# Patient Record
Sex: MALE | Race: WHITE | Employment: OTHER | URBAN - NONMETROPOLITAN AREA
[De-identification: names, ages, dates, MRNs, and addresses within clinical notes are randomized per-mention and may not be internally consistent; named-entity substitution may affect disease eponyms.]

---

## 2017-01-06 ENCOUNTER — CARE COORDINATION (OUTPATIENT)
Dept: CARE COORDINATION | Age: 72
End: 2017-01-06

## 2017-01-27 PROBLEM — I20.0 UNSTABLE ANGINA (HCC): Status: ACTIVE | Noted: 2017-01-27

## 2017-01-31 ENCOUNTER — OFFICE VISIT (OUTPATIENT)
Dept: PULMONOLOGY | Age: 72
End: 2017-01-31

## 2017-01-31 ENCOUNTER — CARE COORDINATION (OUTPATIENT)
Dept: CARE COORDINATION | Age: 72
End: 2017-01-31

## 2017-01-31 VITALS
WEIGHT: 266 LBS | DIASTOLIC BLOOD PRESSURE: 76 MMHG | SYSTOLIC BLOOD PRESSURE: 140 MMHG | BODY MASS INDEX: 36.03 KG/M2 | OXYGEN SATURATION: 98 % | RESPIRATION RATE: 18 BRPM | HEART RATE: 77 BPM | TEMPERATURE: 98.5 F | HEIGHT: 72 IN

## 2017-01-31 DIAGNOSIS — G47.33 OSA ON CPAP: ICD-10-CM

## 2017-01-31 DIAGNOSIS — J43.2 CENTRILOBULAR EMPHYSEMA (HCC): Primary | ICD-10-CM

## 2017-01-31 DIAGNOSIS — Z99.89 OSA ON CPAP: ICD-10-CM

## 2017-01-31 PROCEDURE — 99213 OFFICE O/P EST LOW 20 MIN: CPT | Performed by: PHYSICIAN ASSISTANT

## 2017-01-31 PROCEDURE — 1123F ACP DISCUSS/DSCN MKR DOCD: CPT | Performed by: PHYSICIAN ASSISTANT

## 2017-01-31 PROCEDURE — G8427 DOCREV CUR MEDS BY ELIG CLIN: HCPCS | Performed by: PHYSICIAN ASSISTANT

## 2017-01-31 PROCEDURE — 1111F DSCHRG MED/CURRENT MED MERGE: CPT | Performed by: PHYSICIAN ASSISTANT

## 2017-01-31 PROCEDURE — G8417 CALC BMI ABV UP PARAM F/U: HCPCS | Performed by: PHYSICIAN ASSISTANT

## 2017-01-31 PROCEDURE — 3023F SPIROM DOC REV: CPT | Performed by: PHYSICIAN ASSISTANT

## 2017-01-31 PROCEDURE — G8926 SPIRO NO PERF OR DOC: HCPCS | Performed by: PHYSICIAN ASSISTANT

## 2017-01-31 PROCEDURE — 4040F PNEUMOC VAC/ADMIN/RCVD: CPT | Performed by: PHYSICIAN ASSISTANT

## 2017-01-31 PROCEDURE — 1036F TOBACCO NON-USER: CPT | Performed by: PHYSICIAN ASSISTANT

## 2017-01-31 PROCEDURE — G8598 ASA/ANTIPLAT THER USED: HCPCS | Performed by: PHYSICIAN ASSISTANT

## 2017-01-31 PROCEDURE — 3017F COLORECTAL CA SCREEN DOC REV: CPT | Performed by: PHYSICIAN ASSISTANT

## 2017-01-31 PROCEDURE — G8484 FLU IMMUNIZE NO ADMIN: HCPCS | Performed by: PHYSICIAN ASSISTANT

## 2017-01-31 ASSESSMENT — ENCOUNTER SYMPTOMS
SPUTUM PRODUCTION: 0
SORE THROAT: 0
COUGH: 0
VOMITING: 0
NAUSEA: 0
GASTROINTESTINAL NEGATIVE: 1
HEMOPTYSIS: 0
SHORTNESS OF BREATH: 0
RESPIRATORY NEGATIVE: 1
BACK PAIN: 0
HEARTBURN: 0
EYES NEGATIVE: 1
WHEEZING: 0

## 2017-02-02 ENCOUNTER — OFFICE VISIT (OUTPATIENT)
Dept: FAMILY MEDICINE CLINIC | Age: 72
End: 2017-02-02

## 2017-02-02 VITALS
TEMPERATURE: 97.5 F | BODY MASS INDEX: 38.25 KG/M2 | HEART RATE: 75 BPM | WEIGHT: 267.2 LBS | DIASTOLIC BLOOD PRESSURE: 74 MMHG | SYSTOLIC BLOOD PRESSURE: 136 MMHG | OXYGEN SATURATION: 97 % | HEIGHT: 70 IN

## 2017-02-02 DIAGNOSIS — R07.2 PRECORDIAL PAIN: Primary | ICD-10-CM

## 2017-02-02 DIAGNOSIS — I25.110 CORONARY ARTERY DISEASE INVOLVING NATIVE CORONARY ARTERY OF NATIVE HEART WITH UNSTABLE ANGINA PECTORIS (HCC): ICD-10-CM

## 2017-02-02 DIAGNOSIS — K52.839 MICROSCOPIC COLITIS, UNSPECIFIED: ICD-10-CM

## 2017-02-02 PROCEDURE — 99495 TRANSJ CARE MGMT MOD F2F 14D: CPT | Performed by: FAMILY MEDICINE

## 2017-02-13 ENCOUNTER — OFFICE VISIT (OUTPATIENT)
Dept: INTERNAL MEDICINE | Age: 72
End: 2017-02-13

## 2017-02-13 VITALS
HEIGHT: 72 IN | SYSTOLIC BLOOD PRESSURE: 120 MMHG | DIASTOLIC BLOOD PRESSURE: 80 MMHG | BODY MASS INDEX: 37.22 KG/M2 | HEART RATE: 78 BPM | WEIGHT: 274.8 LBS

## 2017-02-13 DIAGNOSIS — E11.9 TYPE 2 DIABETES MELLITUS WITHOUT COMPLICATION, WITH LONG-TERM CURRENT USE OF INSULIN (HCC): ICD-10-CM

## 2017-02-13 DIAGNOSIS — E66.09 NON MORBID OBESITY DUE TO EXCESS CALORIES: ICD-10-CM

## 2017-02-13 DIAGNOSIS — K21.9 GASTROESOPHAGEAL REFLUX DISEASE, ESOPHAGITIS PRESENCE NOT SPECIFIED: ICD-10-CM

## 2017-02-13 DIAGNOSIS — Z99.89 OSA ON CPAP: ICD-10-CM

## 2017-02-13 DIAGNOSIS — Z79.4 TYPE 2 DIABETES MELLITUS WITHOUT COMPLICATION, WITH LONG-TERM CURRENT USE OF INSULIN (HCC): ICD-10-CM

## 2017-02-13 DIAGNOSIS — I20.8 ANGINA OF EFFORT (HCC): Primary | ICD-10-CM

## 2017-02-13 DIAGNOSIS — E78.5 HYPERLIPIDEMIA, UNSPECIFIED HYPERLIPIDEMIA TYPE: ICD-10-CM

## 2017-02-13 DIAGNOSIS — G47.33 OSA ON CPAP: ICD-10-CM

## 2017-02-13 DIAGNOSIS — I25.10 ASCVD (ARTERIOSCLEROTIC CARDIOVASCULAR DISEASE): ICD-10-CM

## 2017-02-13 PROCEDURE — 3017F COLORECTAL CA SCREEN DOC REV: CPT | Performed by: INTERNAL MEDICINE

## 2017-02-13 PROCEDURE — G8417 CALC BMI ABV UP PARAM F/U: HCPCS | Performed by: INTERNAL MEDICINE

## 2017-02-13 PROCEDURE — G8598 ASA/ANTIPLAT THER USED: HCPCS | Performed by: INTERNAL MEDICINE

## 2017-02-13 PROCEDURE — 1036F TOBACCO NON-USER: CPT | Performed by: INTERNAL MEDICINE

## 2017-02-13 PROCEDURE — 3045F PR MOST RECENT HEMOGLOBIN A1C LEVEL 7.0-9.0%: CPT | Performed by: INTERNAL MEDICINE

## 2017-02-13 PROCEDURE — 99214 OFFICE O/P EST MOD 30 MIN: CPT | Performed by: INTERNAL MEDICINE

## 2017-02-13 PROCEDURE — G8484 FLU IMMUNIZE NO ADMIN: HCPCS | Performed by: INTERNAL MEDICINE

## 2017-02-13 PROCEDURE — 4040F PNEUMOC VAC/ADMIN/RCVD: CPT | Performed by: INTERNAL MEDICINE

## 2017-02-13 PROCEDURE — G8427 DOCREV CUR MEDS BY ELIG CLIN: HCPCS | Performed by: INTERNAL MEDICINE

## 2017-02-13 PROCEDURE — 1111F DSCHRG MED/CURRENT MED MERGE: CPT | Performed by: INTERNAL MEDICINE

## 2017-02-13 PROCEDURE — 1123F ACP DISCUSS/DSCN MKR DOCD: CPT | Performed by: INTERNAL MEDICINE

## 2017-02-14 RX ORDER — ATORVASTATIN CALCIUM 80 MG/1
TABLET, FILM COATED ORAL
Qty: 90 TABLET | Refills: 2 | Status: SHIPPED | OUTPATIENT
Start: 2017-02-14 | End: 2017-12-12 | Stop reason: SDUPTHER

## 2017-02-14 RX ORDER — ISOSORBIDE MONONITRATE 120 MG/1
TABLET, EXTENDED RELEASE ORAL
Qty: 90 TABLET | Refills: 2 | Status: ON HOLD | OUTPATIENT
Start: 2017-02-14 | End: 2018-05-23 | Stop reason: ALTCHOICE

## 2017-02-15 ENCOUNTER — CARE COORDINATION (OUTPATIENT)
Dept: CARE COORDINATION | Age: 72
End: 2017-02-15

## 2017-02-20 ENCOUNTER — CARE COORDINATION (OUTPATIENT)
Dept: CARE COORDINATION | Age: 72
End: 2017-02-20

## 2017-03-01 ENCOUNTER — CARE COORDINATION (OUTPATIENT)
Dept: CARE COORDINATION | Age: 72
End: 2017-03-01

## 2017-03-09 ENCOUNTER — CARE COORDINATION (OUTPATIENT)
Dept: CARE COORDINATION | Age: 72
End: 2017-03-09

## 2017-03-17 ENCOUNTER — TELEPHONE (OUTPATIENT)
Dept: PHARMACY | Facility: CLINIC | Age: 72
End: 2017-03-17

## 2017-03-27 ENCOUNTER — CARE COORDINATION (OUTPATIENT)
Dept: CARE COORDINATION | Age: 72
End: 2017-03-27

## 2017-03-31 RX ORDER — METOPROLOL TARTRATE 75 MG/1
75 TABLET, FILM COATED ORAL 2 TIMES DAILY
Qty: 60 TABLET | Refills: 2 | Status: SHIPPED | OUTPATIENT
Start: 2017-03-31 | End: 2017-05-01 | Stop reason: SDUPTHER

## 2017-04-04 ENCOUNTER — OFFICE VISIT (OUTPATIENT)
Dept: UROLOGY | Age: 72
End: 2017-04-04

## 2017-04-04 VITALS
SYSTOLIC BLOOD PRESSURE: 102 MMHG | WEIGHT: 255 LBS | HEIGHT: 72 IN | DIASTOLIC BLOOD PRESSURE: 60 MMHG | BODY MASS INDEX: 34.54 KG/M2

## 2017-04-04 DIAGNOSIS — C61 PROSTATE CANCER (HCC): Primary | ICD-10-CM

## 2017-04-04 LAB
BILIRUBIN URINE: NEGATIVE
BLOOD URINE, POC: ABNORMAL
CHARACTER, URINE: CLEAR
COLOR, URINE: YELLOW
GLUCOSE URINE: NEGATIVE MG/DL
KETONES, URINE: NEGATIVE
LEUKOCYTE CLUMPS, URINE: NEGATIVE
NITRITE, URINE: NEGATIVE
PH, URINE: 5
PROTEIN, URINE: >= 300 MG/DL
SPECIFIC GRAVITY, URINE: 1.02 (ref 1–1.03)
UROBILINOGEN, URINE: 0.2 EU/DL

## 2017-04-04 PROCEDURE — 1123F ACP DISCUSS/DSCN MKR DOCD: CPT | Performed by: NURSE PRACTITIONER

## 2017-04-04 PROCEDURE — 99213 OFFICE O/P EST LOW 20 MIN: CPT | Performed by: NURSE PRACTITIONER

## 2017-04-04 PROCEDURE — 4040F PNEUMOC VAC/ADMIN/RCVD: CPT | Performed by: NURSE PRACTITIONER

## 2017-04-04 PROCEDURE — 81003 URINALYSIS AUTO W/O SCOPE: CPT | Performed by: NURSE PRACTITIONER

## 2017-04-04 PROCEDURE — G8417 CALC BMI ABV UP PARAM F/U: HCPCS | Performed by: NURSE PRACTITIONER

## 2017-04-04 PROCEDURE — 1036F TOBACCO NON-USER: CPT | Performed by: NURSE PRACTITIONER

## 2017-04-04 PROCEDURE — G8598 ASA/ANTIPLAT THER USED: HCPCS | Performed by: NURSE PRACTITIONER

## 2017-04-04 PROCEDURE — G8427 DOCREV CUR MEDS BY ELIG CLIN: HCPCS | Performed by: NURSE PRACTITIONER

## 2017-04-04 PROCEDURE — 3017F COLORECTAL CA SCREEN DOC REV: CPT | Performed by: NURSE PRACTITIONER

## 2017-04-10 ENCOUNTER — CARE COORDINATION (OUTPATIENT)
Dept: CARE COORDINATION | Age: 72
End: 2017-04-10

## 2017-04-17 RX ORDER — TIOTROPIUM BROMIDE 18 UG/1
CAPSULE ORAL; RESPIRATORY (INHALATION)
Qty: 90 CAPSULE | Refills: 3 | Status: ON HOLD | OUTPATIENT
Start: 2017-04-17 | End: 2018-01-24 | Stop reason: ALTCHOICE

## 2017-05-01 RX ORDER — METOPROLOL TARTRATE 75 MG/1
75 TABLET, FILM COATED ORAL 2 TIMES DAILY
Qty: 60 TABLET | Refills: 2 | Status: SHIPPED | OUTPATIENT
Start: 2017-05-01 | End: 2017-07-13 | Stop reason: SDUPTHER

## 2017-05-08 ENCOUNTER — TELEPHONE (OUTPATIENT)
Dept: FAMILY MEDICINE CLINIC | Age: 72
End: 2017-05-08

## 2017-05-10 ENCOUNTER — CARE COORDINATION (OUTPATIENT)
Dept: CARE COORDINATION | Age: 72
End: 2017-05-10

## 2017-05-24 ENCOUNTER — OFFICE VISIT (OUTPATIENT)
Dept: FAMILY MEDICINE CLINIC | Age: 72
End: 2017-05-24

## 2017-05-24 VITALS
HEART RATE: 66 BPM | WEIGHT: 262 LBS | DIASTOLIC BLOOD PRESSURE: 68 MMHG | RESPIRATION RATE: 12 BRPM | TEMPERATURE: 98.1 F | BODY MASS INDEX: 35.49 KG/M2 | HEIGHT: 72 IN | SYSTOLIC BLOOD PRESSURE: 124 MMHG

## 2017-05-24 DIAGNOSIS — E11.65 UNCONTROLLED TYPE 2 DIABETES MELLITUS WITH STAGE 3 CHRONIC KIDNEY DISEASE, UNSPECIFIED LONG TERM INSULIN USE STATUS: Primary | ICD-10-CM

## 2017-05-24 DIAGNOSIS — E11.22 UNCONTROLLED TYPE 2 DIABETES MELLITUS WITH STAGE 3 CHRONIC KIDNEY DISEASE, UNSPECIFIED LONG TERM INSULIN USE STATUS: Primary | ICD-10-CM

## 2017-05-24 DIAGNOSIS — I25.810 CORONARY ARTERY DISEASE INVOLVING AUTOLOGOUS VEIN CORONARY BYPASS GRAFT WITHOUT ANGINA PECTORIS: ICD-10-CM

## 2017-05-24 DIAGNOSIS — N18.3 UNCONTROLLED TYPE 2 DIABETES MELLITUS WITH STAGE 3 CHRONIC KIDNEY DISEASE, UNSPECIFIED LONG TERM INSULIN USE STATUS: Primary | ICD-10-CM

## 2017-05-24 DIAGNOSIS — I10 ESSENTIAL HYPERTENSION: ICD-10-CM

## 2017-05-24 PROCEDURE — G8417 CALC BMI ABV UP PARAM F/U: HCPCS | Performed by: FAMILY MEDICINE

## 2017-05-24 PROCEDURE — G8598 ASA/ANTIPLAT THER USED: HCPCS | Performed by: FAMILY MEDICINE

## 2017-05-24 PROCEDURE — 99214 OFFICE O/P EST MOD 30 MIN: CPT | Performed by: FAMILY MEDICINE

## 2017-05-24 PROCEDURE — G8427 DOCREV CUR MEDS BY ELIG CLIN: HCPCS | Performed by: FAMILY MEDICINE

## 2017-05-24 PROCEDURE — 1123F ACP DISCUSS/DSCN MKR DOCD: CPT | Performed by: FAMILY MEDICINE

## 2017-05-24 PROCEDURE — 3045F PR MOST RECENT HEMOGLOBIN A1C LEVEL 7.0-9.0%: CPT | Performed by: FAMILY MEDICINE

## 2017-05-24 PROCEDURE — 1036F TOBACCO NON-USER: CPT | Performed by: FAMILY MEDICINE

## 2017-05-24 PROCEDURE — 3017F COLORECTAL CA SCREEN DOC REV: CPT | Performed by: FAMILY MEDICINE

## 2017-05-24 PROCEDURE — 4040F PNEUMOC VAC/ADMIN/RCVD: CPT | Performed by: FAMILY MEDICINE

## 2017-05-24 RX ORDER — BUMETANIDE 2 MG/1
1 TABLET ORAL 2 TIMES DAILY
Status: ON HOLD | COMMUNITY
Start: 2017-05-01 | End: 2020-01-01 | Stop reason: HOSPADM

## 2017-05-24 RX ORDER — AMLODIPINE BESYLATE 10 MG/1
1 TABLET ORAL DAILY
COMMUNITY
Start: 2017-03-02 | End: 2018-01-10

## 2017-05-24 ASSESSMENT — PATIENT HEALTH QUESTIONNAIRE - PHQ9
1. LITTLE INTEREST OR PLEASURE IN DOING THINGS: 0
SUM OF ALL RESPONSES TO PHQ QUESTIONS 1-9: 0
2. FEELING DOWN, DEPRESSED OR HOPELESS: 0
SUM OF ALL RESPONSES TO PHQ9 QUESTIONS 1 & 2: 0

## 2017-06-15 ENCOUNTER — CARE COORDINATION (OUTPATIENT)
Dept: CARE COORDINATION | Age: 72
End: 2017-06-15

## 2017-06-15 ASSESSMENT — ENCOUNTER SYMPTOMS: DYSPNEA ASSOCIATED WITH: EXERTION

## 2017-06-20 RX ORDER — INSULIN GLARGINE 100 [IU]/ML
INJECTION, SOLUTION SUBCUTANEOUS
Qty: 1 VIAL | Refills: 3 | Status: SHIPPED | OUTPATIENT
Start: 2017-06-20 | End: 2017-06-20 | Stop reason: SDUPTHER

## 2017-06-20 RX ORDER — INSULIN GLARGINE 100 [IU]/ML
INJECTION, SOLUTION SUBCUTANEOUS
Qty: 1 VIAL | Refills: 3 | Status: SHIPPED | OUTPATIENT
Start: 2017-06-20 | End: 2017-06-27 | Stop reason: SDUPTHER

## 2017-06-28 RX ORDER — SERTRALINE HYDROCHLORIDE 100 MG/1
TABLET, FILM COATED ORAL
Qty: 90 TABLET | Refills: 0 | Status: SHIPPED | OUTPATIENT
Start: 2017-06-28 | End: 2017-09-30 | Stop reason: SDUPTHER

## 2017-07-11 ENCOUNTER — OFFICE VISIT (OUTPATIENT)
Dept: UROLOGY | Age: 72
End: 2017-07-11

## 2017-07-11 VITALS
WEIGHT: 274 LBS | HEIGHT: 72 IN | SYSTOLIC BLOOD PRESSURE: 132 MMHG | DIASTOLIC BLOOD PRESSURE: 76 MMHG | BODY MASS INDEX: 37.11 KG/M2

## 2017-07-11 DIAGNOSIS — C61 PROSTATE CANCER (HCC): Primary | ICD-10-CM

## 2017-07-11 LAB
BILIRUBIN URINE: NEGATIVE
BLOOD URINE, POC: ABNORMAL
CHARACTER, URINE: CLEAR
COLOR, URINE: YELLOW
GLUCOSE URINE: NEGATIVE MG/DL
KETONES, URINE: NEGATIVE
LEUKOCYTE CLUMPS, URINE: NEGATIVE
NITRITE, URINE: NEGATIVE
PH, URINE: 5
PROTEIN, URINE: 100 MG/DL
SPECIFIC GRAVITY, URINE: 1.01 (ref 1–1.03)
UROBILINOGEN, URINE: 0.2 EU/DL

## 2017-07-11 PROCEDURE — 99213 OFFICE O/P EST LOW 20 MIN: CPT | Performed by: NURSE PRACTITIONER

## 2017-07-11 PROCEDURE — 1036F TOBACCO NON-USER: CPT | Performed by: NURSE PRACTITIONER

## 2017-07-11 PROCEDURE — G8417 CALC BMI ABV UP PARAM F/U: HCPCS | Performed by: NURSE PRACTITIONER

## 2017-07-11 PROCEDURE — G8598 ASA/ANTIPLAT THER USED: HCPCS | Performed by: NURSE PRACTITIONER

## 2017-07-11 PROCEDURE — G8427 DOCREV CUR MEDS BY ELIG CLIN: HCPCS | Performed by: NURSE PRACTITIONER

## 2017-07-11 PROCEDURE — 4040F PNEUMOC VAC/ADMIN/RCVD: CPT | Performed by: NURSE PRACTITIONER

## 2017-07-11 PROCEDURE — 3017F COLORECTAL CA SCREEN DOC REV: CPT | Performed by: NURSE PRACTITIONER

## 2017-07-11 PROCEDURE — 1123F ACP DISCUSS/DSCN MKR DOCD: CPT | Performed by: NURSE PRACTITIONER

## 2017-07-11 PROCEDURE — 81003 URINALYSIS AUTO W/O SCOPE: CPT | Performed by: NURSE PRACTITIONER

## 2017-07-13 ENCOUNTER — CARE COORDINATION (OUTPATIENT)
Dept: CARE COORDINATION | Age: 72
End: 2017-07-13

## 2017-07-13 ASSESSMENT — ENCOUNTER SYMPTOMS: DYSPNEA ASSOCIATED WITH: EXERTION

## 2017-07-14 RX ORDER — METOPROLOL TARTRATE 75 MG/1
75 TABLET, FILM COATED ORAL 2 TIMES DAILY
Qty: 180 TABLET | Refills: 1 | Status: SHIPPED | OUTPATIENT
Start: 2017-07-14 | End: 2017-12-20 | Stop reason: SDUPTHER

## 2017-07-27 ENCOUNTER — CARE COORDINATION (OUTPATIENT)
Dept: CARE COORDINATION | Age: 72
End: 2017-07-27

## 2017-08-23 ENCOUNTER — CARE COORDINATION (OUTPATIENT)
Dept: CARE COORDINATION | Age: 72
End: 2017-08-23

## 2017-08-23 ASSESSMENT — ENCOUNTER SYMPTOMS: DYSPNEA ASSOCIATED WITH: EXERTION

## 2017-09-20 ENCOUNTER — NURSE ONLY (OUTPATIENT)
Dept: FAMILY MEDICINE CLINIC | Age: 72
End: 2017-09-20
Payer: MEDICARE

## 2017-09-20 DIAGNOSIS — Z23 NEED FOR PROPHYLACTIC VACCINATION AND INOCULATION AGAINST INFLUENZA: Primary | ICD-10-CM

## 2017-09-20 PROCEDURE — G0008 ADMIN INFLUENZA VIRUS VAC: HCPCS | Performed by: FAMILY MEDICINE

## 2017-09-20 PROCEDURE — 90662 IIV NO PRSV INCREASED AG IM: CPT | Performed by: FAMILY MEDICINE

## 2017-09-26 ENCOUNTER — CARE COORDINATION (OUTPATIENT)
Dept: CARE COORDINATION | Age: 72
End: 2017-09-26

## 2017-09-28 ASSESSMENT — ENCOUNTER SYMPTOMS: DYSPNEA ASSOCIATED WITH: EXERTION

## 2017-10-02 RX ORDER — SERTRALINE HYDROCHLORIDE 100 MG/1
TABLET, FILM COATED ORAL
Qty: 90 TABLET | Refills: 0 | Status: SHIPPED | OUTPATIENT
Start: 2017-10-02 | End: 2018-01-17 | Stop reason: SDUPTHER

## 2017-10-13 RX ORDER — LEVOTHYROXINE SODIUM 0.05 MG/1
TABLET ORAL
Qty: 90 TABLET | Refills: 1 | Status: SHIPPED | OUTPATIENT
Start: 2017-10-13 | End: 2018-03-28 | Stop reason: SDUPTHER

## 2017-10-13 NOTE — TELEPHONE ENCOUNTER
Last visit- 05/24/17  Next visit- 11/14/2017    Requested Prescriptions     Pending Prescriptions Disp Refills    levothyroxine (SYNTHROID) 50 MCG tablet 90 tablet 2     Sig: TAKE 1 TABLET MONDAY THROUGH SATURDAY, THEN TAKE ONE AND ONE-HALF TABLETS ON SUNDAY

## 2017-10-16 ENCOUNTER — OFFICE VISIT (OUTPATIENT)
Dept: FAMILY MEDICINE CLINIC | Age: 72
End: 2017-10-16
Payer: MEDICARE

## 2017-10-16 VITALS
DIASTOLIC BLOOD PRESSURE: 70 MMHG | WEIGHT: 268 LBS | SYSTOLIC BLOOD PRESSURE: 120 MMHG | BODY MASS INDEX: 36.3 KG/M2 | TEMPERATURE: 97.7 F | HEIGHT: 72 IN | HEART RATE: 82 BPM | OXYGEN SATURATION: 98 %

## 2017-10-16 DIAGNOSIS — J44.1 ACUTE EXACERBATION OF CHRONIC OBSTRUCTIVE AIRWAYS DISEASE (HCC): Primary | ICD-10-CM

## 2017-10-16 PROCEDURE — G8417 CALC BMI ABV UP PARAM F/U: HCPCS | Performed by: FAMILY MEDICINE

## 2017-10-16 PROCEDURE — G8427 DOCREV CUR MEDS BY ELIG CLIN: HCPCS | Performed by: FAMILY MEDICINE

## 2017-10-16 PROCEDURE — 99213 OFFICE O/P EST LOW 20 MIN: CPT | Performed by: FAMILY MEDICINE

## 2017-10-16 PROCEDURE — 1123F ACP DISCUSS/DSCN MKR DOCD: CPT | Performed by: FAMILY MEDICINE

## 2017-10-16 PROCEDURE — G8926 SPIRO NO PERF OR DOC: HCPCS | Performed by: FAMILY MEDICINE

## 2017-10-16 PROCEDURE — G8598 ASA/ANTIPLAT THER USED: HCPCS | Performed by: FAMILY MEDICINE

## 2017-10-16 PROCEDURE — 1036F TOBACCO NON-USER: CPT | Performed by: FAMILY MEDICINE

## 2017-10-16 PROCEDURE — 3023F SPIROM DOC REV: CPT | Performed by: FAMILY MEDICINE

## 2017-10-16 PROCEDURE — G8484 FLU IMMUNIZE NO ADMIN: HCPCS | Performed by: FAMILY MEDICINE

## 2017-10-16 PROCEDURE — 3017F COLORECTAL CA SCREEN DOC REV: CPT | Performed by: FAMILY MEDICINE

## 2017-10-16 PROCEDURE — 4040F PNEUMOC VAC/ADMIN/RCVD: CPT | Performed by: FAMILY MEDICINE

## 2017-10-16 RX ORDER — GUAIFENESIN 600 MG/1
600 TABLET, EXTENDED RELEASE ORAL 2 TIMES DAILY
Qty: 40 TABLET | Refills: 0 | Status: SHIPPED | OUTPATIENT
Start: 2017-10-16 | End: 2017-10-16 | Stop reason: SDUPTHER

## 2017-10-16 RX ORDER — GUAIFENESIN 600 MG/1
600 TABLET, EXTENDED RELEASE ORAL 2 TIMES DAILY
Qty: 40 TABLET | Refills: 0 | Status: ON HOLD | OUTPATIENT
Start: 2017-10-16 | End: 2018-01-24 | Stop reason: ALTCHOICE

## 2017-10-16 RX ORDER — AMOXICILLIN AND CLAVULANATE POTASSIUM 875; 125 MG/1; MG/1
1 TABLET, FILM COATED ORAL 2 TIMES DAILY
Qty: 20 TABLET | Refills: 0 | Status: SHIPPED | OUTPATIENT
Start: 2017-10-16 | End: 2018-05-29 | Stop reason: SDUPTHER

## 2017-10-16 RX ORDER — AMOXICILLIN AND CLAVULANATE POTASSIUM 875; 125 MG/1; MG/1
1 TABLET, FILM COATED ORAL 2 TIMES DAILY
Qty: 20 TABLET | Refills: 0 | Status: SHIPPED | OUTPATIENT
Start: 2017-10-16 | End: 2017-10-16 | Stop reason: SDUPTHER

## 2017-10-16 RX ORDER — FLUTICASONE PROPIONATE 50 MCG
1 SPRAY, SUSPENSION (ML) NASAL DAILY
Qty: 1 BOTTLE | Refills: 3 | Status: SHIPPED | OUTPATIENT
Start: 2017-10-16 | End: 2017-10-16 | Stop reason: SDUPTHER

## 2017-10-16 RX ORDER — FLUTICASONE PROPIONATE 50 MCG
1 SPRAY, SUSPENSION (ML) NASAL DAILY
Qty: 1 BOTTLE | Refills: 3 | Status: SHIPPED | OUTPATIENT
Start: 2017-10-16 | End: 2019-01-18 | Stop reason: SDUPTHER

## 2017-10-16 NOTE — PROGRESS NOTES
Progress Note    Patient Name:  Thien Morris   : [id number]   Age: 67 y.o. Date of Exam:  2017       Reason For Visit:   Chief Complaint   Patient presents with    Cough     4 days    Chest Congestion     4 days    Sinus Problem     4 days        Trent Bliss is a 67 y.o. male who comes today to the office because of sore throat, congestion, malaise and cough. Symptoms started 5 day(s) ago. The symptoms have gotten worse. He has not tried medications. He has not been exposed to somebody with similar symptoms. His symptoms have progressed to productive cough, shortness of breath with moderate physical activity. The sputum is thick gray.   He has COPD and has been using his albuterol, but it is not helping as much    Significant Past Medical History:    Past Medical History:   Diagnosis Date    Angina pectoris ([address]     Blood circulation, collateral     CAD (coronary artery disease)     Status post bypass, Status post stents    Cancer ([address] 5319,7441    Melanoma x2     Cancer ([address] 2016    Prostate     Carotid artery disease ([address]     moderate    Cerebral artery occlusion with cerebral infarction ([address]     CHF (congestive heart failure) ([address]     CKD (chronic kidney disease), stage III 2012    has lost 13% more of kidney function     COPD (chronic obstructive pulmonary disease) ([address]     Detached retina     Diabetes mellitus ([address]     Diabetic nephropathy/sclerosis ([address]     Diverticula of colon 2006    GERD (gastroesophageal reflux disease)     Hearing loss     does not wear aides    History of blood transfusion     during open heart ? and During service    Hyperlipidemia     Hypertension     Hypothyroidism 2012    Peripheral vascular disease ([address] 1999    Pneumonia     Prostate cancer ([address] 2016    Retinopathy due to secondary diabetes ([address]     Tobacco abuse     Type II or unspecified type diabetes mellitus without mention of complication, not stated as uncontrolled [place] blurred     rt eye    Vitreous hemorrhage of right eye ([address]     Wears glasses            Allergies:  is allergic to tape [adhesive tape]. Medications:   Current Outpatient Prescriptions   Medication Sig Dispense Refill    amoxicillin-clavulanate (AUGMENTIN) 875-125 MG per tablet Take 1 tablet by mouth 2 times daily for 10 days 20 tablet 0    fluticasone (FLONASE) 50 MCG/ACT nasal spray 1 spray by Nasal route daily 1 Bottle 3    guaiFENesin (MUCINEX) 600 MG extended release tablet Take 1 tablet by mouth 2 times daily 40 tablet 0    levothyroxine (SYNTHROID) 50 MCG tablet TAKE 1 TABLET MONDAY THROUGH SATURDAY, THEN TAKE ONE AND ONE-HALF TABLETS ON SUNDAY 90 tablet 1    sertraline (ZOLOFT) 100 MG tablet TAKE 1 TABLET DAILY 90 tablet 0    Metoprolol Tartrate 75 MG TABS Take 75 mg by mouth 2 times daily 180 tablet 1    Liraglutide (VICTOZA) 18 MG/3ML SOPN SC injection INJECT 1.8 MG INTO THE SKIN DAILY 9 Pen 2    insulin glargine (LANTUS SOLOSTAR) 100 UNIT/ML injection pen 34 units in am, 32 units in pm 5 Pen 3    insulin lispro (HUMALOG KWIKPEN) 100 UNIT/ML pen Per sliding scale TID, AC (max 80 units daily) 24 Pen 1    bumetanide (BUMEX) 2 MG tablet Take 1 tablet by mouth daily      amLODIPine (NORVASC) 10 MG tablet Take 1 tablet by mouth daily      SPIRIVA HANDIHALER 18 MCG inhalation capsule INHALE THE CONTENTS OF 1 CAPSULE DAILY 90 capsule 3    atorvastatin (LIPITOR) 80 MG tablet TAKE 1 TABLET DAILY 90 tablet 2    isosorbide mononitrate (IMDUR) 120 MG extended release tablet TAKE 1 TABLET EVERY MORNING 90 tablet 2    pantoprazole (PROTONIX) 40 MG tablet Take 1 tablet by mouth 2 times daily (before meals) 30 tablet 3    EFFIENT 10 MG TABS Take 1 tablet by mouth daily      losartan (COZAAR) 50 MG tablet Take 1 tablet by mouth daily 90 tablet 1    metroNIDAZOLE (METROGEL) 0.75 % gel Apply topically 2 times daily.  1 Tube 2    nitroGLYCERIN (NITROSTAT) 0.4 MG SL tablet Take 1 tablet for chest pain and repeat after 5 min if no relief, call 911 and take another dose 5 min later. Max of 3 doses in 15 min. 25 tablet 3    hydrALAZINE (APRESOLINE) 50 MG tablet Take 1 tablet by mouth 3 times daily as needed (Systolic blood pressure greater than 160) 90 tablet 3    CPAP Machine MISC by Does not apply route Please change CPAP pressure to 14 cm H20. 1 each 0    albuterol sulfate HFA (PROVENTIL HFA) 108 (90 BASE) MCG/ACT inhaler Inhale 2 puffs into the lungs every 6 hours as needed for Wheezing 1 Inhaler 3    glucose blood VI test strips (ACCU-CHEK TERESA PLUS) strip Check BS 4 times a day 400 each 1    aspirin 81 MG chewable tablet Take 1 tablet by mouth daily 30 tablet 3    B-D ULTRAFINE III SHORT PEN 31G X 8 MM MISC USE TO INJECT INSULIN UNDER THE SKIN FOUR TIMES A  each 1    Acetaminophen 650 MG TABS Take 650 mg by mouth every 4 hours as needed 120 tablet 3    Cholecalciferol (VITAMIN D3) 2000 UNITS CAPS   Take 1 capsule by mouth daily       glucagon 1 MG injection Inject 1 mg into the skin See Admin Instructions. Follow package directions for low blood sugar. 1 kit 3    Multiple Vitamin (MULTI-VITAMIN) TABS   Take 1 tablet by mouth daily        No current facility-administered medications for this visit. Review of systems:  Review of Systems - History obtained from the patient  General ROS: negative for - chills, fatigue or fever  ENT ROS: negative for - headaches.   Positive for nasal congestion  Respiratory ROS: positive for - cough,  shortness of breath and wheezing  Cardiovascular ROS: negative for - chest pain or edema  Gastrointestinal ROS: negative for - abdominal pain, constipation, diarrhea or nausea/vomiting  Musculoskeletal ROS: negative for - joint pain or muscle pain  Neurological ROS: negative for - dizziness  Dermatological ROS: negative for - rash    Physical Examination:  Blood pressure 120/70, pulse 82, temperature 97.7 °F (36.5 °C),

## 2017-10-16 NOTE — PROGRESS NOTES
Subjective:      Patient ID: Rosa Carlisle is a 67 y.o. male.     HPI    Review of Systems    Objective:   Physical Exam    Assessment:      ***      Plan:      ***

## 2017-10-16 NOTE — PATIENT INSTRUCTIONS
You may receive a survey about your visit with us today. The feedback from our patients helps us identify what is working well and where the service to all patients can be enhanced. Thank you! Patient Education        Saline Nasal Washes: Care Instructions  Your Care Instructions  Saline nasal washes help keep the nasal passages open by washing out thick or dried mucus. This simple remedy can help relieve symptoms of allergies, sinusitis, and colds. It also can make the nose feel more comfortable by keeping the mucous membranes moist. You may notice a little burning sensation in your nose the first few times you use the solution, but this usually gets better in a few days. Follow-up care is a key part of your treatment and safety. Be sure to make and go to all appointments, and call your doctor if you are having problems. It's also a good idea to know your test results and keep a list of the medicines you take. How can you care for yourself at home? · You can buy premixed saline solution in a squeeze bottle or other sinus rinse products at a drugstore. Read and follow the instructions on the label. · You also can make your own saline solution by adding 1 teaspoon of salt and 1 teaspoon of baking soda to 2 cups of distilled water. · If you use a homemade solution, pour a small amount into a clean bowl. Using a rubber bulb syringe, squeeze the syringe and place the tip in the salt water. Pull a small amount of the salt water into the syringe by relaxing your hand. · Sit down with your head tilted slightly back. Do not lie down. Put the tip of the bulb syringe or the squeeze bottle a little way into one of your nostrils. Gently drip or squirt a few drops into the nostril. Repeat with the other nostril. Some sneezing and gagging are normal at first.  · Gently blow your nose. · Wipe the syringe or bottle tip clean after each use. · Repeat this 2 or 3 times a day.   · Use nasal washes gently if you have nosebleeds often. When should you call for help? Watch closely for changes in your health, and be sure to contact your doctor if:  · You often get nosebleeds. · You have problems doing the nasal washes. Where can you learn more? Go to https://[email or web address].[email or web address] org and sign in to your [employer] account. Enter [phone] in the [address] box to learn more about \"Saline Nasal Washes: Care Instructions. \"     If you do not have an account, please click on the \"Sign Up Now\" link. Current as of: May 4, 2017  Content Version: 11.3  © 5063-3036 [employer]. Care instructions adapted under license by [facility] ([facility]). If you have questions about a medical condition or this instruction, always ask your healthcare professional. [address] any warranty or liability for your use of this information.

## 2017-10-20 DIAGNOSIS — E11.65 TYPE 2 DIABETES MELLITUS WITH HYPERGLYCEMIA, UNSPECIFIED LONG TERM INSULIN USE STATUS: Primary | ICD-10-CM

## 2017-10-24 ENCOUNTER — CARE COORDINATION (OUTPATIENT)
Dept: CARE COORDINATION | Age: 72
End: 2017-10-24

## 2017-10-24 ASSESSMENT — ENCOUNTER SYMPTOMS: DYSPNEA ASSOCIATED WITH: EXERTION

## 2017-10-24 NOTE — CARE COORDINATION
Instructions. Follow package directions for low blood sugar.  10/11/12  Yes Kendall Davis MD   Multiple Vitamin (MULTI-VITAMIN) TABS   Take 1 tablet by mouth daily    Yes Historical Provider, MD       Future Appointments  Date Time Provider Dean Turneri   11/21/2017 11:30 AM Omar Malagon MD [facility]   11/28/2017 10:40 AM Jonah Real MD [facility] [place] [facility]   1/10/2018 9:15 AM Waleska Salas, [address] [facility]

## 2017-11-08 ENCOUNTER — CARE COORDINATION (OUTPATIENT)
Dept: CARE COORDINATION | Age: 72
End: 2017-11-08

## 2017-11-15 ENCOUNTER — TELEPHONE (OUTPATIENT)
Dept: PHARMACY | Facility: CLINIC | Age: 72
End: 2017-11-15

## 2017-11-15 NOTE — TELEPHONE ENCOUNTER
CLINICAL PHARMACY NOTE - Medication Review  Misty Morales is a 67 y.o. male referred to a clinical pharmacy specialist given their history of COPD and/or HF and number of home medications. Patient outreach to review medications - Spoke with patient. States he would like to decline PharmD med review, has no questions/concerns at this time.     Josseline Spears, PharmD, [facility] Pharmacist  Direct: [phone]  Dept: [phone] (toll free [phone], option 7)     CLINICAL PHARMACY CONSULT: MED RECONCILIATION/REVIEW ADDENDUM    For Pharmacy Admin Tracking Only    PHSO: Yes  Time Spent (min): 5

## 2017-11-21 ENCOUNTER — OFFICE VISIT (OUTPATIENT)
Dept: FAMILY MEDICINE CLINIC | Age: 72
End: 2017-11-21
Payer: MEDICARE

## 2017-11-21 VITALS
TEMPERATURE: 98.4 F | DIASTOLIC BLOOD PRESSURE: 64 MMHG | HEART RATE: 64 BPM | WEIGHT: 267 LBS | BODY MASS INDEX: 37.38 KG/M2 | HEIGHT: 71 IN | SYSTOLIC BLOOD PRESSURE: 136 MMHG | OXYGEN SATURATION: 96 %

## 2017-11-21 DIAGNOSIS — E11.65 TYPE 2 DIABETES MELLITUS WITH HYPERGLYCEMIA, UNSPECIFIED LONG TERM INSULIN USE STATUS: ICD-10-CM

## 2017-11-21 DIAGNOSIS — Z00.00 ROUTINE GENERAL MEDICAL EXAMINATION AT A HEALTH CARE FACILITY: Primary | ICD-10-CM

## 2017-11-21 PROCEDURE — 3045F PR MOST RECENT HEMOGLOBIN A1C LEVEL 7.0-9.0%: CPT | Performed by: FAMILY MEDICINE

## 2017-11-21 PROCEDURE — G0444 DEPRESSION SCREEN ANNUAL: HCPCS | Performed by: FAMILY MEDICINE

## 2017-11-21 PROCEDURE — G0439 PPPS, SUBSEQ VISIT: HCPCS | Performed by: FAMILY MEDICINE

## 2017-11-21 PROCEDURE — G8598 ASA/ANTIPLAT THER USED: HCPCS | Performed by: FAMILY MEDICINE

## 2017-11-21 RX ORDER — FLUTICASONE PROPIONATE 50 MCG
1 SPRAY, SUSPENSION (ML) NASAL DAILY
Qty: 1 BOTTLE | Refills: 3 | Status: CANCELLED | OUTPATIENT
Start: 2017-11-21

## 2017-11-21 RX ORDER — SERTRALINE HYDROCHLORIDE 100 MG/1
TABLET, FILM COATED ORAL
Qty: 90 TABLET | Refills: 0 | Status: CANCELLED | OUTPATIENT
Start: 2017-11-21

## 2017-11-21 RX ORDER — METRONIDAZOLE 7.5 MG/G
GEL TOPICAL
Qty: 1 TUBE | Refills: 2 | Status: CANCELLED | OUTPATIENT
Start: 2017-11-21

## 2017-11-21 RX ORDER — LEVOTHYROXINE SODIUM 0.05 MG/1
TABLET ORAL
Qty: 90 TABLET | Refills: 1 | Status: CANCELLED | OUTPATIENT
Start: 2017-11-21

## 2017-11-21 ASSESSMENT — LIFESTYLE VARIABLES
HOW OFTEN DURING THE LAST YEAR HAVE YOU BEEN UNABLE TO REMEMBER WHAT HAPPENED THE NIGHT BEFORE BECAUSE YOU HAD BEEN DRINKING: 0
HAS A RELATIVE, FRIEND, DOCTOR, OR ANOTHER HEALTH PROFESSIONAL EXPRESSED CONCERN ABOUT YOUR DRINKING OR SUGGESTED YOU CUT DOWN: 0
HOW OFTEN DURING THE LAST YEAR HAVE YOU FOUND THAT YOU WERE NOT ABLE TO STOP DRINKING ONCE YOU HAD STARTED: 0
HAVE YOU OR SOMEONE ELSE BEEN INJURED AS A RESULT OF YOUR DRINKING: 0
HOW MANY STANDARD DRINKS CONTAINING ALCOHOL DO YOU HAVE ON A TYPICAL DAY: 3
HOW OFTEN DURING THE LAST YEAR HAVE YOU FAILED TO DO WHAT WAS NORMALLY EXPECTED FROM YOU BECAUSE OF DRINKING: 0
HOW OFTEN DURING THE LAST YEAR HAVE YOU NEEDED AN ALCOHOLIC DRINK FIRST THING IN THE MORNING TO GET YOURSELF GOING AFTER A NIGHT OF HEAVY DRINKING: 0
HOW OFTEN DURING THE LAST YEAR HAVE YOU HAD A FEELING OF GUILT OR REMORSE AFTER DRINKING: 1
AUDIT TOTAL SCORE: 7
AUDIT-C TOTAL SCORE: 6
HOW OFTEN DO YOU HAVE SIX OR MORE DRINKS ON ONE OCCASION: 1
HOW OFTEN DO YOU HAVE A DRINK CONTAINING ALCOHOL: 2

## 2017-11-21 ASSESSMENT — ANXIETY QUESTIONNAIRES: GAD7 TOTAL SCORE: 3

## 2017-11-21 NOTE — PATIENT INSTRUCTIONS
soft.  6. Use a nail file to gently smooth the edges of the nails, especially at the corners. They may be sharp after the nails are cut straight. 7. Apply nail polish, if the person wants it. If the person's nails are thick and discolored, it may be safest to have a podiatrist cut them. What else do you need to know? When you're caring for someone's nails, it is important to remember not to trim or cut the cuticles. A minor cut in a cuticle could lead to an infection. Wash the feet daily in the shower or bath or in a basin made for washing feet. It's extra important to wash the feet carefully if the person has diabetes. After washing the feet, dry gently. Put lotion on the feet, especially on the heels. But don't put it between the toes. If the person doesn't have diabetes and you see signs of athlete's foot (such as dry, cracking, or itchy skin between the toes), you can try an over-the-counter medicine. These medicines can kill the fungus that causes athlete's foot. If the problem doesn't go away, talk to the person's doctor. Look every day for cuts or signs of infection, such as pain, swelling, redness, or warmth. If you see any of these signsespecially in someone who has diabetescall the doctor. Where can you learn more? Go to https://[employer]ub[employer]. org and sign in to your [employer] account. Enter A759 in the [facility] box to learn more about \"Learning About Foot and Toenail Care. \"     If you do not have an account, please click on the \"Sign Up Now\" link. Current as of: March 17, 2017  Content Version: 11.3  © 5866-9614 [employer]. Care instructions adapted under license by [facility] [employer] [facility] ([facility]). If you have questions about a medical condition or this instruction, always ask your healthcare professional. [address]  any warranty or liability for your use of this information.            Personalized Preventive Plan for Fiordaliza Lalo Vazquez 11/21/2017  Medicare offers a range of preventive health benefits. Some of the tests and screenings are paid in full while other may be subject to a deductible, co-insurance, and/or copay. Some of these benefits include a comprehensive review of your medical history including lifestyle, illnesses that may run in your family, and various assessments and screenings as appropriate. After reviewing your medical record and screening and assessments performed today your provider may have ordered immunizations, labs, imaging, and/or referrals for you. A list of these orders (if applicable) as well as your Preventive Care list are included within your After Visit Summary for your review. Other Preventive Recommendations:    · A preventive eye exam performed by an eye specialist is recommended every 1-2 years to screen for glaucoma; cataracts, macular degeneration, and other eye disorders. · A preventive dental visit is recommended every 6 months. · Try to get at least 150 minutes of exercise per week or 10,000 steps per day on a pedometer . · Order or download the FREE \"Exercise & Physical Activity: Your Everyday Guide\" from [employer]. Call 1-820.473.9397 or search [employer] online. · You need 9268-1319 mg of calcium and 5225-2435 IU of vitamin D per day. It is possible to meet your calcium requirement with diet alone, but a vitamin D supplement is usually necessary to meet this goal.  · When exposed to the sun, use a sunscreen that protects against both UVA and UVB radiation with an SPF of 30 or greater. Reapply every 2 to 3 hours or after sweating, drying off with a towel, or swimming. · Always wear a seat belt when traveling in a car. Always wear a helmet when riding a bicycle or motorcycle. Heart-Healthy Diet   Sodium, Fat, and Cholesterol Controlled Diet       What Is a Heart Healthy Diet?    A heart-healthy diet is one that limits sodium , certain types of fat , and cholesterol . This type of diet is recommended for:   People with any form of cardiovascular disease (eg, coronary heart disease , peripheral vascular disease , previous heart attack , previous stroke )   People with risk factors for cardiovascular disease, such as high blood pressure , high cholesterol , or diabetes   Anyone who wants to lower their risk of developing cardiovascular disease   Sodium    Sodium is a mineral found in many foods. In general, most people consume much more sodium than they need. Diets high in sodium can increase blood pressure and lead to edema (water retention). On a heart-healthy diet, you should consume no more than 2,300 mg (milligrams) of sodium per dayabout the amount in one teaspoon of table salt. The foods highest in sodium include table salt (about 50% sodium), processed foods, convenience foods, and preserved foods. Cholesterol    Cholesterol is a fat-like, waxy substance in your blood. Our bodies make some cholesterol. It is also found in animal products, with the highest amounts in fatty meat, egg yolks, whole milk, cheese, shellfish, and organ meats. On a heart-healthy diet, you should limit your cholesterol intake to less than 200 mg per day. It is normal and important to have some cholesterol in your bloodstream. But too much cholesterol can cause plaque to build up within your arteries, which can eventually lead to a heart attack or stroke. The two types of cholesterol that are most commonly referred to are:   Low-density lipoprotein (LDL) cholesterol  Also known as bad cholesterol, this is the cholesterol that tends to build up along your arteries. Bad cholesterol levels are increased by eating fats that are saturated or hydrogenated. Optimal level of this cholesterol is less than 100. Over 130 starts to get risky for heart disease.    High-density lipoprotein (HDL) cholesterol  Also known as good cholesterol, this type of cholesterol actually carries cholesterol away from your arteries and may, therefore, help lower your risk of having a heart attack. You want this level to be high (ideally greater than 60). It is a risk to have a level less than 40. You can raise this good cholesterol by eating olive oil, canola oil, avocados, or nuts. Exercise raises this level, too. Fat    Fat is calorie dense and packs a lot of calories into a small amount of food. Even though fats should be limited due to their high calorie content, not all fats are bad. In fact, some fats are quite healthful. Fat can be broken down into four main types. The good-for-you fats are:   Monounsaturated fat  found in oils such as olive and canola, avocados, and nuts and natural nut butters; can decrease cholesterol levels, while keeping levels of HDL cholesterol high   Polyunsaturated fat  found in oils such as safflower, sunflower, soybean, corn, and sesame; can decrease total cholesterol and LDL cholesterol   Omega-3 fatty acids  particularly those found in fatty fish (such as salmon, trout, tuna, mackerel, herring, and sardines); can decrease risk of arrhythmias, decrease triglyceride levels, and slightly lower blood pressure   The fats that you want to limit are:   Saturated fat  found in animal products, many fast foods, and a few vegetables; increases total blood cholesterol, including LDL levels   Animal fats that are saturated include: butter, lard, whole-milk dairy products, meat fat, and poultry skin   Vegetable fats that are saturated include: hydrogenated shortening, palm oil, coconut oil, cocoa butter   Hydrogenated or trans fat  found in margarine and vegetable shortening, most shelf stable snack foods, and fried foods; increases LDL and decreases HDL     It is generally recommended that you limit your total fat for the day to less than 30% of your total calories. If you follow an 1800-calorie heart healthy diet, for example, this would mean 60 grams of fat or less per day. low-sodium peanut butter Dried peas, beans, and lentils   Any smoked, cured, salted, or canned meat, fish, or poultry (including fried, chipped beef, cold cuts, hot dogs, sausages, sardines, and anchovies) Poultry skins Breaded and/or fried fish or meats Canned peas, beans, and lentils Salted nuts   Fats and Oils   Olive oil and canola oil Low-sodium, low-fat salad dressings and mayonnaise   Butter, margarine, coconut and palm oils, fried fat   Snacks, Sweets, and Condiments   Low-sodium or unsalted versions of broths, soups, soy sauce, and condiments Pepper, herbs, and spices; vinegar, lemon, or lime juice Low-fat frozen desserts (yogurt, sherbet, fruit bars) Sugar, cocoa powder, honey, syrup, jam, and preserves Low-fat, trans-fat free cookies, cakes, and pies Peng and animal crackers, fig bars, shanell snaps   High-fat desserts Broth, soups, gravies, and sauces, made from instant mixes or other high-sodium ingredients Salted snack foods Canned olives Meat tenderizers, seasoning salt, and most flavored vinegars   Beverages   Low-sodium carbonated beverages Tea and coffee in moderation Soy milk   Commercially softened water   Suggestions   Make whole grains, fruits, and vegetables the base of your diet. Choose heart-healthy fats such as canola, olive, and flaxseed oil, and foods high in heart-healthy fats, such as nuts, seeds, soybeans, tofu, and fish. Eat fish at least twice per week; the fish highest in omega-3 fatty acids and lowest in mercury include salmon, herring, mackerel, sardines, and canned chunk light tuna. If you eat fish less than twice per week or have high triglycerides, talk to your doctor about taking fish oil supplements. Read food labels. For products low in fat and cholesterol, look for fat free, low-fat, cholesterol free, saturated fat free, and trans fat freeAlso scan the Nutrition Facts Label, which lists saturated fat, trans fat, and cholesterol amounts.    For products low in sodium, look for sodium free, very low sodium, low sodium, no added salt, and unsalted   Skip the salt when cooking or at the table; if food needs more flavor, get creative and try out different herbs and spices. Garlic and onion also add substantial flavor to foods. Trim any visible fat off meat and poultry before cooking, and drain the fat off after black. Use cooking methods that require little or no added fat, such as grilling, boiling, baking, poaching, broiling, roasting, steaming, stir-frying, and sauting. Avoid fast food and convenience food. They tend to be high in saturated and trans fat and have a lot of added salt. Talk to a registered dietitian for individualized diet advice. Last Reviewed: March 2011 Destiny Miranda MS, MPH, RD   Updated: 3/29/2011   ·     High-Fiber Diet     What Is Fiber? Dietary fiber is a form of carbohydrate found in plants that cannot be digested by humans. All plants contain fiber, including fruits, vegetables, grains, and legumes. Fiber is often classified into two categories: soluble and insoluble. Soluble fiber draws water into the bowel and can help slow digestion. Examples of foods that are high in soluble fiber include oatmeal, oat bran, barley, legumes (eg, beans and peas), apples, and strawberries. Insoluble fiber speeds digestion and can add bulk to the stool. Examples of foods that are high in insoluble fiber include whole-wheat products, wheat bran, cauliflower, green beans, and potatoes. Why Follow a High-Fiber Diet? A high-fiber diet is often recommended to prevent and treat constipation , hemorrhoids , diverticulitis , and irritable bowel syndrome . Eating a high-fiber diet can also help improve your cholesterol levels, lower your risk of coronary heart disease , reduce your risk of type 2 diabetes , and lower your weight. For people with type 1 or 2 diabetes, a high-fiber diet can also help stabilize blood sugar levels.    How Much Fiber beans, green pepper, onions, peas, potatoes (with skin), snow peas, spinach, squash, sweet potatoes, tomatoes, zucchini   For maximum fiber intake, eat the peels of fruits and vegetablesjust be sure to wash them well first.   Fruits   All fruits, especially apples, berries, grapefruits, mangoes, nectarines, oranges, peaches, pears, dried fruits (figs, dates, prunes, raisins)   Choose raw fruits and vegetables over juice, cooked, or cannedraw fruit has more fiber. Dried fruit is also a good source of fiber. Milk   With the exception of yogurt containing inulin (a type of fiber), dairy foods provide little fiber. Add more fiber by topping your yogurt or cottage cheese with fresh fruit, whole grain or bran cereals, nuts, or seeds. Meats and Beans   All beans and peas, especially Garbanzo beans, kidney beans, lentils, lima beans, split peas, and [place]de beans All nuts and seeds, especially almonds, peanuts, [place] nuts, cashews, peanut butter, walnuts, sesame and sunflower seeds All meat, poultry, fish, and eggs   Increase fiber in meat dishes by adding [place]de beans, kidney beans, black-eyed peas, bran, or oatmeal. If you are following a low-fat diet, use nuts and seeds only in moderation. Fats and Oils   All in moderation   Fats and oils do not provide fiber   Snacks, Sweets, and Condiments   Fruit Nuts Popcorn, whole-wheat pretzels, or trail mix made with dried fruits, nuts, and seeds Cakes, breads, and cookies made with oatmeal or whole-wheat flour   Most snack foods do not provide much fiber. Choose snacks with at least 2 grams of fiber per serving. Last Reviewed: March 2011 Cadence Del Toro MS, MPH, RD   Updated: 3/29/2011   ·     Keep Your Memory Marty Iraheta       Many factors can affect your ability to remembera hectic lifestyle, aging, stress, chronic disease, and certain medicines. But, there are steps you can take to sharpen your mind and help preserve your memory.    Challenge Your Brain   Regularly challenging supplement phosphatidylserine (PS). So far, though, the evidence is inconsistent as to whether these products can improve memory or thinking. If you are interested in taking herbs and supplements, talk to your doctor first because they may interact with other medicines that you are taking. Exercise Regularly    Among the many benefits of regular exercise are increased blood flow to the brain and decreased risk of certain diseases that can interfere with memory function. One study found that even moderate exercise has a beneficial effect. Examples of \"moderate\" exercise include:   Playing 18 holes of golf once a week, without a cart   Playing tennis twice a week   Walking one mile per day   Manage Stress    It can be tough to remember what is important when your mind is cluttered. Make time for relaxation. Choose activities that calm you down, and make it routine. Manage Chronic Conditions    Side effects of high blood pressure , diabetes, and heart disease can interfere with mental function. Many of the lifestyle steps discussed here can help manage these conditions. Strive to eat a healthy diet, exercise regularly, get stress under control, and follow your doctor's advice for your condition. Minimize Medications    Talk to your doctor about the medicines that you take. Some may be unnecessary. Also, healthy lifestyle habits may lower the need for certain drugs. Last Reviewed: April 2010 Radha Reveles MD   Updated: 4/13/2010   ·     [address] [place] [address]       As we get older, changes in balance, gait, strength, vision, hearing, and cognition make even the most youthful senior more prone to accidents. Falls are one of the leading health risks for older people.  This increased risk of falling is related to:   Aging process (eg, decreased muscle strength, slowed reflexes)   Higher incidence of chronic health problems (eg, arthritis, diabetes) that may limit mobility, agility or sensory awareness   Side effects of medicine (eg, dizziness, blurred vision)especially medicines like prescription pain medicines and drugs used to treat mental health conditions   Depending on the brittleness of your bones, the consequences of a fall can be serious and long lasting. Home Life   Research by the [employer] of [employer] [facility]) shows that some home accidents among older adults can be prevented by making simple lifestyle changes and basic modifications and repairs to the home environment. Here are some lifestyle changes that experts recommend:   Have your hearing and vision checked regularly. Be sure to wear prescription glasses that are right for you. Speak to your doctor or pharmacist about the possible side effects of your medicines. A number of medicines can cause dizziness. If you have problems with sleep, talk to your doctor. Limit your intake of alcohol. If necessary, use a cane or walker to help maintain your balance. Wear supportive, rubber-soled shoes, even at home. If you live in a region that gets wintry weather, you may want to put special cleats on your shoes to prevent you from slipping on the snow and ice. Exercise regularly to help maintain muscle tone, agility, and balance. Always hold the banister when going up or down stairs. Also, use  bars when getting in or out of the bath or shower, or using the toilet. To avoid dizziness, get up slowly from a lying down position. Sit up first, dangling your legs for a minute or two before rising to a standing position. Overall Home Safety Check   According to the Consumer Product Safety Commision's \"Older Consumer Home Safety Checklist,\" it is important to check for potential hazards in each room. And remember, proper lighting is an essential factor in home safety. If you cannot see clearly, you are more likely to fall.    Important questions to ask yourself include:   Are lamp, electric, extension, and telephone cords placed out of the flow of can wipe up spills instantly. You should also have a small fire extinguisher. Arrange your kitchen with frequently used items on lower shelves to avoid the need to stand on a stepstool to reach them. Make sure countertops are well-lit to avoid injuries while cutting and preparing food. In the Bathroom    Use a non-slip mat or decals in the tub and shower, since wet, soapy tile or porcelain surfaces are extremely slippery. Make sure bathroom rugs are non-skid or tape them firmly to the floor. Bathtubs should have at least one, preferably two, grab bars, firmly attached to structural supports in the wall. (Do not use built-in soap holders or glass shower doors as grab bars.)    Tub seats fitted with non-slip material on the legs allow you to wash sitting down. For people with limited mobility, bathtub transfer benches allow you to slide safely into the tub. Raised toilet seats and toilet safety rails are helpful for those with knee or hip problems. In the [facility]    Make sure you use a nightlight and that the area around your bed is clear of potential obstacles. Be careful with electric blankets and never go to sleep with a heating pad, which can cause serious burns even if on a low setting. Use fire-resistant mattress covers and pillows, and NEVER smoke in bed. Keep a phone next to the bed that is programmed to dial 911 at the push of a button. If you have a chronic condition, you may want to sign on with an automatic call-in service. Typically the system includes a small pendant that connects directly to an emergency medical voice-response system. You should also make arrangements to stay in contact with someonefriend, neighbor, family memberon a regular schedule. Fire Prevention   According to the [employer] (Smoke Alarms for Every) [facility], senior citizens are one of the two highest risk groups for death and serious injuries due to residential fires.    When cooking, meaningful. Follow-up care is a key part of your treatment and safety. Be sure to make and go to all appointments, and call your doctor if you are having problems. It's also a good idea to know your test results and keep a list of the medicines you take. What can you do to plan for the end of life? You can bring these issues up with your doctor. You do not need to wait until your doctor starts the conversation. You might start with \"I would not be willing to live with . Canary Malady Canary Malady Canary Malady \" When you complete this sentence it helps your doctor understand your wishes. Talk openly and honestly with your doctor. This is the best way to understand the decisions you will need to make as your health changes. Know that you can always change your mind. Ask your doctor about commonly used life-support measures. These include tube feedings, breathing machines, and fluids given through a vein (IV). Understanding these treatments will help you decide whether you want them. You may choose to have these life-supporting treatments for a limited time. This allows a trial period to see whether they will help you. You may also decide that you want your doctor to take only certain measures to keep you alive. It is important to spell out these conditions so that your doctor and family understand them. Talk to your doctor about how long you are likely to live. He or she may be able to give you an idea of what usually happens with your specific illness. Think about preparing papers that state your wishes. This way there will not be any confusion about what you want. You can change your instructions at any time. Which papers should you prepare? Advance directives are legal papers that tell doctors how you want to be cared for at the end of your life. You do not need a  to write these papers. Ask your doctor or your state health department for information on how to write your advance directives.  They may have the forms for each of these

## 2017-11-21 NOTE — PROGRESS NOTES
Medicare Annual Wellness Visit  Name: Sg Franco Date: 2017   MRN: [id number] Sex: Male   Age: 67 y.o. Ethnicity: Non-/Non    : 1945 Race: Eleonora Madden is here for Medicare AWV    Screenings for behavioral, psychosocial and functional/safety risks, and cognitive dysfunction are all negative except as indicated below. These results, as well as other patient data from the [address] form, are documented in Flowsheets linked to this Encounter. Allergies   Allergen Reactions    Tape Verline Fine Tape] Other (See Comments)     Rash and skin breakdown     Prior to Visit Medications    Medication Sig Taking?  Authorizing Provider   insulin glargine (LANTUS SOLOSTAR) 100 UNIT/ML injection pen 34 units in am, 32 units in pm Yes Ethan Mccullough MD   fluticasone (FLONASE) 50 MCG/ACT nasal spray 1 spray by Nasal route daily Yes Ethan Mccullough MD   guaiFENesin (MUCINEX) 600 MG extended release tablet Take 1 tablet by mouth 2 times daily Yes Ethan Mccullough MD   levothyroxine (SYNTHROID) 50 MCG tablet TAKE 1 TABLET MONDAY THROUGH SATURDAY, THEN TAKE ONE AND ONE-HALF TABLETS ON  Yes Ethan Mccullough MD   sertraline (ZOLOFT) 100 MG tablet TAKE 1 TABLET DAILY Yes Jenni Fitzgerald DO   Metoprolol Tartrate 75 MG TABS Take 75 mg by mouth 2 times daily Yes Nguyễn Romero CNP   Liraglutide (VICTOZA) 18 MG/3ML SOPN SC injection INJECT 1.8 MG INTO THE SKIN DAILY Yes Nguyễn Romero CNP   insulin lispro (HUMALOG KWIKPEN) 100 UNIT/ML pen Per sliding scale TID, AC (max 80 units daily) Yes Nguyễn Romero CNP   bumetanide (BUMEX) 2 MG tablet Take 1 tablet by mouth daily Yes Historical Provider, MD   amLODIPine (NORVASC) 10 MG tablet Take 1 tablet by mouth daily Yes Historical Provider, MD Sheral Osler 18 MCG inhalation capsule INHALE THE CONTENTS OF 1 CAPSULE DAILY Yes Sofía Vazquez PA-C   atorvastatin (LIPITOR) 80 MG tablet TAKE 1 TABLET DAILY Yes Rakesh Reese MD isosorbide mononitrate (IMDUR) 120 MG extended release tablet TAKE 1 TABLET EVERY MORNING Yes Amelia Zimmer MD   pantoprazole (PROTONIX) 40 MG tablet Take 1 tablet by mouth 2 times daily (before meals) Yes Radha Esqueda MD   EFFIENT 10 MG TABS Take 1 tablet by mouth daily Yes Historical Provider, MD   losartan (COZAAR) 50 MG tablet Take 1 tablet by mouth daily Yes Eliza Carter MD   metroNIDAZOLE (METROGEL) 0.75 % gel Apply topically 2 times daily. Yes Eliza Carter MD   nitroGLYCERIN (NITROSTAT) 0.4 MG SL tablet Take 1 tablet for chest pain and repeat after 5 min if no relief, call 911 and take another dose 5 min later. Max of 3 doses in 15 min. Yes Radha Esqueda MD   hydrALAZINE (APRESOLINE) 50 MG tablet Take 1 tablet by mouth 3 times daily as needed (Systolic blood pressure greater than 160) Yes Radha Esqueda MD   CPAP Machine MISC by Does not apply route Please change CPAP pressure to 14 cm H20. Yes Lorin Haynes PA-C   albuterol sulfate HFA (PROVENTIL HFA) 108 (90 BASE) MCG/ACT inhaler Inhale 2 puffs into the lungs every 6 hours as needed for Wheezing Yes Lorin Haynes PA-C   glucose blood VI test strips (ACCU-CHEK TERESA PLUS) strip Check BS 4 times a day Yes Pipo Gaona MD   aspirin 81 MG chewable tablet Take 1 tablet by mouth daily Yes Maria Dolores Khan MD   B-D ULTRAFINE III SHORT PEN 31G X 8 MM MISC USE TO INJECT INSULIN UNDER THE SKIN FOUR TIMES A DAY Yes ZHANNA Luong   Acetaminophen 650 MG TABS Take 650 mg by mouth every 4 hours as needed Yes Autumn Cotto MD   Cholecalciferol (VITAMIN D3) 2000 UNITS CAPS   Take 1 capsule by mouth daily  Yes Historical Provider, MD   glucagon 1 MG injection Inject 1 mg into the skin See Admin Instructions. Follow package directions for low blood sugar.  Yes Pipo Gaona MD   Multiple Vitamin (MULTI-VITAMIN) TABS   Take 1 tablet by mouth daily  Yes Historical Provider, MD     Past Medical History:   Diagnosis Date    Angina pectoris ([address]     Blood circulation, collateral     CAD (coronary artery disease) 1989    Status post bypass, Status post stents    Cancer ([facility] 0196,5196    Melanoma x2     Cancer ([facility] 05/2016    Prostate     Carotid artery disease ([facility] 2008    moderate    Cerebral artery occlusion with cerebral infarction ([facility]     CHF (congestive heart failure) ([facility] [address] [facility] 1985    CKD (chronic kidney disease), stage III 2012    has lost 13% more of kidney function 2016    COPD (chronic obstructive pulmonary disease) ([address]     Detached retina     Diabetes mellitus ([address]     Diabetic nephropathy/sclerosis ([address] 2012    Diverticula of colon 2006    GERD (gastroesophageal reflux disease)     Hearing loss     does not wear aides    History of blood transfusion     during open heart ? and During service    Hyperlipidemia 1989    Hypertension 1999    Hypothyroidism 2012    Peripheral vascular disease ([facility] 1999    Pneumonia     Prostate cancer ([facility] 2016    Retinopathy due to secondary diabetes ([facility]     Tobacco abuse     Type II or unspecified type diabetes mellitus without mention of complication, not stated as uncontrolled 1985    Vision blurred     rt eye    Vitreous hemorrhage of right eye ([address]     Wears glasses      Past Surgical History:   Procedure Laterality Date    APPENDECTOMY  1967    CARDIAC CATHETERIZATION      stents x2   [address], 1999    4 vessel bypass, 2 vwessel bypass    CATARACT REMOVAL WITH IMPLANT Bilateral October 13, right;  Oct 27, left eye    CHOLECYSTECTOMY  1990    laparoscopic    COLONOSCOPY  9/19/2006, 2015    Dr Veliz Pouch, dx: diverticulosis, benign polyps    CORONARY ARTERY BYPASS GRAFT  1989    x2 dr Ralph mcintyre cardiologist julien    CORONARY ARTERY BYPASS GRAFT  1999    3 vessel    CORONARY ARTERY BYPASS GRAFT  08/23/2016    Dr. Jakob Tello [facility]  Feb and March 2014    retina detached   [facility] from chest Sitting   Cuff Size: Medium Adult   Pulse: 64   Temp: 98.4 °F (36.9 °C)   TempSrc: Oral   SpO2: 96%   Weight: 267 lb (121.1 kg)   Height: 5' 11.26\" (1.81 m)         Patient's complete Health Risk Assessment and screening values have been reviewed and are found in Flowsheets. The following problems were reviewed today and where indicated follow up appointments were made and/or referrals ordered. Positive Risk Factor Screenings with Interventions:     Depression:  PHQ-2 Score: 4  Depression Screening Interpretation: (!) PHQ-9 Score 10-14: Moderate Depression  Depression Interventions:  · Patient declines any further evaluation/treatment for this issue  · He has been depressed due to his wife condition. He states he is doing better as she is doing beter. Health Habits/Nutrition:  Health Habits/Nutrition  Do you exercise for at least 20 minutes 2-3 times per week?: (!) No  Have you lost any weight without trying in the past 3 months?: No  Do you eat fewer than 2 meals per day?: No  Have you seen a dentist within the past year?: (!) No  Body mass index is 36.97 kg/m².   Health Habits/Nutrition Interventions:  · Dental exam overdue:  patient encouraged to make appointment with his/her dentist    Hearing/Vision:  Hearing/Vision  Do you or your family notice any trouble with your hearing?: (!) Yes  Do you have difficulty driving, watching TV, or doing any of your daily activities because of your eyesight?: No  Have you had an eye exam within the past year?: Yes  Hearing/Vision Interventions:  · Hearing concerns:  patient declines any further evaluation/treatment for hearing issues    Safety:  Safety  Do you have working smoke detectors?: Yes  Have all throw rugs been removed or fastened?: Yes  Do you have non-slip mats in all bathtubs?: (!) No  Do all of your stairways have a railing or banister?: Yes  Are your doorways, halls and stairs free of clutter?: Yes  Do you always fasten your seatbelt when you are in a car?: Yes  Safety Interventions:  · Home safety tips provided      Personalized Preventive Plan   Current Health Maintenance Status  Immunization History   Administered Date(s) Administered    Influenza Virus Vaccine 10/15/2013, 10/28/2015    Influenza Whole 09/14/2012, 09/03/2014    Influenza, High Dose 09/19/2016, 09/20/2017    Pneumococcal 13-valent Conjugate ([id number]) 12/23/2013    Pneumococcal Polysaccharide ([id number]) 02/25/2015    Tdap (Boostrix, Adacel) 01/28/2014    Zoster 09/03/2014        Health Maintenance   Topic Date Due    Diabetic foot exam  08/21/2017    Lipid screen  01/26/2018    Diabetic hemoglobin A1C test  05/23/2018    Diabetic retinal exam  08/23/2018    DTaP/Tdap/Td vaccine (2 - Td) 01/28/2024    Colon cancer screen colonoscopy  09/09/2025    Zostavax vaccine  Completed    Flu vaccine  Completed    Pneumococcal low/med risk  Completed    AAA screen  Completed     Recommendations for Preventive Services Due: see orders.   Recommended screening schedule for the next 5-10 years is provided to the patient in written form: see Patient Ramon Nice MD

## 2017-11-28 ENCOUNTER — OFFICE VISIT (OUTPATIENT)
Dept: PULMONOLOGY | Age: 72
End: 2017-11-28
Payer: MEDICARE

## 2017-11-28 VITALS
HEIGHT: 72 IN | DIASTOLIC BLOOD PRESSURE: 68 MMHG | HEART RATE: 68 BPM | SYSTOLIC BLOOD PRESSURE: 118 MMHG | BODY MASS INDEX: 36.3 KG/M2 | OXYGEN SATURATION: 98 % | WEIGHT: 268 LBS

## 2017-11-28 DIAGNOSIS — J44.9 CHRONIC OBSTRUCTIVE PULMONARY DISEASE, UNSPECIFIED COPD TYPE (HCC): Primary | ICD-10-CM

## 2017-11-28 PROCEDURE — 99213 OFFICE O/P EST LOW 20 MIN: CPT | Performed by: INTERNAL MEDICINE

## 2017-11-28 PROCEDURE — 3017F COLORECTAL CA SCREEN DOC REV: CPT | Performed by: INTERNAL MEDICINE

## 2017-11-28 PROCEDURE — 1036F TOBACCO NON-USER: CPT | Performed by: INTERNAL MEDICINE

## 2017-11-28 PROCEDURE — G8417 CALC BMI ABV UP PARAM F/U: HCPCS | Performed by: INTERNAL MEDICINE

## 2017-11-28 PROCEDURE — G8484 FLU IMMUNIZE NO ADMIN: HCPCS | Performed by: INTERNAL MEDICINE

## 2017-11-28 PROCEDURE — 3023F SPIROM DOC REV: CPT | Performed by: INTERNAL MEDICINE

## 2017-11-28 PROCEDURE — G8427 DOCREV CUR MEDS BY ELIG CLIN: HCPCS | Performed by: INTERNAL MEDICINE

## 2017-11-28 PROCEDURE — G8926 SPIRO NO PERF OR DOC: HCPCS | Performed by: INTERNAL MEDICINE

## 2017-11-28 PROCEDURE — 1123F ACP DISCUSS/DSCN MKR DOCD: CPT | Performed by: INTERNAL MEDICINE

## 2017-11-28 PROCEDURE — G8598 ASA/ANTIPLAT THER USED: HCPCS | Performed by: INTERNAL MEDICINE

## 2017-11-28 PROCEDURE — 4040F PNEUMOC VAC/ADMIN/RCVD: CPT | Performed by: INTERNAL MEDICINE

## 2017-11-28 NOTE — PROGRESS NOTES
stridor. No respiratory distress. He has no wheezes. He has no rales. He exhibits no tenderness. Abdominal: Soft. Bowel sounds are normal. He exhibits no distension and no mass. There is no tenderness. There is no rebound and no guarding. Musculoskeletal: Normal range of motion. He exhibits edema. He exhibits no tenderness.    Lymphadenopathy:        PFT's:                Assessment:       COPD  SOB due to SOB, CAD, CKD  GAURI on BiPAP  Plan:       Stop Spiriva  Anoro samples giving  Continue BiPAP  RTC in 3 mos with Spirometry  Call for refills

## 2017-12-05 ENCOUNTER — CARE COORDINATION (OUTPATIENT)
Dept: CARE COORDINATION | Age: 72
End: 2017-12-05

## 2017-12-12 DIAGNOSIS — E78.5 HYPERLIPIDEMIA, UNSPECIFIED HYPERLIPIDEMIA TYPE: ICD-10-CM

## 2017-12-12 RX ORDER — ATORVASTATIN CALCIUM 80 MG/1
TABLET, FILM COATED ORAL
Qty: 90 TABLET | Refills: 1 | Status: SHIPPED | OUTPATIENT
Start: 2017-12-12 | End: 2018-06-10 | Stop reason: SDUPTHER

## 2017-12-18 ENCOUNTER — HOSPITAL ENCOUNTER (OUTPATIENT)
Age: 72
Discharge: HOME | End: 2017-12-18
Payer: MEDICARE

## 2017-12-18 DIAGNOSIS — C61 PROSTATE CANCER (HCC): ICD-10-CM

## 2017-12-18 LAB — PROSTATE SPECIFIC ANTIGEN: < 0.02 NG/ML (ref 0–1)

## 2017-12-18 PROCEDURE — 36415 COLL VENOUS BLD VENIPUNCTURE: CPT

## 2017-12-18 PROCEDURE — 84153 ASSAY OF PSA TOTAL: CPT

## 2017-12-19 ENCOUNTER — CARE COORDINATION (OUTPATIENT)
Dept: CARE COORDINATION | Age: 72
End: 2017-12-19

## 2017-12-19 NOTE — CARE COORDINATION
Ambulatory Care Coordination Note  12/19/2017  CM Risk Score: 5  Sherrill Mortality Risk Score: 25.42    ACC: Didi Fish RN    Summary Note: Spoke with [place] Sachin. Reports feeling good. Denies chest pain. Denies leg swelling, weight gain, SOB. Discussed early symptom recognition and reporting to prevent hospital and ED. Discussed calling 911 for unrelieved chest pain and reviewed NTG instructions. Reminded of same day appointments. Diabetes Assessment    Meal Planning:  Avoidance of concentrated sweets   How often do you test your blood sugar?:  Daily, Meals, Bedtime   Do you have barriers with adherence to non-pharmacologic self-management interventions?  (Nutrition/Exercise/Self-Monitoring):  Yes   Have you ever had to go to the ED for symptoms of low blood sugar?:  No       No patient-reported symptoms   Do you have hyperglycemia symptoms?:  No   Do you have hypoglycemia symptoms?:  No   Blood Sugar Monitoring Regimen:  Before Meals   Blood Sugar Trends:  No Change      ,   Congestive Heart Failure Assessment    Do you understand a low sodium diet?:  Yes  Do you understand how to read food labels?:  Yes  How many restaurant meals do you eat per week?:  1-2  Do you salt your food before tasting it?:  No     No patient-reported symptoms      Symptoms:   None:  Yes      Symptom course:  stable  Weight trend:  stable  Salt intake watch compared to last visit:  stable      and   COPD Assessment    Does the patient understand envrionmental exposure?:  Yes  Is the patient able to verbalize Rescue vs. Long Acting medications?:  Yes  Does the patient have a nebulizer?:  No     No patient-reported symptoms         Symptoms:   None:  Yes      Symptom course:  stable  Breathlessness:  none  Increase use of rapid acting/rescue inhaled medications?:  No  Change in chronic cough?:  No/At Baseline  Change in sputum?:  No/At Baseline  Have you had a recent diagnosis of pneumonia either by PCP or at a hospital?:  No CNP   bumetanide (BUMEX) 2 MG tablet Take 1 tablet by mouth daily 5/1/17   Historical Provider, MD   amLODIPine (NORVASC) 10 MG tablet Take 1 tablet by mouth daily 3/2/17   Historical Provider, MD Neptali Agarwal 18 MCG inhalation capsule INHALE THE CONTENTS OF 1 CAPSULE DAILY 4/17/17   Malina Ngo PA-C   isosorbide mononitrate (IMDUR) 120 MG extended release tablet TAKE 1 TABLET EVERY MORNING 2/14/17   Joellen Kirkpatrick MD   pantoprazole (PROTONIX) 40 MG tablet Take 1 tablet by mouth 2 times daily (before meals) 1/27/17   Colt Collado MD   EFFIENT 10 MG TABS Take 1 tablet by mouth daily 11/11/16   Historical Provider, MD   losartan (COZAAR) 50 MG tablet Take 1 tablet by mouth daily 9/19/16   Bharati Lewis MD   metroNIDAZOLE (METROGEL) 0.75 % gel Apply topically 2 times daily. 9/19/16   Bharati Lewis MD   nitroGLYCERIN (NITROSTAT) 0.4 MG SL tablet Take 1 tablet for chest pain and repeat after 5 min if no relief, call 911 and take another dose 5 min later. Max of 3 doses in 15 min.  9/3/16   Maria Dolores John MD   hydrALAZINE (APRESOLINE) 50 MG tablet Take 1 tablet by mouth 3 times daily as needed (Systolic blood pressure greater than 160) 9/3/16   Colt Collado MD   CPAP Machine MISC by Does not apply route Please change CPAP pressure to 14 cm H20. 8/22/16   Malinataya Ngo PA-C   albuterol sulfate HFA (PROVENTIL HFA) 108 (90 BASE) MCG/ACT inhaler Inhale 2 puffs into the lungs every 6 hours as needed for Wheezing 8/22/16   Malinataya Ngo PA-C   glucose blood VI test strips (ACCU-CHEK TERESA PLUS) strip Check BS 4 times a day 4/19/16   Raissa Huntley MD   aspirin 81 MG chewable tablet Take 1 tablet by mouth daily 4/17/16   Colt Collado MD   B-D ULTRAFINE III SHORT PEN 31G X 8 MM MISC USE TO INJECT INSULIN UNDER THE SKIN FOUR TIMES A DAY 8/20/15   ZHANNA Mata   Acetaminophen 650 MG TABS Take 650 mg by mouth every 4 hours as needed 6/1/15   Jean MD Oscar   Cholecalciferol (VITAMIN D3) 2000

## 2017-12-20 RX ORDER — METOPROLOL TARTRATE 75 MG/1
TABLET, FILM COATED ORAL
Qty: 180 TABLET | Refills: 0 | Status: SHIPPED | OUTPATIENT
Start: 2017-12-20 | End: 2018-03-21 | Stop reason: SDUPTHER

## 2017-12-20 NOTE — TELEPHONE ENCOUNTER
Requested Prescriptions     Pending Prescriptions Disp Refills    Metoprolol Tartrate 75 MG TABS [Pharmacy Med Name: METOPROLOL TARTRATE TABS 75MG] 180 tablet 1     Sig: TAKE 1 TABLET TWICE A DAY      LV-11/21/2017  NV-

## 2018-01-10 ENCOUNTER — OFFICE VISIT (OUTPATIENT)
Dept: UROLOGY | Age: 73
End: 2018-01-10
Payer: MEDICARE

## 2018-01-10 VITALS — BODY MASS INDEX: 36.3 KG/M2 | WEIGHT: 268 LBS | HEIGHT: 72 IN

## 2018-01-10 DIAGNOSIS — C61 PROSTATE CANCER (HCC): Primary | ICD-10-CM

## 2018-01-10 PROCEDURE — 99213 OFFICE O/P EST LOW 20 MIN: CPT | Performed by: NURSE PRACTITIONER

## 2018-01-10 RX ORDER — CALCITRIOL 0.25 UG/1
0.25 CAPSULE, LIQUID FILLED ORAL EVERY OTHER DAY
Status: ON HOLD | COMMUNITY
End: 2020-01-01 | Stop reason: ALTCHOICE

## 2018-01-10 NOTE — PROGRESS NOTES
reviewed. Constitutional: Alert and oriented times 3, no acute distress and cooperative to examination with appropriate mood and affect. HENT:   Head:        Normocephalic and atraumatic. Mouth/Throat:         Mucous membranes are normal.   Eyes:         EOM are normal. No scleral icterus. PERRLA. Neck:        Supple, symmetrical, trachea midline, no adenopathy, thyroid symmetric, not enlarged and no tenderness. Cardiovascular:        Normal rate, regular rhythm, S1 S2 heart sounds. No murmurs, rub, or gallops. Pulses:       Radial pulses are 2+/4 bilateral and equal. Posterior tibialis 2+/4 bilateral and equal  Pulmonary/Chest:      Chest symmetric with normal A/P diameter,  Diminished  Bases, + exp wheeze. Occasional nonproductive cough. Abdominal:         Soft. No tenderness. No rebound, no guarding and no CVA tenderness. Bowel sounds present. Musculoskeletal:         Normal range of motion. No edema or tenderness of lower extremities. Extremities: No cyanosis, clubbing.  + bilateral lower extremity 1+ edema  Neurological:        Alert and oriented. No cranial nerve deficit. There are no focalizing motor or sensory deficits. CN II-XII are grossly intact. Skin:       Skin color, texture, turgor normal. No rashes or lesions. Psychiatric:        Normal mood and affect.     Labs   Urine dip demonstrates   Results for POC orders placed in visit on 01/10/18   POCT Urinalysis No Micro (Auto)   Result Value Ref Range    Glucose, Ur Negative NEGATIVE mg/dl    Bilirubin Urine Negative     Ketones, Urine Negative NEGATIVE    Specific Gravity, Urine 1.020 1.002 - 1.03    Blood, UA POC Trace-lysed NEGATIVE    pH, Urine 5.00 5.0 - 9.0    Protein, Urine >= 300 (A) NEGATIVE mg/dl    Urobilinogen, Urine 0.20 0.0 - 1.0 eu/dl    Nitrite, Urine Negative NEGATIVE    Leukocyte Clumps, Urine Negative NEGATIVE    Color, Urine Yellow YELLOW-STR    Character, Urine Clear [id number]       Surgical Pathology  FINAL DIAGNOSIS:  A.  Lymph nodes, right pelvic, dissection:   No evidence of malignancy. Milad Thomas, radical prostatectomy:   Invasive prostatic adenocarcinoma.   [place] score 3+4 = 7 (grade group 2).  Tumor volume: 15%.   Margins: Negative for malignancy.   Extraprostatic extension: Not identified.   Perineural invasion: Present.   Pathologic stage: pT2c, pN0. Assessment & Plan  Prostate Cancer    Pt's PSA continues at <.02 showing continued excellent prostate cancer control at this time. Pt and his wife are happy with the results. Will check next PSA in 6 months and if stable at that time will likely move out to yearly. Urinary stress incontinence improved notes only occasional leaking. Follow up in 6 months with PSA prior to appt.

## 2018-01-17 ENCOUNTER — CARE COORDINATION (OUTPATIENT)
Dept: CARE COORDINATION | Age: 73
End: 2018-01-17

## 2018-01-17 ENCOUNTER — TELEPHONE (OUTPATIENT)
Dept: FAMILY MEDICINE CLINIC | Age: 73
End: 2018-01-17

## 2018-01-17 NOTE — CARE COORDINATION
1 TABLET DAILY 10/2/17  Yes Luis Dudley DO   Liraglutide (VICTOZA) 18 MG/3ML SOPN SC injection INJECT 1.8 MG INTO THE SKIN DAILY 6/27/17  Yes Suzy Silva CNP   bumetanide (BUMEX) 2 MG tablet Take 1 tablet by mouth daily 5/1/17  Yes Historical Provider, MD Joyce Hickman 18 MCG inhalation capsule INHALE THE CONTENTS OF 1 CAPSULE DAILY 4/17/17  Yes Gurmeet Colon PA-C   isosorbide mononitrate (IMDUR) 120 MG extended release tablet TAKE 1 TABLET EVERY MORNING 2/14/17  Yes Flaquito Millard MD   EFFIENT 10 MG TABS Take 1 tablet by mouth daily 11/11/16  Yes Historical Provider, MD   losartan (COZAAR) 50 MG tablet Take 1 tablet by mouth daily 9/19/16  Yes Giovanna Collier MD   metroNIDAZOLE (METROGEL) 0.75 % gel Apply topically 2 times daily. 9/19/16  Yes Giovanna Collier MD   nitroGLYCERIN (NITROSTAT) 0.4 MG SL tablet Take 1 tablet for chest pain and repeat after 5 min if no relief, call 911 and take another dose 5 min later. Max of 3 doses in 15 min.  9/3/16  Yes Momo Villar MD   hydrALAZINE (APRESOLINE) 50 MG tablet Take 1 tablet by mouth 3 times daily as needed (Systolic blood pressure greater than 160) 9/3/16  Yes Momo Villar MD   CPAP Machine MISC by Does not apply route Please change CPAP pressure to 14 cm H20. 8/22/16  Yes Gurmeet Colon PA-C   albuterol sulfate HFA (PROVENTIL HFA) 108 (90 BASE) MCG/ACT inhaler Inhale 2 puffs into the lungs every 6 hours as needed for Wheezing 8/22/16  Yes Gurmeet Colon PA-C   glucose blood VI test strips (ACCU-CHEK TERESA PLUS) strip Check BS 4 times a day 4/19/16  Yes Eladia Candelaria MD   aspirin 81 MG chewable tablet Take 1 tablet by mouth daily 4/17/16  Yes Momo Villar MD   B-D ULTRAFINE III SHORT PEN 31G X 8 MM MISC USE TO INJECT INSULIN UNDER THE SKIN FOUR TIMES A DAY 8/20/15  Yes Chanel Flight, APRN   Acetaminophen 650 MG TABS Take 650 mg by mouth every 4 hours as needed 6/1/15  Yes Autumn Cotto MD   Cholecalciferol (VITAMIN D3) 2000 UNITS CAPS Take 1 capsule by mouth daily    Yes Historical Provider, MD   glucagon 1 MG injection Inject 1 mg into the skin See Admin Instructions. Follow package directions for low blood sugar.  10/11/12  Yes Aleah Gaviria MD   Multiple Vitamin (MULTI-VITAMIN) TABS   Take 1 tablet by mouth daily    Yes Historical Provider, MD       Future Appointments  Date Time Provider Dean Simpson   1/25/2018 3:00 PM STR CARDIAC CATHETERIZATION RM1 STRZ CATH LB None   2/26/2018 11:00 AM STR PULMONARY FUNCTION ROOM 1 STRZ PFT None   3/15/2018 3:20 PM Nicki Romo 354   7/11/2018 10:00 AM Lis Simeon, [address] Urology [email or web address]

## 2018-01-17 NOTE — TELEPHONE ENCOUNTER
01/17/2018 Patient called office asking why he cannot get his medication refill without being seen. Patient last seen 10/2017. Patient phone# [phone]. Patient asking for his zoloft. He is almost out of medication. da

## 2018-01-18 RX ORDER — SERTRALINE HYDROCHLORIDE 100 MG/1
100 TABLET, FILM COATED ORAL DAILY
Qty: 30 TABLET | Refills: 0 | Status: ON HOLD | OUTPATIENT
Start: 2018-01-18 | End: 2018-03-21 | Stop reason: SDUPTHER

## 2018-01-18 RX ORDER — SERTRALINE HYDROCHLORIDE 100 MG/1
TABLET, FILM COATED ORAL
Qty: 90 TABLET | Refills: 1 | Status: SHIPPED | OUTPATIENT
Start: 2018-01-18 | End: 2018-07-24 | Stop reason: SDUPTHER

## 2018-01-24 ENCOUNTER — HOSPITAL ENCOUNTER (OUTPATIENT)
Age: 73
Discharge: HOME | End: 2018-01-25
Attending: INTERNAL MEDICINE | Admitting: INTERNAL MEDICINE
Payer: MEDICARE

## 2018-01-24 DIAGNOSIS — I10 ESSENTIAL HYPERTENSION: Primary | ICD-10-CM

## 2018-01-24 PROBLEM — N19 RENAL FAILURE: Status: ACTIVE | Noted: 2018-01-24

## 2018-01-24 LAB
ANION GAP SERPL CALCULATED.3IONS-SCNC: 15 MEQ/L (ref 8–16)
BUN BLDV-MCNC: 41 MG/DL (ref 7–22)
CALCIUM SERPL-MCNC: 9 MG/DL (ref 8.5–10.5)
CHLORIDE BLD-SCNC: 103 MEQ/L (ref 98–111)
CO2: 21 MEQ/L (ref 23–33)
CREAT SERPL-MCNC: 2.3 MG/DL (ref 0.4–1.2)
GFR SERPL CREATININE-BSD FRML MDRD: 28 ML/MIN/1.73M2
GLUCOSE BLD-MCNC: 201 MG/DL (ref 70–108)
GLUCOSE BLD-MCNC: 219 MG/DL (ref 70–108)
GLUCOSE BLD-MCNC: 258 MG/DL (ref 70–108)
HCT VFR BLD CALC: 41.3 % (ref 42–52)
HEMOGLOBIN: 13.7 GM/DL (ref 14–18)
MCH RBC QN AUTO: 30.3 PG (ref 27–31)
MCHC RBC AUTO-ENTMCNC: 33.1 GM/DL (ref 33–37)
MCV RBC AUTO: 91.6 FL (ref 80–94)
PDW BLD-RTO: 14.9 % (ref 11.5–14.5)
PLATELET # BLD: 262 THOU/MM3 (ref 130–400)
PMV BLD AUTO: 9.3 MCM (ref 7.4–10.4)
POTASSIUM SERPL-SCNC: 4.8 MEQ/L (ref 3.5–5.2)
RBC # BLD: 4.51 MILL/MM3 (ref 4.7–6.1)
SODIUM BLD-SCNC: 139 MEQ/L (ref 135–145)
WBC # BLD: 8.1 THOU/MM3 (ref 4.8–10.8)

## 2018-01-24 PROCEDURE — 6370000000 HC RX 637 (ALT 250 FOR IP): Performed by: INTERNAL MEDICINE

## 2018-01-24 PROCEDURE — 82948 REAGENT STRIP/BLOOD GLUCOSE: CPT

## 2018-01-24 PROCEDURE — 36415 COLL VENOUS BLD VENIPUNCTURE: CPT

## 2018-01-24 PROCEDURE — 85027 COMPLETE CBC AUTOMATED: CPT

## 2018-01-24 PROCEDURE — 80048 BASIC METABOLIC PNL TOTAL CA: CPT

## 2018-01-24 PROCEDURE — 2580000003 HC RX 258: Performed by: INTERNAL MEDICINE

## 2018-01-24 RX ORDER — PRASUGREL 10 MG/1
10 TABLET, FILM COATED ORAL DAILY
Status: DISCONTINUED | OUTPATIENT
Start: 2018-01-24 | End: 2018-01-25 | Stop reason: HOSPADM

## 2018-01-24 RX ORDER — INSULIN GLARGINE 100 [IU]/ML
32 INJECTION, SOLUTION SUBCUTANEOUS NIGHTLY
Status: DISCONTINUED | OUTPATIENT
Start: 2018-01-24 | End: 2018-01-25 | Stop reason: HOSPADM

## 2018-01-24 RX ORDER — LEVOTHYROXINE SODIUM 0.05 MG/1
50 TABLET ORAL DAILY
Status: DISCONTINUED | OUTPATIENT
Start: 2018-01-24 | End: 2018-01-25 | Stop reason: HOSPADM

## 2018-01-24 RX ORDER — ASPIRIN 81 MG/1
81 TABLET, CHEWABLE ORAL DAILY
Status: DISCONTINUED | OUTPATIENT
Start: 2018-01-24 | End: 2018-01-25 | Stop reason: HOSPADM

## 2018-01-24 RX ORDER — ISOSORBIDE MONONITRATE 60 MG/1
120 TABLET, EXTENDED RELEASE ORAL DAILY
Status: DISCONTINUED | OUTPATIENT
Start: 2018-01-24 | End: 2018-01-25 | Stop reason: HOSPADM

## 2018-01-24 RX ORDER — SODIUM CHLORIDE 450 MG/100ML
INJECTION, SOLUTION INTRAVENOUS CONTINUOUS
Status: DISCONTINUED | OUTPATIENT
Start: 2018-01-24 | End: 2018-01-25 | Stop reason: SDUPTHER

## 2018-01-24 RX ORDER — HYDRALAZINE HYDROCHLORIDE 50 MG/1
50 TABLET, FILM COATED ORAL 3 TIMES DAILY PRN
Status: DISCONTINUED | OUTPATIENT
Start: 2018-01-24 | End: 2018-01-25 | Stop reason: HOSPADM

## 2018-01-24 RX ORDER — METOPROLOL TARTRATE 75 MG/1
75 TABLET, FILM COATED ORAL 2 TIMES DAILY
Status: DISCONTINUED | OUTPATIENT
Start: 2018-01-24 | End: 2018-01-25 | Stop reason: HOSPADM

## 2018-01-24 RX ORDER — FLUTICASONE PROPIONATE 50 MCG
1 SPRAY, SUSPENSION (ML) NASAL DAILY
Status: DISCONTINUED | OUTPATIENT
Start: 2018-01-24 | End: 2018-01-25 | Stop reason: HOSPADM

## 2018-01-24 RX ORDER — ATORVASTATIN CALCIUM 80 MG/1
80 TABLET, FILM COATED ORAL DAILY
Status: DISCONTINUED | OUTPATIENT
Start: 2018-01-24 | End: 2018-01-25 | Stop reason: HOSPADM

## 2018-01-24 RX ORDER — MULTIVITAMIN WITH FOLIC ACID 400 MCG
1 TABLET ORAL DAILY
Status: DISCONTINUED | OUTPATIENT
Start: 2018-01-24 | End: 2018-01-25 | Stop reason: HOSPADM

## 2018-01-24 RX ORDER — ALBUTEROL SULFATE 90 UG/1
2 AEROSOL, METERED RESPIRATORY (INHALATION) EVERY 6 HOURS PRN
Status: DISCONTINUED | OUTPATIENT
Start: 2018-01-24 | End: 2018-01-25 | Stop reason: HOSPADM

## 2018-01-24 RX ORDER — SERTRALINE HYDROCHLORIDE 100 MG/1
100 TABLET, FILM COATED ORAL DAILY
Status: DISCONTINUED | OUTPATIENT
Start: 2018-01-24 | End: 2018-01-25 | Stop reason: HOSPADM

## 2018-01-24 RX ORDER — LOSARTAN POTASSIUM 50 MG/1
50 TABLET ORAL DAILY
Status: DISCONTINUED | OUTPATIENT
Start: 2018-01-24 | End: 2018-01-25 | Stop reason: HOSPADM

## 2018-01-24 RX ORDER — CALCITRIOL 0.25 UG/1
0.25 CAPSULE, LIQUID FILLED ORAL DAILY
Status: DISCONTINUED | OUTPATIENT
Start: 2018-01-24 | End: 2018-01-25 | Stop reason: HOSPADM

## 2018-01-24 RX ADMIN — INSULIN GLARGINE 32 UNITS: 100 INJECTION, SOLUTION SUBCUTANEOUS at 23:01

## 2018-01-24 RX ADMIN — ATORVASTATIN CALCIUM 80 MG: 80 TABLET, FILM COATED ORAL at 22:53

## 2018-01-24 RX ADMIN — SODIUM CHLORIDE: 4.5 INJECTION, SOLUTION INTRAVENOUS at 19:13

## 2018-01-24 RX ADMIN — METOPROLOL TARTRATE 75 MG: 75 TABLET, FILM COATED ORAL at 22:53

## 2018-01-24 RX ADMIN — ACETYLCYSTEINE 500 MG: 500 TABLET, EFFERVESCENT ORAL at 22:54

## 2018-01-25 ENCOUNTER — APPOINTMENT (OUTPATIENT)
Dept: CARDIAC CATH/INVASIVE PROCEDURES | Age: 73
End: 2018-01-25
Attending: INTERNAL MEDICINE
Payer: MEDICARE

## 2018-01-25 VITALS
OXYGEN SATURATION: 97 % | RESPIRATION RATE: 16 BRPM | DIASTOLIC BLOOD PRESSURE: 80 MMHG | HEART RATE: 63 BPM | WEIGHT: 266 LBS | TEMPERATURE: 97.8 F | SYSTOLIC BLOOD PRESSURE: 142 MMHG | HEIGHT: 72 IN | BODY MASS INDEX: 36.03 KG/M2

## 2018-01-25 PROBLEM — R94.39 ABNORMAL NUCLEAR STRESS TEST: Status: ACTIVE | Noted: 2018-01-25

## 2018-01-25 PROBLEM — E66.9 OBESITY (BMI 30.0-34.9): Status: ACTIVE | Noted: 2018-01-25

## 2018-01-25 PROBLEM — I50.42 CHRONIC COMBINED SYSTOLIC AND DIASTOLIC CONGESTIVE HEART FAILURE (HCC): Status: ACTIVE | Noted: 2018-01-25

## 2018-01-25 PROBLEM — Z82.49 FAMILY HISTORY OF CLASS III ANGINA PECTORIS: Status: ACTIVE | Noted: 2018-01-25

## 2018-01-25 LAB
CHOLESTEROL, TOTAL: 105 MG/DL (ref 100–199)
EKG ATRIAL RATE: 49 BPM
EKG Q-T INTERVAL: 518 MS
EKG QRS DURATION: 188 MS
EKG QTC CALCULATION (BAZETT): 530 MS
EKG R AXIS: -81 DEGREES
EKG T AXIS: 94 DEGREES
EKG VENTRICULAR RATE: 63 BPM
GLUCOSE BLD-MCNC: 151 MG/DL (ref 70–108)
GLUCOSE BLD-MCNC: 175 MG/DL (ref 70–108)
GLUCOSE BLD-MCNC: 198 MG/DL (ref 70–108)
HCT VFR BLD CALC: 39.4 % (ref 42–52)
HDLC SERPL-MCNC: 33 MG/DL
HEMOGLOBIN: 13.6 GM/DL (ref 14–18)
INR BLD: 1.03 (ref 0.85–1.13)
LDL CHOLESTEROL CALCULATED: 51 MG/DL
MCH RBC QN AUTO: 31.4 PG (ref 27–31)
MCHC RBC AUTO-ENTMCNC: 34.7 GM/DL (ref 33–37)
MCV RBC AUTO: 90.4 FL (ref 80–94)
PDW BLD-RTO: 14.5 % (ref 11.5–14.5)
PLATELET # BLD: 213 THOU/MM3 (ref 130–400)
PMV BLD AUTO: 8.7 MCM (ref 7.4–10.4)
RBC # BLD: 4.35 MILL/MM3 (ref 4.7–6.1)
TRIGL SERPL-MCNC: 103 MG/DL (ref 0–199)
WBC # BLD: 6.7 THOU/MM3 (ref 4.8–10.8)

## 2018-01-25 PROCEDURE — 6360000002 HC RX W HCPCS

## 2018-01-25 PROCEDURE — 93005 ELECTROCARDIOGRAM TRACING: CPT

## 2018-01-25 PROCEDURE — C1769 GUIDE WIRE: HCPCS

## 2018-01-25 PROCEDURE — 6370000000 HC RX 637 (ALT 250 FOR IP): Performed by: INTERNAL MEDICINE

## 2018-01-25 PROCEDURE — 93459 L HRT ART/GRFT ANGIO: CPT | Performed by: INTERNAL MEDICINE

## 2018-01-25 PROCEDURE — 2780000010 HC IMPLANT OTHER

## 2018-01-25 PROCEDURE — 85027 COMPLETE CBC AUTOMATED: CPT

## 2018-01-25 PROCEDURE — 85610 PROTHROMBIN TIME: CPT

## 2018-01-25 PROCEDURE — 2580000003 HC RX 258: Performed by: INTERNAL MEDICINE

## 2018-01-25 PROCEDURE — C1894 INTRO/SHEATH, NON-LASER: HCPCS

## 2018-01-25 PROCEDURE — 36415 COLL VENOUS BLD VENIPUNCTURE: CPT

## 2018-01-25 PROCEDURE — A6258 TRANSPARENT FILM >16<=48 IN: HCPCS

## 2018-01-25 PROCEDURE — 2500000003 HC RX 250 WO HCPCS

## 2018-01-25 PROCEDURE — 82948 REAGENT STRIP/BLOOD GLUCOSE: CPT

## 2018-01-25 PROCEDURE — 80061 LIPID PANEL: CPT

## 2018-01-25 PROCEDURE — 96361 HYDRATE IV INFUSION ADD-ON: CPT

## 2018-01-25 RX ORDER — SODIUM CHLORIDE 0.9 % (FLUSH) 0.9 %
10 SYRINGE (ML) INJECTION PRN
Status: DISCONTINUED | OUTPATIENT
Start: 2018-01-25 | End: 2018-01-25 | Stop reason: HOSPADM

## 2018-01-25 RX ORDER — ASPIRIN 325 MG
325 TABLET ORAL ONCE
Status: DISCONTINUED | OUTPATIENT
Start: 2018-01-25 | End: 2018-01-25 | Stop reason: SDUPTHER

## 2018-01-25 RX ORDER — SODIUM CHLORIDE 9 MG/ML
100 INJECTION, SOLUTION INTRAVENOUS CONTINUOUS
Status: ACTIVE | OUTPATIENT
Start: 2018-01-25 | End: 2018-01-25

## 2018-01-25 RX ORDER — NITROGLYCERIN 0.4 MG/1
0.4 TABLET SUBLINGUAL EVERY 5 MIN PRN
Status: DISCONTINUED | OUTPATIENT
Start: 2018-01-25 | End: 2018-01-25 | Stop reason: HOSPADM

## 2018-01-25 RX ORDER — ALPRAZOLAM 0.5 MG/1
0.5 TABLET ORAL
Status: DISCONTINUED | OUTPATIENT
Start: 2018-01-25 | End: 2018-01-25 | Stop reason: HOSPADM

## 2018-01-25 RX ORDER — DIPHENHYDRAMINE HCL 25 MG
25 TABLET ORAL
Status: DISCONTINUED | OUTPATIENT
Start: 2018-01-25 | End: 2018-01-25 | Stop reason: HOSPADM

## 2018-01-25 RX ORDER — ATROPINE SULFATE 0.4 MG/ML
0.5 AMPUL (ML) INJECTION
Status: DISCONTINUED | OUTPATIENT
Start: 2018-01-25 | End: 2018-01-25 | Stop reason: HOSPADM

## 2018-01-25 RX ORDER — SODIUM CHLORIDE 0.9 % (FLUSH) 0.9 %
10 SYRINGE (ML) INJECTION EVERY 12 HOURS SCHEDULED
Status: DISCONTINUED | OUTPATIENT
Start: 2018-01-25 | End: 2018-01-25 | Stop reason: HOSPADM

## 2018-01-25 RX ORDER — ACETAMINOPHEN 325 MG/1
650 TABLET ORAL EVERY 4 HOURS PRN
Status: DISCONTINUED | OUTPATIENT
Start: 2018-01-25 | End: 2018-01-25 | Stop reason: HOSPADM

## 2018-01-25 RX ORDER — ONDANSETRON 2 MG/ML
4 INJECTION INTRAMUSCULAR; INTRAVENOUS EVERY 6 HOURS PRN
Status: DISCONTINUED | OUTPATIENT
Start: 2018-01-25 | End: 2018-01-25 | Stop reason: HOSPADM

## 2018-01-25 RX ORDER — SODIUM CHLORIDE 9 MG/ML
INJECTION, SOLUTION INTRAVENOUS CONTINUOUS
Status: DISCONTINUED | OUTPATIENT
Start: 2018-01-25 | End: 2018-01-25 | Stop reason: SDUPTHER

## 2018-01-25 RX ORDER — DIPHENHYDRAMINE HCL 25 MG
50 TABLET ORAL ONCE
Status: DISCONTINUED | OUTPATIENT
Start: 2018-01-25 | End: 2018-01-25 | Stop reason: HOSPADM

## 2018-01-25 RX ORDER — SODIUM CHLORIDE 0.9 % (FLUSH) 0.9 %
10 SYRINGE (ML) INJECTION EVERY 12 HOURS SCHEDULED
Status: DISCONTINUED | OUTPATIENT
Start: 2018-01-25 | End: 2018-01-25 | Stop reason: SDUPTHER

## 2018-01-25 RX ORDER — SODIUM CHLORIDE 0.9 % (FLUSH) 0.9 %
10 SYRINGE (ML) INJECTION PRN
Status: DISCONTINUED | OUTPATIENT
Start: 2018-01-25 | End: 2018-01-25 | Stop reason: SDUPTHER

## 2018-01-25 RX ADMIN — ACETYLCYSTEINE 500 MG: 500 TABLET, EFFERVESCENT ORAL at 20:57

## 2018-01-25 RX ADMIN — SODIUM CHLORIDE 100 ML/HR: 9 INJECTION, SOLUTION INTRAVENOUS at 11:55

## 2018-01-25 RX ADMIN — CALCITRIOL 0.25 MCG: 0.25 CAPSULE, LIQUID FILLED ORAL at 11:51

## 2018-01-25 RX ADMIN — ASPIRIN 81 MG: 81 TABLET, CHEWABLE ORAL at 06:41

## 2018-01-25 RX ADMIN — SODIUM CHLORIDE: 4.5 INJECTION, SOLUTION INTRAVENOUS at 03:57

## 2018-01-25 RX ADMIN — PRASUGREL 10 MG: 10 TABLET, FILM COATED ORAL at 06:41

## 2018-01-25 RX ADMIN — SERTRALINE HYDROCHLORIDE 100 MG: 100 TABLET, FILM COATED ORAL at 11:53

## 2018-01-25 RX ADMIN — ISOSORBIDE MONONITRATE 120 MG: 60 TABLET, EXTENDED RELEASE ORAL at 11:51

## 2018-01-25 RX ADMIN — LOSARTAN POTASSIUM 50 MG: 50 TABLET ORAL at 11:52

## 2018-01-25 RX ADMIN — METOPROLOL TARTRATE 75 MG: 75 TABLET, FILM COATED ORAL at 11:52

## 2018-01-25 RX ADMIN — ACETYLCYSTEINE 500 MG: 500 TABLET, EFFERVESCENT ORAL at 10:00

## 2018-01-25 ASSESSMENT — PAIN SCALES - GENERAL
PAINLEVEL_OUTOF10: 0
PAINLEVEL_OUTOF10: 0

## 2018-01-25 NOTE — H&P
[facility]   Sedation/Analgesia History and Physical    Pt Name: Angel Valadez  MRN: [id number]  YOB: 1945  Provider Performing Procedure: Madi Andino MD  Primary Care Physician: Reynaldo Morales MD      Pre-Procedure: Chest pain, possible coronary artery disease, abnormal stress test    Consent: I have discussed with the patient and/or the patient representative the indication, alternatives, and the possible risks and/or complications of the planned procedure and the anesthesia methods. The patient and/or representative appear to understand and agree to proceed. Medical History:   has a past medical history of Angina pectoris ([address] Blood circulation, collateral; CAD (coronary artery disease); Cancer ([address] Cancer ([address] Carotid artery disease ([address] Cerebral artery occlusion with cerebral infarction [facility]); CHF (congestive heart failure) ([address] CKD (chronic kidney disease), stage III; COPD (chronic obstructive pulmonary disease) ([address] Detached retina; Diabetes mellitus ([address] Diabetic nephropathy/sclerosis ([address] Diverticula of colon; GERD (gastroesophageal reflux disease); Hearing loss; History of blood transfusion; Hyperlipidemia; Hypertension; Hypothyroidism; Peripheral vascular disease ([address] Pneumonia; Prostate cancer ([address] Retinopathy due to secondary diabetes ([address] Tobacco abuse; Type II or unspecified type diabetes mellitus without mention of complication, not stated as uncontrolled; Vision blurred; Vitreous hemorrhage of right eye ([address] and Wears glasses. .    Surgical History:     has a past surgical history that includes Tonsillectomy (child); Leg Surgery (1999); Appendectomy (1967); Cholecystectomy (1990); hernia repair (1989); retinal laser (December 2013); retinal laser; malignant skin lesion excision (2011); Refractive surgery (Bilateral); vitrectomy (2/13/14); Cardiac surgery (1989, 1999);  Cardiac catheterization; other surgical history; vitrectomy (Right, 03/06/14); eye surgery (Feb and March 2014); Pacemaker insertion (2014); Cataract removal with implant (Bilateral, October 13, right; Oct 27, left eye); Colonoscopy (9/19/2006, 2015); vascular surgery; Endoscopy, colon, diagnostic; pacemaker placement; ostatectomy (07/27/2016); Coronary artery bypass graft (1989); Coronary artery bypass graft (1999); Coronary artery bypass graft (08/23/2016); and Upper gastrointestinal endoscopy (2017). Allergies: Allergies as of 01/24/2018 - Review Complete 01/24/2018   Allergen Reaction Noted    Tape Hilaria Adams tape] Other (See Comments) 08/01/2012       Medications:   Coumadin use last 5 days:  No  Antiplatelet drug therapy use last 5 days:  Yes  Other anticoagulant use last 5 days:  No   aspirin  81 mg Oral Daily    atorvastatin  80 mg Oral Daily    calcitRIOL  0.25 mcg Oral Daily    prasugrel  10 mg Oral Daily    fluticasone  1 spray Nasal Daily    insulin glargine  32 Units Subcutaneous Nightly    isosorbide mononitrate  120 mg Oral Daily    levothyroxine  50 mcg Oral Daily    Liraglutide  1.8 mg Subcutaneous Daily    losartan  50 mg Oral Daily    Metoprolol Tartrate  1 tablet Oral BID    MULTI-VITAMIN  1 tablet Oral Daily    sertraline  1 tablet Oral Daily    insulin lispro  0-18 Units Subcutaneous TID WC    insulin lispro  0-9 Units Subcutaneous Nightly     Prior to Admission medications    Medication Sig Start Date End Date Taking?  Authorizing Provider   sertraline (ZOLOFT) 100 MG tablet TAKE 1 TABLET DAILY 1/18/18   Juan Moreau CNP   sertraline (ZOLOFT) 100 MG tablet Take 1 tablet by mouth daily 1/18/18   Juan Moreau CNP   calcitRIOL (ROCALTROL) 0.25 MCG capsule Take 0.25 mcg by mouth daily    Historical Provider, MD   Metoprolol Tartrate 75 MG TABS TAKE 1 TABLET TWICE A DAY 12/20/17   Robert Suh MD   HUMALOG KWIKPEN 100 UNIT/ML pen INJECT PER SLIDING SCALE THREE TIMES A DAY BEFORE MEALS (MAX OF 80 UNITS DAILY) 12/18/17   Juan Moreau

## 2018-01-25 NOTE — DISCHARGE SUMMARY
[address] [place] [address]                                 DISCHARGE SUMMARY    PATIENT NAME: Renzo Ott                     :        1945  MED REC NO:   [id number]                           ROOM:       0028  ACCOUNT NO:   [de-identified]                           ADMIT DATE: 2018  PROVIDER:     Francisco Mantilla. Nasario Cockayne, M.D.               Isabellachina [place]: 2018      FINAL DIAGNOSES:  1. Crescendo angina pectoris. 2.  Atherosclerotic coronary artery disease of native coronary arteries. 3.  Atherosclerotic coronary artery disease of bypass graft. 4.  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 5.  The abnormal nuclear stress test.  6.  History of hypothyroidism. 7.  History of diabetes mellitus. 8.  Morbid obesity. 9.  Chronic renal failure, stage III to IV.  10.  Hypertensive cardiovascular disease. 11.  Dyslipidemia. 12.  Diabetes mellitus type 2. PROCEDURES PERFORMED DURING THIS HOSPITALIZATION:  1. Left heart cath, left ventriculogram, selective coronary angiogram  under IV conscious sedation. 2.  LIMA visualization. 3.  The saphenous vein graft visualization. HISTORY OF PRESENT ILLNESS:  This is a patient who is a 76-year-old nice  gentleman, known  history of coronary artery disease, had a history of CABG  twice in the past.  This patient has a history of prior intervention. The  patient has been complaining of angina pectoris, shortness of breath. He  did have a stress Cardiolite test which was abnormal.  He was treated  medically, continued to be symptomatic. This patient has a history of chronic renal insufficiency, was admitted to  the hospital initially for hydration. The patient did receive IV fluid and  Mucomyst.    The patient understands the procedure, the benefit, the risk, alternative  methods and possible complications and wants to be done.     HOSPITAL COURSE:  The

## 2018-01-25 NOTE — CARE COORDINATION
1/25/18, 2:16 PM    Discharge plan discussed by  and . Discharge plan reviewed with patient/ family. Patient/ family verbalize understanding of discharge plan and are in agreement with plan. Understanding was demonstrated using the teach back method. Spoke with pt and spouse this afternoon. Discharge order in place but pt states may be later tonight due to a med that he must have prior to discharge. Comes from home with no services or DME's. He has a PCP and transportation. He denies discharge needs.

## 2018-01-29 ENCOUNTER — CARE COORDINATION (OUTPATIENT)
Dept: CARE COORDINATION | Age: 73
End: 2018-01-29

## 2018-01-29 NOTE — CARE COORDINATION
1500 Calories Low Fat (Fat Restricted:)  Minimum Fat - 25 gm    Barriers: none  Plan for overcoming my barriers: N/A  Confidence: 7/10  Anticipated Goal Completion Date: ongoing            Prior to Admission medications    Medication Sig Start Date End Date Taking?  Authorizing Provider   Fluticasone Furoate-Vilanterol (BREO ELLIPTA IN) Inhale 1 puff into the lungs daily   Yes Historical Provider, MD   sertraline (ZOLOFT) 100 MG tablet TAKE 1 TABLET DAILY 1/18/18  Yes Maddy Gayle CNP   sertraline (ZOLOFT) 100 MG tablet Take 1 tablet by mouth daily 1/18/18  Yes Maddy Gayle CNP   calcitRIOL (ROCALTROL) 0.25 MCG capsule Take 0.25 mcg by mouth daily   Yes Historical Provider, MD   Metoprolol Tartrate 75 MG TABS TAKE 1 TABLET TWICE A DAY 12/20/17  Yes Teri Mcrgaw MD   HUMALOG KWIKPEN 100 UNIT/ML pen INJECT PER SLIDING SCALE THREE TIMES A DAY BEFORE MEALS (MAX OF 80 UNITS DAILY) 12/18/17  Yes Maddy Gayle CNP   atorvastatin (LIPITOR) 80 MG tablet TAKE 1 TABLET DAILY 12/12/17  Yes Krissy Clarke MD   insulin glargine (LANTUS SOLOSTAR) 100 UNIT/ML injection pen 34 units in am, 32 units in pm 10/20/17  Yes Teri Mcgraw MD   fluticasone (FLONASE) 50 MCG/ACT nasal spray 1 spray by Nasal route daily  Patient taking differently: 1 spray by Nasal route daily as needed  10/16/17  Yes Teri Mcgraw MD   levothyroxine (SYNTHROID) 50 MCG tablet TAKE 1 TABLET MONDAY THROUGH SATURDAY, THEN TAKE ONE AND ONE-HALF TABLETS ON CATRACHITO 10/13/17  Yes Teri Mcgraw MD   Liraglutide (VICTOZA) 18 MG/3ML SOPN SC injection INJECT 1.8 MG INTO THE SKIN DAILY 6/27/17  Yes Maddy Gayle CNP   bumetanide (BUMEX) 2 MG tablet Take 1 tablet by mouth daily 5/1/17  Yes Historical Provider, MD   isosorbide mononitrate (IMDUR) 120 MG extended release tablet TAKE 1 TABLET EVERY MORNING 2/14/17  Yes Krissy Clarke MD   EFFIENT 10 MG TABS Take 1 tablet by mouth daily 11/11/16  Yes Historical Provider, MD   losartan (COZAAR) 50 MG tablet

## 2018-02-06 DIAGNOSIS — E11.65 TYPE 2 DIABETES MELLITUS WITH HYPERGLYCEMIA, UNSPECIFIED LONG TERM INSULIN USE STATUS: ICD-10-CM

## 2018-02-19 ENCOUNTER — CARE COORDINATION (OUTPATIENT)
Dept: CARE COORDINATION | Age: 73
End: 2018-02-19

## 2018-02-19 ASSESSMENT — ENCOUNTER SYMPTOMS: DYSPNEA ASSOCIATED WITH: EXERTION

## 2018-02-19 NOTE — CARE COORDINATION
needed for Wheezing 8/22/16  Yes Nely Villalobos PA-C   glucose blood VI test strips (ACCU-CHEK TERESA PLUS) strip Check BS 4 times a day 4/19/16  Yes Camilo Davey MD   aspirin 81 MG chewable tablet Take 1 tablet by mouth daily 4/17/16  Yes Christine Cox MD   B-D ULTRAFINE III SHORT PEN 31G X 8 MM MISC USE TO INJECT INSULIN UNDER THE SKIN FOUR TIMES A DAY 8/20/15  Yes ZHANNA Lino   Acetaminophen 650 MG TABS Take 650 mg by mouth every 4 hours as needed 6/1/15  Yes Autumn Cotto MD   Cholecalciferol (VITAMIN D3) 2000 UNITS CAPS   Take 1 capsule by mouth daily    Yes Historical Provider, MD   glucagon 1 MG injection Inject 1 mg into the skin See Admin Instructions. Follow package directions for low blood sugar.  10/11/12  Yes Camilo Davey MD   Multiple Vitamin (MULTI-VITAMIN) TABS   Take 1 tablet by mouth daily    Yes Historical Provider, MD       Future Appointments  Date Time Provider Dean Tatiana   2/26/2018 11:00 AM [facility]   3/15/2018 3:20 PM [facility] [place]   6/6/2018 1:00 PM [facility] [place]   7/11/2018 10:00 AM Prashant [facility] [address] [facility] [place]

## 2018-02-23 ENCOUNTER — TELEPHONE (OUTPATIENT)
Dept: FAMILY MEDICINE CLINIC | Age: 73
End: 2018-02-23

## 2018-02-26 ENCOUNTER — HOSPITAL ENCOUNTER (OUTPATIENT)
Dept: PULMONOLOGY | Age: 73
Discharge: HOME | End: 2018-02-26

## 2018-03-02 ENCOUNTER — HOSPITAL ENCOUNTER (OUTPATIENT)
Dept: PULMONOLOGY | Age: 73
Discharge: HOME | End: 2018-03-02
Payer: MEDICARE

## 2018-03-02 DIAGNOSIS — J44.9 CHRONIC OBSTRUCTIVE PULMONARY DISEASE, UNSPECIFIED COPD TYPE (HCC): ICD-10-CM

## 2018-03-02 PROCEDURE — 94060 EVALUATION OF WHEEZING: CPT

## 2018-03-19 ENCOUNTER — CARE COORDINATION (OUTPATIENT)
Dept: CARE COORDINATION | Age: 73
End: 2018-03-19

## 2018-03-19 ASSESSMENT — ENCOUNTER SYMPTOMS: DYSPNEA ASSOCIATED WITH: EXERTION

## 2018-03-19 NOTE — CARE COORDINATION
mouth daily   Yes Historical Provider, MD   Metoprolol Tartrate 75 MG TABS TAKE 1 TABLET TWICE A DAY 12/20/17  Yes Harpreet Montero MD   HUMALOG KWIKPEN 100 UNIT/ML pen INJECT PER SLIDING SCALE THREE TIMES A DAY BEFORE MEALS (MAX OF 80 UNITS DAILY) 12/18/17  Yes Marta Estrella CNP   atorvastatin (LIPITOR) 80 MG tablet TAKE 1 TABLET DAILY 12/12/17  Yes Antonina Krause MD   fluticasone [place]) 50 MCG/ACT nasal spray 1 spray by Nasal route daily  Patient taking differently: 1 spray by Nasal route daily as needed  10/16/17  Yes Harpreet Montero MD   levothyroxine (SYNTHROID) 50 MCG tablet TAKE 1 TABLET MONDAY THROUGH SATURDAY, THEN TAKE ONE AND ONE-HALF TABLETS ON CATRACHITO 10/13/17  Yes Harpreet Montreo MD   Liraglutide (VICTOZA) 18 MG/3ML SOPN SC injection INJECT 1.8 MG INTO THE SKIN DAILY 6/27/17  Yes Marta Estrella CNP   bumetanide (BUMEX) 2 MG tablet Take 1 tablet by mouth daily 5/1/17  Yes Historical Provider, MD   isosorbide mononitrate (IMDUR) 120 MG extended release tablet TAKE 1 TABLET EVERY MORNING 2/14/17  Yes Antonina Krause MD   EFFIENT 10 MG TABS Take 1 tablet by mouth daily 11/11/16  Yes Historical Provider, MD   losartan (COZAAR) 50 MG tablet Take 1 tablet by mouth daily 9/19/16  Yes Harpreet Montero MD   metroNIDAZOLE (METROGEL) 0.75 % gel Apply topically 2 times daily. 9/19/16  Yes Harpreet Montero MD   nitroGLYCERIN (NITROSTAT) 0.4 MG SL tablet Take 1 tablet for chest pain and repeat after 5 min if no relief, call 911 and take another dose 5 min later. Max of 3 doses in 15 min.  9/3/16  Yes Matty Roberts MD   CPAP Machine MISC by Does not apply route Please change CPAP pressure to 14 cm H20. 8/22/16  Yes Adele Dennis PA-C   albuterol sulfate HFA (PROVENTIL HFA) 108 (90 BASE) MCG/ACT inhaler Inhale 2 puffs into the lungs every 6 hours as needed for Wheezing 8/22/16  Yes Adele Dennis PA-C   glucose blood VI test strips (ACCU-CHEK TERESA PLUS) strip Check BS 4 times a day 4/19/16  Yes Mike Rea MD

## 2018-03-21 ENCOUNTER — HOSPITAL ENCOUNTER (OUTPATIENT)
Age: 73
Discharge: HOME | End: 2018-03-23
Attending: INTERNAL MEDICINE | Admitting: INTERNAL MEDICINE
Payer: MEDICARE

## 2018-03-21 PROBLEM — I25.10 CAD IN NATIVE ARTERY: Status: ACTIVE | Noted: 2018-03-21

## 2018-03-21 LAB
ANION GAP SERPL CALCULATED.3IONS-SCNC: 13 MEQ/L (ref 8–16)
BUN BLDV-MCNC: 28 MG/DL (ref 7–22)
CALCIUM SERPL-MCNC: 9.2 MG/DL (ref 8.5–10.5)
CHLORIDE BLD-SCNC: 106 MEQ/L (ref 98–111)
CO2: 23 MEQ/L (ref 23–33)
CREAT SERPL-MCNC: 2 MG/DL (ref 0.4–1.2)
GFR SERPL CREATININE-BSD FRML MDRD: 33 ML/MIN/1.73M2
GLUCOSE BLD-MCNC: 156 MG/DL (ref 70–108)
GLUCOSE BLD-MCNC: 159 MG/DL (ref 70–108)
GLUCOSE BLD-MCNC: 164 MG/DL (ref 70–108)
HCT VFR BLD CALC: 40.8 % (ref 42–52)
HEMOGLOBIN: 13.7 GM/DL (ref 14–18)
MCH RBC QN AUTO: 30.9 PG (ref 27–31)
MCHC RBC AUTO-ENTMCNC: 33.7 GM/DL (ref 33–37)
MCV RBC AUTO: 91.8 FL (ref 80–94)
PDW BLD-RTO: 14.9 % (ref 11.5–14.5)
PLATELET # BLD: 235 THOU/MM3 (ref 130–400)
PMV BLD AUTO: 9 FL (ref 7.4–10.4)
POTASSIUM SERPL-SCNC: 4.6 MEQ/L (ref 3.5–5.2)
RBC # BLD: 4.45 MILL/MM3 (ref 4.7–6.1)
SODIUM BLD-SCNC: 142 MEQ/L (ref 135–145)
WBC # BLD: 7 THOU/MM3 (ref 4.8–10.8)

## 2018-03-21 PROCEDURE — 2580000003 HC RX 258: Performed by: INTERNAL MEDICINE

## 2018-03-21 PROCEDURE — 6370000000 HC RX 637 (ALT 250 FOR IP): Performed by: INTERNAL MEDICINE

## 2018-03-21 PROCEDURE — 80048 BASIC METABOLIC PNL TOTAL CA: CPT

## 2018-03-21 PROCEDURE — 82948 REAGENT STRIP/BLOOD GLUCOSE: CPT

## 2018-03-21 PROCEDURE — 85027 COMPLETE CBC AUTOMATED: CPT

## 2018-03-21 PROCEDURE — 36415 COLL VENOUS BLD VENIPUNCTURE: CPT

## 2018-03-21 RX ORDER — NICOTINE POLACRILEX 4 MG
15 LOZENGE BUCCAL PRN
Status: DISCONTINUED | OUTPATIENT
Start: 2018-03-21 | End: 2018-03-23 | Stop reason: HOSPADM

## 2018-03-21 RX ORDER — DEXTROSE MONOHYDRATE 25 G/50ML
12.5 INJECTION, SOLUTION INTRAVENOUS PRN
Status: DISCONTINUED | OUTPATIENT
Start: 2018-03-21 | End: 2018-03-23 | Stop reason: HOSPADM

## 2018-03-21 RX ORDER — ATORVASTATIN CALCIUM 80 MG/1
80 TABLET, FILM COATED ORAL NIGHTLY
Status: DISCONTINUED | OUTPATIENT
Start: 2018-03-21 | End: 2018-03-23 | Stop reason: HOSPADM

## 2018-03-21 RX ORDER — BUMETANIDE 1 MG/1
2 TABLET ORAL DAILY
Status: DISCONTINUED | OUTPATIENT
Start: 2018-03-21 | End: 2018-03-21

## 2018-03-21 RX ORDER — ASPIRIN 81 MG/1
81 TABLET ORAL DAILY
COMMUNITY

## 2018-03-21 RX ORDER — LOSARTAN POTASSIUM 50 MG/1
50 TABLET ORAL DAILY
Status: DISCONTINUED | OUTPATIENT
Start: 2018-03-21 | End: 2018-03-21

## 2018-03-21 RX ORDER — ASPIRIN 81 MG/1
81 TABLET, CHEWABLE ORAL DAILY
Status: DISCONTINUED | OUTPATIENT
Start: 2018-03-21 | End: 2018-03-23 | Stop reason: SDUPTHER

## 2018-03-21 RX ORDER — MULTIVITAMIN WITH FOLIC ACID 400 MCG
1 TABLET ORAL DAILY
Status: DISCONTINUED | OUTPATIENT
Start: 2018-03-21 | End: 2018-03-23 | Stop reason: HOSPADM

## 2018-03-21 RX ORDER — FLUTICASONE PROPIONATE 50 MCG
1 SPRAY, SUSPENSION (ML) NASAL DAILY
Status: DISCONTINUED | OUTPATIENT
Start: 2018-03-21 | End: 2018-03-23 | Stop reason: HOSPADM

## 2018-03-21 RX ORDER — LEVOTHYROXINE SODIUM 0.05 MG/1
75 TABLET ORAL WEEKLY
Status: DISCONTINUED | OUTPATIENT
Start: 2018-03-25 | End: 2018-03-23 | Stop reason: HOSPADM

## 2018-03-21 RX ORDER — METRONIDAZOLE 7.5 MG/G
GEL TOPICAL 2 TIMES DAILY
Status: DISCONTINUED | OUTPATIENT
Start: 2018-03-21 | End: 2018-03-23 | Stop reason: HOSPADM

## 2018-03-21 RX ORDER — LEVOTHYROXINE SODIUM 0.05 MG/1
50 TABLET ORAL
Status: DISCONTINUED | OUTPATIENT
Start: 2018-03-22 | End: 2018-03-23 | Stop reason: HOSPADM

## 2018-03-21 RX ORDER — FLUTICASONE FUROATE AND VILANTEROL 200; 25 UG/1; UG/1
1 POWDER RESPIRATORY (INHALATION) DAILY
Status: DISCONTINUED | OUTPATIENT
Start: 2018-03-22 | End: 2018-03-23 | Stop reason: HOSPADM

## 2018-03-21 RX ORDER — ISOSORBIDE MONONITRATE 60 MG/1
120 TABLET, EXTENDED RELEASE ORAL DAILY
Status: DISCONTINUED | OUTPATIENT
Start: 2018-03-21 | End: 2018-03-23 | Stop reason: HOSPADM

## 2018-03-21 RX ORDER — ACETAMINOPHEN 325 MG/1
650 TABLET ORAL EVERY 4 HOURS PRN
Status: DISCONTINUED | OUTPATIENT
Start: 2018-03-21 | End: 2018-03-22 | Stop reason: SDUPTHER

## 2018-03-21 RX ORDER — CALCITRIOL 0.25 UG/1
0.25 CAPSULE, LIQUID FILLED ORAL DAILY
Status: DISCONTINUED | OUTPATIENT
Start: 2018-03-21 | End: 2018-03-23 | Stop reason: HOSPADM

## 2018-03-21 RX ORDER — ALBUTEROL SULFATE 90 UG/1
2 AEROSOL, METERED RESPIRATORY (INHALATION) EVERY 6 HOURS PRN
Status: DISCONTINUED | OUTPATIENT
Start: 2018-03-21 | End: 2018-03-23 | Stop reason: HOSPADM

## 2018-03-21 RX ORDER — ACETYLCYSTEINE 200 MG/ML
600 SOLUTION ORAL; RESPIRATORY (INHALATION) 2 TIMES DAILY
Status: DISCONTINUED | OUTPATIENT
Start: 2018-03-21 | End: 2018-03-21 | Stop reason: ALTCHOICE

## 2018-03-21 RX ORDER — SERTRALINE HYDROCHLORIDE 100 MG/1
100 TABLET, FILM COATED ORAL DAILY
Status: DISCONTINUED | OUTPATIENT
Start: 2018-03-21 | End: 2018-03-23 | Stop reason: HOSPADM

## 2018-03-21 RX ORDER — METOPROLOL TARTRATE 75 MG/1
TABLET, FILM COATED ORAL
Qty: 180 TABLET | Refills: 0 | Status: ON HOLD | OUTPATIENT
Start: 2018-03-21 | End: 2018-05-23

## 2018-03-21 RX ORDER — SODIUM CHLORIDE 9 MG/ML
INJECTION, SOLUTION INTRAVENOUS CONTINUOUS
Status: DISCONTINUED | OUTPATIENT
Start: 2018-03-21 | End: 2018-03-22 | Stop reason: SDUPTHER

## 2018-03-21 RX ORDER — DEXTROSE MONOHYDRATE 50 MG/ML
100 INJECTION, SOLUTION INTRAVENOUS PRN
Status: DISCONTINUED | OUTPATIENT
Start: 2018-03-21 | End: 2018-03-23 | Stop reason: HOSPADM

## 2018-03-21 RX ORDER — INSULIN GLARGINE 100 [IU]/ML
34 INJECTION, SOLUTION SUBCUTANEOUS EVERY MORNING
Status: DISCONTINUED | OUTPATIENT
Start: 2018-03-22 | End: 2018-03-23 | Stop reason: HOSPADM

## 2018-03-21 RX ORDER — INSULIN GLARGINE 100 [IU]/ML
32 INJECTION, SOLUTION SUBCUTANEOUS NIGHTLY
Status: DISCONTINUED | OUTPATIENT
Start: 2018-03-21 | End: 2018-03-23 | Stop reason: HOSPADM

## 2018-03-21 RX ORDER — ACETYLCYSTEINE 200 MG/ML
600 SOLUTION ORAL; RESPIRATORY (INHALATION) 2 TIMES DAILY
Status: DISCONTINUED | OUTPATIENT
Start: 2018-03-21 | End: 2018-03-21 | Stop reason: CLARIF

## 2018-03-21 RX ORDER — PRASUGREL 10 MG/1
10 TABLET, FILM COATED ORAL DAILY
Status: DISCONTINUED | OUTPATIENT
Start: 2018-03-21 | End: 2018-03-23 | Stop reason: HOSPADM

## 2018-03-21 RX ADMIN — ATORVASTATIN CALCIUM 80 MG: 80 TABLET, FILM COATED ORAL at 20:46

## 2018-03-21 RX ADMIN — INSULIN GLARGINE 32 UNITS: 100 INJECTION, SOLUTION SUBCUTANEOUS at 22:12

## 2018-03-21 RX ADMIN — METOPROLOL TARTRATE 75 MG: 25 TABLET, FILM COATED ORAL at 20:46

## 2018-03-21 RX ADMIN — ACETYLCYSTEINE 500 MG: 500 TABLET, EFFERVESCENT ORAL at 20:48

## 2018-03-21 RX ADMIN — SODIUM CHLORIDE: 9 INJECTION, SOLUTION INTRAVENOUS at 20:36

## 2018-03-21 ASSESSMENT — PAIN SCALES - GENERAL: PAINLEVEL_OUTOF10: 0

## 2018-03-22 ENCOUNTER — APPOINTMENT (OUTPATIENT)
Dept: CARDIAC CATH/INVASIVE PROCEDURES | Age: 73
End: 2018-03-22
Attending: INTERNAL MEDICINE
Payer: MEDICARE

## 2018-03-22 LAB
ABO: NORMAL
ANION GAP SERPL CALCULATED.3IONS-SCNC: 15 MEQ/L (ref 8–16)
BUN BLDV-MCNC: 27 MG/DL (ref 7–22)
CALCIUM SERPL-MCNC: 8.7 MG/DL (ref 8.5–10.5)
CHLORIDE BLD-SCNC: 105 MEQ/L (ref 98–111)
CHOLESTEROL, TOTAL: 101 MG/DL (ref 100–199)
CO2: 20 MEQ/L (ref 23–33)
CREAT SERPL-MCNC: 1.9 MG/DL (ref 0.4–1.2)
EKG ATRIAL RATE: 250 BPM
EKG Q-T INTERVAL: 536 MS
EKG QRS DURATION: 186 MS
EKG QTC CALCULATION (BAZETT): 536 MS
EKG R AXIS: -80 DEGREES
EKG T AXIS: 93 DEGREES
EKG VENTRICULAR RATE: 60 BPM
GEL INDIRECT COOMBS: NORMAL
GFR SERPL CREATININE-BSD FRML MDRD: 35 ML/MIN/1.73M2
GLUCOSE BLD-MCNC: 109 MG/DL (ref 70–108)
GLUCOSE BLD-MCNC: 110 MG/DL (ref 70–108)
GLUCOSE BLD-MCNC: 66 MG/DL (ref 70–108)
GLUCOSE BLD-MCNC: 77 MG/DL (ref 70–108)
GLUCOSE BLD-MCNC: 85 MG/DL (ref 70–108)
GLUCOSE BLD-MCNC: 90 MG/DL (ref 70–108)
GLUCOSE BLD-MCNC: 99 MG/DL (ref 70–108)
HCT VFR BLD CALC: 37.6 % (ref 42–52)
HDLC SERPL-MCNC: 34 MG/DL
HEMOGLOBIN: 12.6 GM/DL (ref 14–18)
INR BLD: 1.12 (ref 0.85–1.13)
LDL CHOLESTEROL CALCULATED: 50 MG/DL
MCH RBC QN AUTO: 30.8 PG (ref 27–31)
MCHC RBC AUTO-ENTMCNC: 33.6 GM/DL (ref 33–37)
MCV RBC AUTO: 91.7 FL (ref 80–94)
PDW BLD-RTO: 14.7 % (ref 11.5–14.5)
PLATELET # BLD: 201 THOU/MM3 (ref 130–400)
PMV BLD AUTO: 9.1 FL (ref 7.4–10.4)
POTASSIUM SERPL-SCNC: 3.7 MEQ/L (ref 3.5–5.2)
RBC # BLD: 4.1 MILL/MM3 (ref 4.7–6.1)
RH FACTOR: NORMAL
SODIUM BLD-SCNC: 140 MEQ/L (ref 135–145)
TRIGL SERPL-MCNC: 86 MG/DL (ref 0–199)
WBC # BLD: 5.3 THOU/MM3 (ref 4.8–10.8)

## 2018-03-22 PROCEDURE — 85610 PROTHROMBIN TIME: CPT

## 2018-03-22 PROCEDURE — 86900 BLOOD TYPING SEROLOGIC ABO: CPT

## 2018-03-22 PROCEDURE — C1773 RET DEV, INSERTABLE: HCPCS

## 2018-03-22 PROCEDURE — 6360000002 HC RX W HCPCS

## 2018-03-22 PROCEDURE — 93005 ELECTROCARDIOGRAM TRACING: CPT | Performed by: INTERNAL MEDICINE

## 2018-03-22 PROCEDURE — C1725 CATH, TRANSLUMIN NON-LASER: HCPCS

## 2018-03-22 PROCEDURE — 6370000000 HC RX 637 (ALT 250 FOR IP): Performed by: INTERNAL MEDICINE

## 2018-03-22 PROCEDURE — 2580000003 HC RX 258: Performed by: INTERNAL MEDICINE

## 2018-03-22 PROCEDURE — 2500000003 HC RX 250 WO HCPCS

## 2018-03-22 PROCEDURE — 80048 BASIC METABOLIC PNL TOTAL CA: CPT

## 2018-03-22 PROCEDURE — C1894 INTRO/SHEATH, NON-LASER: HCPCS

## 2018-03-22 PROCEDURE — C1760 CLOSURE DEV, VASC: HCPCS

## 2018-03-22 PROCEDURE — 86901 BLOOD TYPING SEROLOGIC RH(D): CPT

## 2018-03-22 PROCEDURE — 93010 ELECTROCARDIOGRAM REPORT: CPT | Performed by: NUCLEAR MEDICINE

## 2018-03-22 PROCEDURE — 2720000010 HC SURG SUPPLY STERILE

## 2018-03-22 PROCEDURE — 36415 COLL VENOUS BLD VENIPUNCTURE: CPT

## 2018-03-22 PROCEDURE — 86885 COOMBS TEST INDIRECT QUAL: CPT

## 2018-03-22 PROCEDURE — 85027 COMPLETE CBC AUTOMATED: CPT

## 2018-03-22 PROCEDURE — 92928 PRQ TCAT PLMT NTRAC ST 1 LES: CPT | Performed by: INTERNAL MEDICINE

## 2018-03-22 PROCEDURE — 80061 LIPID PANEL: CPT

## 2018-03-22 PROCEDURE — C1887 CATHETER, GUIDING: HCPCS

## 2018-03-22 PROCEDURE — C1874 STENT, COATED/COV W/DEL SYS: HCPCS

## 2018-03-22 PROCEDURE — 82948 REAGENT STRIP/BLOOD GLUCOSE: CPT

## 2018-03-22 PROCEDURE — C1769 GUIDE WIRE: HCPCS

## 2018-03-22 RX ORDER — DIPHENHYDRAMINE HCL 25 MG
50 TABLET ORAL ONCE
Status: DISCONTINUED | OUTPATIENT
Start: 2018-03-22 | End: 2018-03-23 | Stop reason: HOSPADM

## 2018-03-22 RX ORDER — ASPIRIN 325 MG
325 TABLET ORAL ONCE
Status: DISCONTINUED | OUTPATIENT
Start: 2018-03-22 | End: 2018-03-23 | Stop reason: HOSPADM

## 2018-03-22 RX ORDER — ONDANSETRON 4 MG/1
4 TABLET, ORALLY DISINTEGRATING ORAL EVERY 6 HOURS PRN
Status: DISCONTINUED | OUTPATIENT
Start: 2018-03-22 | End: 2018-03-23 | Stop reason: HOSPADM

## 2018-03-22 RX ORDER — SODIUM CHLORIDE 0.9 % (FLUSH) 0.9 %
10 SYRINGE (ML) INJECTION PRN
Status: DISCONTINUED | OUTPATIENT
Start: 2018-03-22 | End: 2018-03-23 | Stop reason: HOSPADM

## 2018-03-22 RX ORDER — SODIUM CHLORIDE 9 MG/ML
INJECTION, SOLUTION INTRAVENOUS CONTINUOUS
Status: ACTIVE | OUTPATIENT
Start: 2018-03-22 | End: 2018-03-23

## 2018-03-22 RX ORDER — ACETAMINOPHEN 325 MG/1
650 TABLET ORAL EVERY 4 HOURS PRN
Status: DISCONTINUED | OUTPATIENT
Start: 2018-03-22 | End: 2018-03-23 | Stop reason: HOSPADM

## 2018-03-22 RX ORDER — SODIUM CHLORIDE 0.9 % (FLUSH) 0.9 %
10 SYRINGE (ML) INJECTION EVERY 12 HOURS SCHEDULED
Status: DISCONTINUED | OUTPATIENT
Start: 2018-03-22 | End: 2018-03-23 | Stop reason: HOSPADM

## 2018-03-22 RX ORDER — SODIUM CHLORIDE 0.9 % (FLUSH) 0.9 %
10 SYRINGE (ML) INJECTION EVERY 12 HOURS SCHEDULED
Status: DISCONTINUED | OUTPATIENT
Start: 2018-03-22 | End: 2018-03-22 | Stop reason: SDUPTHER

## 2018-03-22 RX ORDER — SODIUM CHLORIDE 0.9 % (FLUSH) 0.9 %
10 SYRINGE (ML) INJECTION PRN
Status: DISCONTINUED | OUTPATIENT
Start: 2018-03-22 | End: 2018-03-22 | Stop reason: SDUPTHER

## 2018-03-22 RX ORDER — DEXTROSE MONOHYDRATE 50 MG/ML
INJECTION, SOLUTION INTRAVENOUS CONTINUOUS
Status: DISCONTINUED | OUTPATIENT
Start: 2018-03-22 | End: 2018-03-23 | Stop reason: HOSPADM

## 2018-03-22 RX ORDER — ONDANSETRON 2 MG/ML
4 INJECTION INTRAMUSCULAR; INTRAVENOUS EVERY 6 HOURS PRN
Status: DISCONTINUED | OUTPATIENT
Start: 2018-03-22 | End: 2018-03-22 | Stop reason: SDUPTHER

## 2018-03-22 RX ORDER — ATROPINE SULFATE 0.4 MG/ML
0.5 AMPUL (ML) INJECTION
Status: ACTIVE | OUTPATIENT
Start: 2018-03-22 | End: 2018-03-22

## 2018-03-22 RX ORDER — ASPIRIN 325 MG
325 TABLET ORAL DAILY
Status: DISCONTINUED | OUTPATIENT
Start: 2018-03-23 | End: 2018-03-23 | Stop reason: HOSPADM

## 2018-03-22 RX ORDER — DIPHENHYDRAMINE HCL 25 MG
25 TABLET ORAL
Status: ACTIVE | OUTPATIENT
Start: 2018-03-22 | End: 2018-03-22

## 2018-03-22 RX ORDER — SODIUM CHLORIDE 9 MG/ML
INJECTION, SOLUTION INTRAVENOUS CONTINUOUS
Status: DISCONTINUED | OUTPATIENT
Start: 2018-03-22 | End: 2018-03-22

## 2018-03-22 RX ORDER — NITROGLYCERIN 0.4 MG/1
0.4 TABLET SUBLINGUAL EVERY 5 MIN PRN
Status: DISCONTINUED | OUTPATIENT
Start: 2018-03-22 | End: 2018-03-23 | Stop reason: HOSPADM

## 2018-03-22 RX ORDER — ALPRAZOLAM 0.5 MG/1
0.5 TABLET ORAL
Status: ACTIVE | OUTPATIENT
Start: 2018-03-22 | End: 2018-03-22

## 2018-03-22 RX ADMIN — VITAMIN D, TAB 1000IU (100/BT) 2000 UNITS: 25 TAB at 08:15

## 2018-03-22 RX ADMIN — DEXTROSE MONOHYDRATE 12.5 G: 500 INJECTION PARENTERAL at 15:13

## 2018-03-22 RX ADMIN — SERTRALINE 100 MG: 100 TABLET, FILM COATED ORAL at 08:15

## 2018-03-22 RX ADMIN — PRASUGREL HYDROCHLORIDE 10 MG: 10 TABLET, FILM COATED ORAL at 08:14

## 2018-03-22 RX ADMIN — LEVOTHYROXINE SODIUM 50 MCG: 50 TABLET ORAL at 06:22

## 2018-03-22 RX ADMIN — THERA TABS 1 TABLET: TAB at 08:13

## 2018-03-22 RX ADMIN — ACETYLCYSTEINE 500 MG: 500 TABLET, EFFERVESCENT ORAL at 08:07

## 2018-03-22 RX ADMIN — DEXTROSE MONOHYDRATE: 50 INJECTION, SOLUTION INTRAVENOUS at 17:01

## 2018-03-22 RX ADMIN — SODIUM CHLORIDE: 9 INJECTION, SOLUTION INTRAVENOUS at 08:07

## 2018-03-22 RX ADMIN — INSULIN GLARGINE 34 UNITS: 100 INJECTION, SOLUTION SUBCUTANEOUS at 08:13

## 2018-03-22 RX ADMIN — METOPROLOL TARTRATE 75 MG: 25 TABLET, FILM COATED ORAL at 08:11

## 2018-03-22 RX ADMIN — ISOSORBIDE MONONITRATE 120 MG: 60 TABLET, EXTENDED RELEASE ORAL at 08:11

## 2018-03-22 RX ADMIN — ASPIRIN 81 MG: 81 TABLET, CHEWABLE ORAL at 08:09

## 2018-03-22 ASSESSMENT — PAIN SCALES - GENERAL: PAINLEVEL_OUTOF10: 0

## 2018-03-22 NOTE — PLAN OF CARE
Problem: Tissue Perfusion - Cardiopulmonary, Altered:  Goal: Absence of angina  Absence of angina   Outcome: Ongoing  Denies chest pain. Heart cath today. Problem: Discharge Planning:  Goal: Discharged to appropriate level of care  Discharged to appropriate level of care   Outcome: Ongoing  Educated on potential discharge needs. Plan to be discharged to home. Problem: Falls - Risk of:  Goal: Will remain free from falls  Will remain free from falls   Outcome: Ongoing  No falls throughout shift. Gait steady. Comments: Care plan reviewed with patient. Patient verbalize understanding of the plan of care and contribute to goal setting.

## 2018-03-23 ENCOUNTER — TELEPHONE (OUTPATIENT)
Dept: FAMILY MEDICINE CLINIC | Age: 73
End: 2018-03-23

## 2018-03-23 VITALS
HEART RATE: 79 BPM | OXYGEN SATURATION: 99 % | SYSTOLIC BLOOD PRESSURE: 153 MMHG | HEIGHT: 72 IN | BODY MASS INDEX: 35.97 KG/M2 | RESPIRATION RATE: 20 BRPM | TEMPERATURE: 97.7 F | DIASTOLIC BLOOD PRESSURE: 72 MMHG | WEIGHT: 265.6 LBS

## 2018-03-23 LAB
GLUCOSE BLD-MCNC: 149 MG/DL (ref 70–108)
GLUCOSE BLD-MCNC: 90 MG/DL (ref 70–108)

## 2018-03-23 PROCEDURE — 6370000000 HC RX 637 (ALT 250 FOR IP): Performed by: INTERNAL MEDICINE

## 2018-03-23 PROCEDURE — 2580000003 HC RX 258: Performed by: INTERNAL MEDICINE

## 2018-03-23 PROCEDURE — 2700000000 HC OXYGEN THERAPY PER DAY

## 2018-03-23 PROCEDURE — 82948 REAGENT STRIP/BLOOD GLUCOSE: CPT

## 2018-03-23 RX ADMIN — INSULIN GLARGINE 34 UNITS: 100 INJECTION, SOLUTION SUBCUTANEOUS at 10:30

## 2018-03-23 RX ADMIN — METOPROLOL TARTRATE 75 MG: 25 TABLET, FILM COATED ORAL at 03:00

## 2018-03-23 RX ADMIN — ISOSORBIDE MONONITRATE 120 MG: 60 TABLET, EXTENDED RELEASE ORAL at 08:33

## 2018-03-23 RX ADMIN — PRASUGREL HYDROCHLORIDE 10 MG: 10 TABLET, FILM COATED ORAL at 08:29

## 2018-03-23 RX ADMIN — ACETYLCYSTEINE 500 MG: 500 TABLET, EFFERVESCENT ORAL at 10:34

## 2018-03-23 RX ADMIN — THERA TABS 1 TABLET: TAB at 08:28

## 2018-03-23 RX ADMIN — LEVOTHYROXINE SODIUM 50 MCG: 50 TABLET ORAL at 03:38

## 2018-03-23 RX ADMIN — ASPIRIN 325 MG: 325 TABLET, COATED ORAL at 08:30

## 2018-03-23 RX ADMIN — SERTRALINE 100 MG: 100 TABLET, FILM COATED ORAL at 08:29

## 2018-03-23 RX ADMIN — SODIUM CHLORIDE: 9 INJECTION, SOLUTION INTRAVENOUS at 00:00

## 2018-03-23 RX ADMIN — Medication 10 UNITS: at 13:54

## 2018-03-23 ASSESSMENT — PAIN SCALES - GENERAL: PAINLEVEL_OUTOF10: 0

## 2018-03-23 NOTE — PROGRESS NOTES
Discharge instructions given to patient who verbalizes understanding with teach back method. Stent card given to patient. Patient received pamphlet about cardiac intervention, how to take care of the incision site, mended hearts program, cardiac rehab and the hours of operations, and Nutritional information regarding cardiac diet. Patient denies further questions or concerns after teaching completed.

## 2018-03-23 NOTE — TELEPHONE ENCOUNTER
.Transition of Care visit scheduled.   3/26/2018  Patient is being discharged to  [facility] f/u within 5 days CHF Cath Stents  DC [facility] to home 3/23/18 Misaeler score 24.75

## 2018-03-24 ENCOUNTER — CARE COORDINATION (OUTPATIENT)
Dept: CASE MANAGEMENT | Age: 73
End: 2018-03-24

## 2018-03-24 NOTE — PROCEDURES
EKG was handed to [facility].
showed patent right iliac artery  with distal runoff and successful deployment of the Angio-Seal closure  device. During the procedure, the patient did receive Angiomax. He Has already  been on dual antiplatelet treatments. The successful deployment of the  Angio-Seal closure device was then performed. IMPRESSION:  1. Successful complex angioplasty stent distal mid proximal circumflex  artery utilizing multiple PROMUS drug-eluting stent reducing the stenosis  to 0%. 2.  Successful angioplasty stent deployment of diagonal artery reducing the  stenosis to 0%. 3.  Successful deployment of the Angio-Seal closure device. RECOMMENDATIONS:  Continue aggressive medical treatment, risk factor  modification. Care followup. The patient will be seen in the office to be  involved in a cardiac rehab. Elana Zimmerman M.D.    D: 03/23/2018 15:23:01       T: 03/23/2018 15:27:27     [email or web address]  Job#: [id number]     Doc#: [id number]    CC:  Maria Dolores Jones M.D.

## 2018-03-24 NOTE — DISCHARGE SUMMARY
[address] [place] [address]                                 DISCHARGE SUMMARY    PATIENT NAME: Breanne Gibson                     :        1945  MED REC NO:   [id number]                           ROOM:       0024  ACCOUNT NO:   [de-identified]                           ADMIT DATE: 2018  PROVIDER:     Rosa Isela Raygoza. Shanda Padilla M.D.               Media Setters: 2018        FINAL DIAGNOSES:  1. Crescendo angina pectoris class III. 2.  Atherosclerotic coronary artery disease of native coronary arteries. 3.  History of coronary artery disease, history of CABG. 4.  Diabetes mellitus, insulin independent. 5.  History of diabetic retinopathy. 6.  Morbid obesity. 7.  History of CA of the prostate. 8.  History of chronic renal insufficiency. 9.  Abnormal nuclear stress test.    PROCEDURES PERFORMED IN THIS HOSPITALIZATION:  1. Angioplasty stent of the circumflex artery. 2.  Angioplasty stent of diagonal artery. 3.  Procedure performed under IV sedation. BRIEF HISTORY:  This is a patient, a 79-year-old gentleman, history of  coronary artery disease. The patient has been complaining of recurrence of  shortness of breath, chest pain. The patient did have a stress test that  was abnormal, heart catheterization was abnormal, treated medically,  continued to be symptomatic. The patient had a history of renal  insufficiency, admitted the day before for hydration and intervention. The  patient understands the procedure, the benefits, the risks, alternative  methods of treatment, and possible complication, wants to be done. HOSPITAL COURSE:  The patient admitted for hydration. He did receive IV  fluid. Did receive Mucomyst.  The patient tolerated the procedure, and he  was taken to the cath lab where he underwent intervention of the diagonal  artery and the circumflex. Difficult procedures. The patient tolerated  the procedure.

## 2018-03-26 ENCOUNTER — OFFICE VISIT (OUTPATIENT)
Dept: FAMILY MEDICINE CLINIC | Age: 73
End: 2018-03-26
Payer: MEDICARE

## 2018-03-26 VITALS
RESPIRATION RATE: 10 BRPM | HEART RATE: 67 BPM | HEIGHT: 72 IN | DIASTOLIC BLOOD PRESSURE: 82 MMHG | BODY MASS INDEX: 35.49 KG/M2 | OXYGEN SATURATION: 98 % | SYSTOLIC BLOOD PRESSURE: 136 MMHG | TEMPERATURE: 97.8 F | WEIGHT: 262 LBS

## 2018-03-26 DIAGNOSIS — Z86.79 HISTORY OF CRESCENDO ANGINA: ICD-10-CM

## 2018-03-26 DIAGNOSIS — I25.10 CORONARY ARTERY DISEASE INVOLVING NATIVE CORONARY ARTERY OF NATIVE HEART, ANGINA PRESENCE UNSPECIFIED: Primary | ICD-10-CM

## 2018-03-26 LAB — HBA1C MFR BLD: 8.2 %

## 2018-03-26 PROCEDURE — 99495 TRANSJ CARE MGMT MOD F2F 14D: CPT | Performed by: FAMILY MEDICINE

## 2018-03-26 PROCEDURE — 83036 HEMOGLOBIN GLYCOSYLATED A1C: CPT | Performed by: FAMILY MEDICINE

## 2018-03-26 PROCEDURE — 1111F DSCHRG MED/CURRENT MED MERGE: CPT | Performed by: FAMILY MEDICINE

## 2018-03-26 RX ORDER — NITROGLYCERIN 400 UG/1
1 SPRAY ORAL PRN
Refills: 0 | COMMUNITY
Start: 2017-12-28

## 2018-03-26 NOTE — PROGRESS NOTES
capsule  Take 0.25 mcg by mouth daily             Cholecalciferol (VITAMIN D3) 2000 UNITS CAPS    Take 1 capsule by mouth daily              CPAP Machine MISC  by Does not apply route Please change CPAP pressure to 14 cm H20.             EFFIENT 10 MG TABS  Take 1 tablet by mouth daily             fluticasone (FLONASE) 50 MCG/ACT nasal spray  1 spray by Nasal route daily             glucagon 1 MG injection  Inject 1 mg into the skin See Admin Instructions. Follow package directions for low blood sugar. glucose blood VI test strips (ACCU-CHEK TERESA PLUS) strip  Check BS 4 times a day             HUMALOG KWIKPEN 100 UNIT/ML pen  INJECT PER SLIDING SCALE THREE TIMES A DAY BEFORE MEALS (MAX OF 80 UNITS DAILY)             insulin glargine (LANTUS SOLOSTAR) 100 UNIT/ML injection pen  34 units in am, 32 units in pm             isosorbide mononitrate (IMDUR) 120 MG extended release tablet  TAKE 1 TABLET EVERY MORNING             levothyroxine (SYNTHROID) 50 MCG tablet  TAKE 1 TABLET MONDAY THROUGH SATURDAY, THEN TAKE ONE AND ONE-HALF TABLETS ON SUNDAY             Liraglutide (VICTOZA) 18 MG/3ML SOPN SC injection  INJECT 1.8 MG INTO THE SKIN DAILY             losartan (COZAAR) 50 MG tablet  Take 1 tablet by mouth daily             Metoprolol Tartrate 75 MG TABS  TAKE 1 TABLET TWICE A DAY             metroNIDAZOLE (METROGEL) 0.75 % gel  Apply topically 2 times daily.              Multiple Vitamin (MULTI-VITAMIN) TABS    Take 1 tablet by mouth daily              nitroGLYCERIN (NITROLINGUAL) 0.4 MG/SPRAY 0.4 mg spray  Place 1 spray under the tongue as needed             sertraline (ZOLOFT) 100 MG tablet  TAKE 1 TABLET DAILY                   Medications marked \"taking\" at this time  Outpatient Prescriptions Marked as Taking for the 3/26/18 encounter (Office Visit) with Efrain Franklin MD   Medication Sig Dispense Refill    Metoprolol Tartrate 75 MG TABS TAKE 1 TABLET TWICE A  tablet 0    aspirin 81 MG EC tablet Take 81 mg by mouth daily      insulin glargine (LANTUS SOLOSTAR) 100 UNIT/ML injection pen 34 units in am, 32 units in pm 5 pen 3    sertraline (ZOLOFT) 100 MG tablet TAKE 1 TABLET DAILY 90 tablet 1    calcitRIOL (ROCALTROL) 0.25 MCG capsule Take 0.25 mcg by mouth daily      HUMALOG KWIKPEN 100 UNIT/ML pen INJECT PER SLIDING SCALE THREE TIMES A DAY BEFORE MEALS (MAX OF 80 UNITS DAILY) 60 mL 1    atorvastatin (LIPITOR) 80 MG tablet TAKE 1 TABLET DAILY 90 tablet 1    fluticasone (FLONASE) 50 MCG/ACT nasal spray 1 spray by Nasal route daily (Patient taking differently: 1 spray by Nasal route daily as needed ) 1 Bottle 3    levothyroxine (SYNTHROID) 50 MCG tablet TAKE 1 TABLET MONDAY THROUGH SATURDAY, THEN TAKE ONE AND ONE-HALF TABLETS ON SUNDAY 90 tablet 1    Liraglutide (VICTOZA) 18 MG/3ML SOPN SC injection INJECT 1.8 MG INTO THE SKIN DAILY 9 Pen 2    bumetanide (BUMEX) 2 MG tablet Take 1 tablet by mouth daily      isosorbide mononitrate (IMDUR) 120 MG extended release tablet TAKE 1 TABLET EVERY MORNING 90 tablet 2    EFFIENT 10 MG TABS Take 1 tablet by mouth daily      losartan (COZAAR) 50 MG tablet Take 1 tablet by mouth daily 90 tablet 1    CPAP Machine MISC by Does not apply route Please change CPAP pressure to 14 cm H20. 1 each 0    albuterol sulfate HFA (PROVENTIL HFA) 108 (90 BASE) MCG/ACT inhaler Inhale 2 puffs into the lungs every 6 hours as needed for Wheezing 1 Inhaler 3    glucose blood VI test strips (ACCU-CHEK TERESA PLUS) strip Check BS 4 times a day 400 each 1    B-D ULTRAFINE III SHORT PEN 31G X 8 MM MISC USE TO INJECT INSULIN UNDER THE SKIN FOUR TIMES A  each 1    Acetaminophen 650 MG TABS Take 650 mg by mouth every 4 hours as needed 120 tablet 3    Cholecalciferol (VITAMIN D3) 2000 UNITS CAPS   Take 1 capsule by mouth daily       glucagon 1 MG injection Inject 1 mg into the skin See Admin Instructions. Follow package directions for low blood sugar.  1 kit 3    Multiple Vitamin (MULTI-VITAMIN) TABS   Take 1 tablet by mouth daily           Medications patient taking as of now reconciled against medications ordered at time of hospital discharge: Yes    Vitals:    03/26/18 1604   BP: 136/82   Site: Left Arm   Position: Sitting   Cuff Size: Medium Adult   Pulse: 67   Resp: 10   Temp: 97.8 °F (36.6 °C)   TempSrc: Oral   SpO2: 98%   Weight: 262 lb (118.8 kg)   Height: 6' 0.01\" (1.829 m)     Body mass index is 35.53 kg/m². Wt Readings from Last 3 Encounters:   03/26/18 262 lb (118.8 kg)   03/21/18 265 lb 9.6 oz (120.5 kg)   01/25/18 266 lb (120.7 kg)     BP Readings from Last 3 Encounters:   03/26/18 136/82   03/23/18 (!) 153/72   01/25/18 (!) 142/80        Inpatient course: Discharge summary reviewed- see chart.     Chief Complaint   Patient presents with   [address] from [address] [place] [address] 3/23/18 stents    Diabetes     History of Present illness - Follow up of Hospital diagnosis(es): CAD      Non face to face  following discharge, date last encounter closed (first attempt may have been earlier): 3/24/2018 12:05 PM    Call initiated 2 business days of discharge: Yes     Interval history/Current status: improved and stable    Physical Exam:  General Appearance: alert and oriented to person, place and time, well developed and well- nourished, in no acute distress  Skin: warm and dry, no rash or erythema  Head: normocephalic and atraumatic  Eyes: pupils equal, round, and reactive to light, extraocular eye movements intact, conjunctivae normal  ENT: tympanic membrane, external ear and ear canal normal bilaterally, nose without deformity, nasal mucosa and turbinates normal without polyps  Neck: supple and non-tender without mass, no thyromegaly or thyroid nodules, no cervical lymphadenopathy  Pulmonary/Chest: clear to auscultation bilaterally- no wheezes, rales or rhonchi, normal air movement, no respiratory distress  Cardiovascular: normal rate, regular rhythm, normal S1 and S2, no murmurs, rubs, clicks, or gallops, distal pulses intact, no carotid bruits  Abdomen: soft, non-tender, non-distended, normal bowel sounds, no masses or organomegaly  Extremities: no cyanosis, clubbing or edema        Assessment/Plan:  Ethan Richards was seen today for follow-up from hospital and diabetes.     Diagnoses and all orders for this visit:    Coronary artery disease involving native coronary artery of native heart, angina presence unspecified  -     AK DISCHARGE MEDS RECONCILED W/ CURRENT OUTPATIENT MED LIST    History of crescendo angina  -     AK DISCHARGE MEDS RECONCILED W/ CURRENT OUTPATIENT MED LIST    Uncontrolled type 2 diabetes mellitus with diabetic nephropathy, with long-term current use of insulin (HCC)  -     POCT glycosylated hemoglobin (Hb A1C)    Other orders  -     Cancel:  DIABETES FOOT EXAM      Increase Lantus to 36 in am and 34 in pm    Medical Decision Making: giovanni Martinez MD

## 2018-03-27 ENCOUNTER — CARE COORDINATION (OUTPATIENT)
Dept: CASE MANAGEMENT | Age: 73
End: 2018-03-27

## 2018-03-28 RX ORDER — LEVOTHYROXINE SODIUM 0.05 MG/1
TABLET ORAL
Qty: 90 TABLET | Refills: 1 | Status: SHIPPED | OUTPATIENT
Start: 2018-03-28 | End: 2018-09-14 | Stop reason: SDUPTHER

## 2018-04-02 ENCOUNTER — CARE COORDINATION (OUTPATIENT)
Dept: CASE MANAGEMENT | Age: 73
End: 2018-04-02

## 2018-04-03 ENCOUNTER — TELEPHONE (OUTPATIENT)
Dept: FAMILY MEDICINE CLINIC | Age: 73
End: 2018-04-03

## 2018-04-04 ENCOUNTER — CARE COORDINATION (OUTPATIENT)
Dept: CASE MANAGEMENT | Age: 73
End: 2018-04-04

## 2018-04-05 ENCOUNTER — CARE COORDINATION (OUTPATIENT)
Dept: CARE COORDINATION | Age: 73
End: 2018-04-05

## 2018-04-05 ASSESSMENT — ENCOUNTER SYMPTOMS: DYSPNEA ASSOCIATED WITH: EXERTION

## 2018-04-09 RX ORDER — ATENOLOL 50 MG/1
50 TABLET ORAL DAILY
Qty: 90 TABLET | Refills: 1 | Status: SHIPPED | OUTPATIENT
Start: 2018-04-09 | End: 2018-09-18 | Stop reason: SDUPTHER

## 2018-04-13 RX ORDER — TIOTROPIUM BROMIDE 18 UG/1
CAPSULE ORAL; RESPIRATORY (INHALATION)
Qty: 90 CAPSULE | Refills: 3 | Status: SHIPPED | OUTPATIENT
Start: 2018-04-13 | End: 2018-04-19

## 2018-04-19 ENCOUNTER — OFFICE VISIT (OUTPATIENT)
Dept: PULMONOLOGY | Age: 73
End: 2018-04-19
Payer: MEDICARE

## 2018-04-19 VITALS
DIASTOLIC BLOOD PRESSURE: 72 MMHG | OXYGEN SATURATION: 98 % | SYSTOLIC BLOOD PRESSURE: 126 MMHG | BODY MASS INDEX: 36.3 KG/M2 | HEIGHT: 72 IN | WEIGHT: 268 LBS | HEART RATE: 65 BPM

## 2018-04-19 DIAGNOSIS — I50.32 DIASTOLIC DYSFUNCTION WITH CHRONIC HEART FAILURE (HCC): ICD-10-CM

## 2018-04-19 DIAGNOSIS — G47.33 OSA TREATED WITH BIPAP: Primary | ICD-10-CM

## 2018-04-19 PROCEDURE — 99213 OFFICE O/P EST LOW 20 MIN: CPT | Performed by: INTERNAL MEDICINE

## 2018-04-24 ENCOUNTER — CARE COORDINATION (OUTPATIENT)
Dept: CARE COORDINATION | Age: 73
End: 2018-04-24

## 2018-04-27 ENCOUNTER — CARE COORDINATION (OUTPATIENT)
Dept: CASE MANAGEMENT | Age: 73
End: 2018-04-27

## 2018-05-21 ENCOUNTER — OFFICE VISIT (OUTPATIENT)
Dept: PULMONOLOGY | Age: 73
End: 2018-05-21
Payer: MEDICARE

## 2018-05-21 VITALS
SYSTOLIC BLOOD PRESSURE: 130 MMHG | HEART RATE: 85 BPM | BODY MASS INDEX: 36.68 KG/M2 | OXYGEN SATURATION: 97 % | WEIGHT: 270.8 LBS | HEIGHT: 72 IN | DIASTOLIC BLOOD PRESSURE: 70 MMHG

## 2018-05-21 DIAGNOSIS — G47.33 OSA (OBSTRUCTIVE SLEEP APNEA): Primary | ICD-10-CM

## 2018-05-21 PROCEDURE — 99213 OFFICE O/P EST LOW 20 MIN: CPT | Performed by: INTERNAL MEDICINE

## 2018-05-22 ENCOUNTER — TELEPHONE (OUTPATIENT)
Dept: SLEEP CENTER | Age: 73
End: 2018-05-22

## 2018-05-23 ENCOUNTER — HOSPITAL ENCOUNTER (INPATIENT)
Age: 73
LOS: 4 days | Discharge: HOME | DRG: 291 | End: 2018-05-27
Attending: INTERNAL MEDICINE | Admitting: INTERNAL MEDICINE
Payer: MEDICARE

## 2018-05-23 ENCOUNTER — APPOINTMENT (OUTPATIENT)
Dept: GENERAL RADIOLOGY | Age: 73
DRG: 291 | End: 2018-05-23
Payer: MEDICARE

## 2018-05-23 DIAGNOSIS — R60.9 PERIPHERAL EDEMA: Primary | ICD-10-CM

## 2018-05-23 DIAGNOSIS — R05.3 CHRONIC COUGH: ICD-10-CM

## 2018-05-23 DIAGNOSIS — I50.9 CONGESTIVE HEART FAILURE, UNSPECIFIED CONGESTIVE HEART FAILURE CHRONICITY, UNSPECIFIED CONGESTIVE HEART FAILURE TYPE: ICD-10-CM

## 2018-05-23 DIAGNOSIS — R93.0 ABNORMAL CT SCAN, SINUS: ICD-10-CM

## 2018-05-23 DIAGNOSIS — I50.9 CONGESTIVE HEART FAILURE OF UNKNOWN ETIOLOGY (HCC): ICD-10-CM

## 2018-05-23 LAB
ALBUMIN SERPL-MCNC: 3.4 G/DL (ref 3.5–5.1)
ALP BLD-CCNC: 165 U/L (ref 38–126)
ALT SERPL-CCNC: 31 U/L (ref 11–66)
ANION GAP SERPL CALCULATED.3IONS-SCNC: 12 MEQ/L (ref 8–16)
ANISOCYTOSIS: ABNORMAL
AST SERPL-CCNC: 36 U/L (ref 5–40)
BACTERIA: ABNORMAL /HPF
BASOPHILS # BLD: 0.7 %
BASOPHILS ABSOLUTE: 0 THOU/MM3 (ref 0–0.1)
BILIRUB SERPL-MCNC: 0.5 MG/DL (ref 0.3–1.2)
BILIRUBIN URINE: NEGATIVE
BLOOD, URINE: ABNORMAL
BUN BLDV-MCNC: 31 MG/DL (ref 7–22)
CALCIUM SERPL-MCNC: 8.5 MG/DL (ref 8.5–10.5)
CASTS 2: ABNORMAL /LPF
CASTS UA: ABNORMAL /LPF
CHARACTER, URINE: CLEAR
CHLORIDE BLD-SCNC: 108 MEQ/L (ref 98–111)
CO2: 19 MEQ/L (ref 23–33)
COLOR: YELLOW
CREAT SERPL-MCNC: 2 MG/DL (ref 0.4–1.2)
CRYSTALS, UA: ABNORMAL
EOSINOPHIL # BLD: 1 %
EOSINOPHILS ABSOLUTE: 0 THOU/MM3 (ref 0–0.4)
EPITHELIAL CELLS, UA: ABNORMAL /HPF
GFR SERPL CREATININE-BSD FRML MDRD: 33 ML/MIN/1.73M2
GLUCOSE BLD-MCNC: 119 MG/DL (ref 70–108)
GLUCOSE BLD-MCNC: 65 MG/DL (ref 70–108)
GLUCOSE URINE: NEGATIVE MG/DL
HCT VFR BLD CALC: 39.9 % (ref 42–52)
HEMOGLOBIN: 13.4 GM/DL (ref 14–18)
KETONES, URINE: NEGATIVE
LACTIC ACID: 1.2 MMOL/L (ref 0.5–2.2)
LEUKOCYTE ESTERASE, URINE: NEGATIVE
LYMPHOCYTES # BLD: 12.3 %
LYMPHOCYTES ABSOLUTE: 0.6 THOU/MM3 (ref 1–4.8)
MCH RBC QN AUTO: 30.9 PG (ref 27–31)
MCHC RBC AUTO-ENTMCNC: 33.6 GM/DL (ref 33–37)
MCV RBC AUTO: 92 FL (ref 80–94)
MISCELLANEOUS 2: ABNORMAL
MONOCYTES # BLD: 16.2 %
MONOCYTES ABSOLUTE: 0.7 THOU/MM3 (ref 0.4–1.3)
NITRITE, URINE: NEGATIVE
NUCLEATED RED BLOOD CELLS: 0 /100 WBC
OSMOLALITY CALCULATION: 282.2 MOSMOL/KG (ref 275–300)
PDW BLD-RTO: 15.8 % (ref 11.5–14.5)
PH UA: 5
PLATELET # BLD: 217 THOU/MM3 (ref 130–400)
PMV BLD AUTO: 9.5 FL (ref 7.4–10.4)
POTASSIUM SERPL-SCNC: 3.9 MEQ/L (ref 3.5–5.2)
PRO-BNP: 5090 PG/ML (ref 0–900)
PROTEIN UA: 300
RBC # BLD: 4.34 MILL/MM3 (ref 4.7–6.1)
RBC URINE: ABNORMAL /HPF
RENAL EPITHELIAL, UA: ABNORMAL
SEG NEUTROPHILS: 69.8 %
SEGMENTED NEUTROPHILS ABSOLUTE COUNT: 3.1 THOU/MM3 (ref 1.8–7.7)
SODIUM BLD-SCNC: 139 MEQ/L (ref 135–145)
SPECIFIC GRAVITY, URINE: 1.01 (ref 1–1.03)
TOTAL PROTEIN: 6.6 G/DL (ref 6.1–8)
TROPONIN T: < 0.01 NG/ML
UROBILINOGEN, URINE: 0.2 EU/DL
WBC # BLD: 4.5 THOU/MM3 (ref 4.8–10.8)
WBC UA: ABNORMAL /HPF
YEAST: ABNORMAL

## 2018-05-23 PROCEDURE — 93005 ELECTROCARDIOGRAM TRACING: CPT | Performed by: INTERNAL MEDICINE

## 2018-05-23 PROCEDURE — 2140000000 HC CCU INTERMEDIATE R&B

## 2018-05-23 PROCEDURE — 94640 AIRWAY INHALATION TREATMENT: CPT

## 2018-05-23 PROCEDURE — 84484 ASSAY OF TROPONIN QUANT: CPT

## 2018-05-23 PROCEDURE — 99285 EMERGENCY DEPT VISIT HI MDM: CPT

## 2018-05-23 PROCEDURE — 81001 URINALYSIS AUTO W/SCOPE: CPT

## 2018-05-23 PROCEDURE — 6370000000 HC RX 637 (ALT 250 FOR IP): Performed by: INTERNAL MEDICINE

## 2018-05-23 PROCEDURE — 83880 ASSAY OF NATRIURETIC PEPTIDE: CPT

## 2018-05-23 PROCEDURE — 1200000003 HC TELEMETRY R&B

## 2018-05-23 PROCEDURE — 6370000000 HC RX 637 (ALT 250 FOR IP)

## 2018-05-23 PROCEDURE — 2580000003 HC RX 258: Performed by: INTERNAL MEDICINE

## 2018-05-23 PROCEDURE — 2500000003 HC RX 250 WO HCPCS: Performed by: INTERNAL MEDICINE

## 2018-05-23 PROCEDURE — 83605 ASSAY OF LACTIC ACID: CPT

## 2018-05-23 PROCEDURE — 71046 X-RAY EXAM CHEST 2 VIEWS: CPT

## 2018-05-23 PROCEDURE — G0378 HOSPITAL OBSERVATION PER HR: HCPCS

## 2018-05-23 PROCEDURE — 96374 THER/PROPH/DIAG INJ IV PUSH: CPT

## 2018-05-23 PROCEDURE — 85025 COMPLETE CBC W/AUTO DIFF WBC: CPT

## 2018-05-23 PROCEDURE — 6360000002 HC RX W HCPCS: Performed by: INTERNAL MEDICINE

## 2018-05-23 PROCEDURE — 36415 COLL VENOUS BLD VENIPUNCTURE: CPT

## 2018-05-23 PROCEDURE — 82948 REAGENT STRIP/BLOOD GLUCOSE: CPT

## 2018-05-23 PROCEDURE — 80053 COMPREHEN METABOLIC PANEL: CPT

## 2018-05-23 RX ORDER — LEVOTHYROXINE SODIUM 0.03 MG/1
25 TABLET ORAL WEEKLY
Status: DISCONTINUED | OUTPATIENT
Start: 2018-05-27 | End: 2018-05-27 | Stop reason: HOSPADM

## 2018-05-23 RX ORDER — ACETAMINOPHEN 500 MG
TABLET ORAL
Status: COMPLETED
Start: 2018-05-23 | End: 2018-05-23

## 2018-05-23 RX ORDER — SERTRALINE HYDROCHLORIDE 100 MG/1
100 TABLET, FILM COATED ORAL DAILY
Status: DISCONTINUED | OUTPATIENT
Start: 2018-05-24 | End: 2018-05-27 | Stop reason: HOSPADM

## 2018-05-23 RX ORDER — SODIUM CHLORIDE 0.9 % (FLUSH) 0.9 %
10 SYRINGE (ML) INJECTION PRN
Status: DISCONTINUED | OUTPATIENT
Start: 2018-05-23 | End: 2018-05-27 | Stop reason: HOSPADM

## 2018-05-23 RX ORDER — ASPIRIN 81 MG/1
81 TABLET ORAL DAILY
Status: DISCONTINUED | OUTPATIENT
Start: 2018-05-24 | End: 2018-05-27 | Stop reason: HOSPADM

## 2018-05-23 RX ORDER — SODIUM CHLORIDE 0.9 % (FLUSH) 0.9 %
10 SYRINGE (ML) INJECTION EVERY 12 HOURS SCHEDULED
Status: DISCONTINUED | OUTPATIENT
Start: 2018-05-23 | End: 2018-05-27 | Stop reason: HOSPADM

## 2018-05-23 RX ORDER — INSULIN GLARGINE 100 [IU]/ML
36 INJECTION, SOLUTION SUBCUTANEOUS NIGHTLY
COMMUNITY
End: 2019-01-18 | Stop reason: CLARIF

## 2018-05-23 RX ORDER — NITROGLYCERIN 0.4 MG/1
0.4 TABLET SUBLINGUAL EVERY 5 MIN PRN
Status: DISCONTINUED | OUTPATIENT
Start: 2018-05-23 | End: 2018-05-23 | Stop reason: SDUPTHER

## 2018-05-23 RX ORDER — ATENOLOL 50 MG/1
50 TABLET ORAL DAILY
Status: DISCONTINUED | OUTPATIENT
Start: 2018-05-24 | End: 2018-05-27 | Stop reason: HOSPADM

## 2018-05-23 RX ORDER — FUROSEMIDE 10 MG/ML
40 INJECTION INTRAMUSCULAR; INTRAVENOUS ONCE
Status: COMPLETED | OUTPATIENT
Start: 2018-05-23 | End: 2018-05-23

## 2018-05-23 RX ORDER — ONDANSETRON 2 MG/ML
4 INJECTION INTRAMUSCULAR; INTRAVENOUS EVERY 6 HOURS PRN
Status: DISCONTINUED | OUTPATIENT
Start: 2018-05-23 | End: 2018-05-27 | Stop reason: HOSPADM

## 2018-05-23 RX ORDER — HYDRALAZINE HYDROCHLORIDE 50 MG/1
50 TABLET, FILM COATED ORAL 3 TIMES DAILY PRN
Status: ON HOLD | COMMUNITY
End: 2020-01-01 | Stop reason: ALTCHOICE

## 2018-05-23 RX ORDER — IPRATROPIUM BROMIDE AND ALBUTEROL SULFATE 2.5; .5 MG/3ML; MG/3ML
1 SOLUTION RESPIRATORY (INHALATION) ONCE
Status: COMPLETED | OUTPATIENT
Start: 2018-05-23 | End: 2018-05-23

## 2018-05-23 RX ORDER — ASPIRIN 81 MG/1
81 TABLET, CHEWABLE ORAL DAILY
Status: DISCONTINUED | OUTPATIENT
Start: 2018-05-24 | End: 2018-05-23 | Stop reason: SDUPTHER

## 2018-05-23 RX ORDER — POTASSIUM CHLORIDE 20MEQ/15ML
40 LIQUID (ML) ORAL PRN
Status: DISCONTINUED | OUTPATIENT
Start: 2018-05-23 | End: 2018-05-27 | Stop reason: HOSPADM

## 2018-05-23 RX ORDER — LEVOTHYROXINE SODIUM 0.03 MG/1
25 TABLET ORAL WEEKLY
Status: DISCONTINUED | OUTPATIENT
Start: 2018-05-24 | End: 2018-05-23

## 2018-05-23 RX ORDER — LEVOTHYROXINE SODIUM 0.05 MG/1
50 TABLET ORAL
Status: DISCONTINUED | OUTPATIENT
Start: 2018-05-24 | End: 2018-05-27 | Stop reason: HOSPADM

## 2018-05-23 RX ORDER — PRASUGREL 10 MG/1
10 TABLET, FILM COATED ORAL DAILY
Status: DISCONTINUED | OUTPATIENT
Start: 2018-05-24 | End: 2018-05-27 | Stop reason: HOSPADM

## 2018-05-23 RX ORDER — LOSARTAN POTASSIUM 50 MG/1
50 TABLET ORAL DAILY
Status: DISCONTINUED | OUTPATIENT
Start: 2018-05-24 | End: 2018-05-27 | Stop reason: HOSPADM

## 2018-05-23 RX ORDER — HYDRALAZINE HYDROCHLORIDE 50 MG/1
50 TABLET, FILM COATED ORAL 3 TIMES DAILY PRN
Status: DISCONTINUED | OUTPATIENT
Start: 2018-05-23 | End: 2018-05-27 | Stop reason: HOSPADM

## 2018-05-23 RX ORDER — CALCITRIOL 0.25 UG/1
0.25 CAPSULE, LIQUID FILLED ORAL EVERY OTHER DAY
Status: DISCONTINUED | OUTPATIENT
Start: 2018-05-25 | End: 2018-05-27 | Stop reason: HOSPADM

## 2018-05-23 RX ORDER — BUMETANIDE 1 MG/1
2 TABLET ORAL DAILY
Status: DISCONTINUED | OUTPATIENT
Start: 2018-05-24 | End: 2018-05-24

## 2018-05-23 RX ORDER — ACETAMINOPHEN 325 MG/1
650 TABLET ORAL EVERY 4 HOURS PRN
Status: DISCONTINUED | OUTPATIENT
Start: 2018-05-23 | End: 2018-05-27 | Stop reason: HOSPADM

## 2018-05-23 RX ORDER — DEXTROSE MONOHYDRATE 50 MG/ML
100 INJECTION, SOLUTION INTRAVENOUS PRN
Status: DISCONTINUED | OUTPATIENT
Start: 2018-05-23 | End: 2018-05-27 | Stop reason: HOSPADM

## 2018-05-23 RX ORDER — ALBUTEROL SULFATE 90 UG/1
2 AEROSOL, METERED RESPIRATORY (INHALATION) EVERY 6 HOURS PRN
Status: DISCONTINUED | OUTPATIENT
Start: 2018-05-23 | End: 2018-05-27 | Stop reason: HOSPADM

## 2018-05-23 RX ORDER — ACETAMINOPHEN 500 MG
1000 TABLET ORAL ONCE
Status: COMPLETED | OUTPATIENT
Start: 2018-05-23 | End: 2018-05-23

## 2018-05-23 RX ORDER — NITROGLYCERIN 0.4 MG/1
0.4 TABLET SUBLINGUAL EVERY 5 MIN PRN
Status: DISCONTINUED | OUTPATIENT
Start: 2018-05-23 | End: 2018-05-27 | Stop reason: HOSPADM

## 2018-05-23 RX ORDER — NICOTINE POLACRILEX 4 MG
15 LOZENGE BUCCAL PRN
Status: DISCONTINUED | OUTPATIENT
Start: 2018-05-23 | End: 2018-05-27 | Stop reason: HOSPADM

## 2018-05-23 RX ORDER — POTASSIUM CHLORIDE 7.45 MG/ML
10 INJECTION INTRAVENOUS PRN
Status: DISCONTINUED | OUTPATIENT
Start: 2018-05-23 | End: 2018-05-27 | Stop reason: HOSPADM

## 2018-05-23 RX ORDER — ATORVASTATIN CALCIUM 80 MG/1
80 TABLET, FILM COATED ORAL NIGHTLY
Status: DISCONTINUED | OUTPATIENT
Start: 2018-05-23 | End: 2018-05-27 | Stop reason: HOSPADM

## 2018-05-23 RX ORDER — POTASSIUM CHLORIDE 20 MEQ/1
40 TABLET, EXTENDED RELEASE ORAL PRN
Status: DISCONTINUED | OUTPATIENT
Start: 2018-05-23 | End: 2018-05-27 | Stop reason: HOSPADM

## 2018-05-23 RX ORDER — DEXTROSE MONOHYDRATE 25 G/50ML
12.5 INJECTION, SOLUTION INTRAVENOUS PRN
Status: DISCONTINUED | OUTPATIENT
Start: 2018-05-23 | End: 2018-05-27 | Stop reason: HOSPADM

## 2018-05-23 RX ADMIN — Medication 1000 MG: at 15:41

## 2018-05-23 RX ADMIN — ACETAMINOPHEN 1000 MG: 500 TABLET ORAL at 15:41

## 2018-05-23 RX ADMIN — BUMETANIDE 1 MG/HR: 0.25 INJECTION INTRAMUSCULAR; INTRAVENOUS at 22:38

## 2018-05-23 RX ADMIN — FUROSEMIDE 40 MG: 10 INJECTION, SOLUTION INTRAMUSCULAR; INTRAVENOUS at 15:22

## 2018-05-23 RX ADMIN — IPRATROPIUM BROMIDE AND ALBUTEROL SULFATE 1 AMPULE: .5; 3 SOLUTION RESPIRATORY (INHALATION) at 14:13

## 2018-05-23 ASSESSMENT — ENCOUNTER SYMPTOMS
COUGH: 1
WHEEZING: 0
NAUSEA: 1
SHORTNESS OF BREATH: 1
VOMITING: 1
ABDOMINAL PAIN: 0
DIARRHEA: 0
RHINORRHEA: 0
SORE THROAT: 0
BACK PAIN: 0
EYE DISCHARGE: 0
EYE REDNESS: 0

## 2018-05-23 ASSESSMENT — PAIN SCALES - GENERAL
PAINLEVEL_OUTOF10: 0
PAINLEVEL_OUTOF10: 8
PAINLEVEL_OUTOF10: 8

## 2018-05-23 ASSESSMENT — PAIN DESCRIPTION - ORIENTATION: ORIENTATION: ANTERIOR

## 2018-05-23 ASSESSMENT — PAIN DESCRIPTION - ONSET: ONSET: SUDDEN

## 2018-05-23 ASSESSMENT — PAIN DESCRIPTION - PAIN TYPE: TYPE: ACUTE PAIN

## 2018-05-23 ASSESSMENT — PAIN DESCRIPTION - LOCATION: LOCATION: HEAD

## 2018-05-23 ASSESSMENT — PAIN DESCRIPTION - DESCRIPTORS: DESCRIPTORS: PRESSURE;ACHING

## 2018-05-23 ASSESSMENT — PAIN DESCRIPTION - FREQUENCY: FREQUENCY: CONTINUOUS

## 2018-05-24 LAB
ALBUMIN SERPL-MCNC: 3.3 G/DL (ref 3.5–5.1)
ALP BLD-CCNC: 159 U/L (ref 38–126)
ALT SERPL-CCNC: 32 U/L (ref 11–66)
AST SERPL-CCNC: 40 U/L (ref 5–40)
BILIRUB SERPL-MCNC: 0.6 MG/DL (ref 0.3–1.2)
BILIRUBIN DIRECT: < 0.2 MG/DL (ref 0–0.3)
CHOLESTEROL, TOTAL: 95 MG/DL (ref 100–199)
EKG ATRIAL RATE: 241 BPM
EKG ATRIAL RATE: 82 BPM
EKG P AXIS: 30 DEGREES
EKG P-R INTERVAL: 210 MS
EKG Q-T INTERVAL: 446 MS
EKG Q-T INTERVAL: 510 MS
EKG QRS DURATION: 102 MS
EKG QRS DURATION: 180 MS
EKG QTC CALCULATION (BAZETT): 521 MS
EKG QTC CALCULATION (BAZETT): 526 MS
EKG R AXIS: -82 DEGREES
EKG R AXIS: 74 DEGREES
EKG T AXIS: -86 DEGREES
EKG T AXIS: 92 DEGREES
EKG VENTRICULAR RATE: 64 BPM
EKG VENTRICULAR RATE: 82 BPM
GLUCOSE BLD-MCNC: 133 MG/DL (ref 70–108)
GLUCOSE BLD-MCNC: 160 MG/DL (ref 70–108)
GLUCOSE BLD-MCNC: 170 MG/DL (ref 70–108)
GLUCOSE BLD-MCNC: 296 MG/DL (ref 70–108)
GLUCOSE BLD-MCNC: 311 MG/DL (ref 70–108)
GLUCOSE BLD-MCNC: 353 MG/DL (ref 70–108)
HCT VFR BLD CALC: 40.8 % (ref 42–52)
HDLC SERPL-MCNC: 32 MG/DL
HEMOGLOBIN: 13.8 GM/DL (ref 14–18)
LDL CHOLESTEROL CALCULATED: 43 MG/DL
MCH RBC QN AUTO: 31.1 PG (ref 27–31)
MCHC RBC AUTO-ENTMCNC: 33.8 GM/DL (ref 33–37)
MCV RBC AUTO: 91.8 FL (ref 80–94)
PDW BLD-RTO: 16.5 % (ref 11.5–14.5)
PLATELET # BLD: 211 THOU/MM3 (ref 130–400)
PMV BLD AUTO: 9.3 FL (ref 7.4–10.4)
POTASSIUM SERPL-SCNC: 4.1 MEQ/L (ref 3.5–5.2)
RBC # BLD: 4.45 MILL/MM3 (ref 4.7–6.1)
TOTAL PROTEIN: 6.6 G/DL (ref 6.1–8)
TRIGL SERPL-MCNC: 102 MG/DL (ref 0–199)
WBC # BLD: 6.1 THOU/MM3 (ref 4.8–10.8)

## 2018-05-24 PROCEDURE — 36415 COLL VENOUS BLD VENIPUNCTURE: CPT

## 2018-05-24 PROCEDURE — 93010 ELECTROCARDIOGRAM REPORT: CPT | Performed by: INTERNAL MEDICINE

## 2018-05-24 PROCEDURE — 6370000000 HC RX 637 (ALT 250 FOR IP): Performed by: INTERNAL MEDICINE

## 2018-05-24 PROCEDURE — 93005 ELECTROCARDIOGRAM TRACING: CPT | Performed by: INTERNAL MEDICINE

## 2018-05-24 PROCEDURE — 85027 COMPLETE CBC AUTOMATED: CPT

## 2018-05-24 PROCEDURE — 87205 SMEAR GRAM STAIN: CPT

## 2018-05-24 PROCEDURE — 84132 ASSAY OF SERUM POTASSIUM: CPT

## 2018-05-24 PROCEDURE — 1200000003 HC TELEMETRY R&B

## 2018-05-24 PROCEDURE — 82948 REAGENT STRIP/BLOOD GLUCOSE: CPT

## 2018-05-24 PROCEDURE — 80076 HEPATIC FUNCTION PANEL: CPT

## 2018-05-24 PROCEDURE — 2580000003 HC RX 258: Performed by: INTERNAL MEDICINE

## 2018-05-24 PROCEDURE — 80061 LIPID PANEL: CPT

## 2018-05-24 PROCEDURE — 2140000000 HC CCU INTERMEDIATE R&B

## 2018-05-24 PROCEDURE — 2500000003 HC RX 250 WO HCPCS: Performed by: INTERNAL MEDICINE

## 2018-05-24 RX ORDER — ALPRAZOLAM 0.5 MG/1
0.5 TABLET ORAL ONCE
Status: COMPLETED | OUTPATIENT
Start: 2018-05-24 | End: 2018-05-24

## 2018-05-24 RX ADMIN — Medication 1 TABLET: at 10:03

## 2018-05-24 RX ADMIN — BUMETANIDE 1 MG/HR: 0.25 INJECTION INTRAMUSCULAR; INTRAVENOUS at 10:22

## 2018-05-24 RX ADMIN — BUMETANIDE 1 MG/HR: 0.25 INJECTION INTRAMUSCULAR; INTRAVENOUS at 21:01

## 2018-05-24 RX ADMIN — ATENOLOL 50 MG: 50 TABLET ORAL at 04:08

## 2018-05-24 RX ADMIN — ATORVASTATIN CALCIUM 80 MG: 80 TABLET, FILM COATED ORAL at 21:48

## 2018-05-24 RX ADMIN — ASPIRIN 81 MG: 81 TABLET ORAL at 10:02

## 2018-05-24 RX ADMIN — PRASUGREL 10 MG: 10 TABLET, FILM COATED ORAL at 10:07

## 2018-05-24 RX ADMIN — SERTRALINE HYDROCHLORIDE 100 MG: 100 TABLET, FILM COATED ORAL at 10:09

## 2018-05-24 RX ADMIN — LOSARTAN POTASSIUM 50 MG: 50 TABLET ORAL at 10:04

## 2018-05-24 RX ADMIN — LEVOTHYROXINE SODIUM 50 MCG: 0.05 TABLET ORAL at 06:53

## 2018-05-24 RX ADMIN — HYDRALAZINE HYDROCHLORIDE 50 MG: 50 TABLET, FILM COATED ORAL at 00:07

## 2018-05-24 RX ADMIN — ACETAMINOPHEN 650 MG: 325 TABLET ORAL at 04:16

## 2018-05-24 RX ADMIN — ALPRAZOLAM 0.5 MG: 0.5 TABLET ORAL at 21:47

## 2018-05-24 RX ADMIN — ATORVASTATIN CALCIUM 80 MG: 80 TABLET, FILM COATED ORAL at 00:06

## 2018-05-24 ASSESSMENT — PAIN SCALES - GENERAL
PAINLEVEL_OUTOF10: 0
PAINLEVEL_OUTOF10: 7

## 2018-05-25 LAB
GLUCOSE BLD-MCNC: 112 MG/DL (ref 70–108)
GLUCOSE BLD-MCNC: 170 MG/DL (ref 70–108)
GLUCOSE BLD-MCNC: 246 MG/DL (ref 70–108)
GLUCOSE BLD-MCNC: 59 MG/DL (ref 70–108)
GLUCOSE BLD-MCNC: 63 MG/DL (ref 70–108)
GLUCOSE BLD-MCNC: 71 MG/DL (ref 70–108)
POTASSIUM SERPL-SCNC: 3.2 MEQ/L (ref 3.5–5.2)
RESPIRATORY CULTURE: NORMAL

## 2018-05-25 PROCEDURE — 84132 ASSAY OF SERUM POTASSIUM: CPT

## 2018-05-25 PROCEDURE — 82948 REAGENT STRIP/BLOOD GLUCOSE: CPT

## 2018-05-25 PROCEDURE — 94640 AIRWAY INHALATION TREATMENT: CPT

## 2018-05-25 PROCEDURE — 1200000003 HC TELEMETRY R&B

## 2018-05-25 PROCEDURE — 36415 COLL VENOUS BLD VENIPUNCTURE: CPT

## 2018-05-25 PROCEDURE — 6370000000 HC RX 637 (ALT 250 FOR IP): Performed by: INTERNAL MEDICINE

## 2018-05-25 PROCEDURE — 2140000000 HC CCU INTERMEDIATE R&B

## 2018-05-25 PROCEDURE — 2500000003 HC RX 250 WO HCPCS: Performed by: INTERNAL MEDICINE

## 2018-05-25 PROCEDURE — 2580000003 HC RX 258: Performed by: INTERNAL MEDICINE

## 2018-05-25 RX ORDER — POTASSIUM CHLORIDE 20 MEQ/1
40 TABLET, EXTENDED RELEASE ORAL ONCE
Status: COMPLETED | OUTPATIENT
Start: 2018-05-25 | End: 2018-05-25

## 2018-05-25 RX ORDER — GUAIFENESIN 600 MG/1
600 TABLET, EXTENDED RELEASE ORAL 2 TIMES DAILY
Status: DISCONTINUED | OUTPATIENT
Start: 2018-05-25 | End: 2018-05-27 | Stop reason: HOSPADM

## 2018-05-25 RX ORDER — ALPRAZOLAM 0.5 MG/1
0.5 TABLET ORAL NIGHTLY PRN
Status: DISCONTINUED | OUTPATIENT
Start: 2018-05-25 | End: 2018-05-27 | Stop reason: HOSPADM

## 2018-05-25 RX ADMIN — Medication 2 PUFF: at 20:23

## 2018-05-25 RX ADMIN — PRASUGREL 10 MG: 10 TABLET, FILM COATED ORAL at 09:39

## 2018-05-25 RX ADMIN — POTASSIUM CHLORIDE 40 MEQ: 20 TABLET, EXTENDED RELEASE ORAL at 06:15

## 2018-05-25 RX ADMIN — Medication 1 TABLET: at 09:39

## 2018-05-25 RX ADMIN — SERTRALINE HYDROCHLORIDE 100 MG: 100 TABLET, FILM COATED ORAL at 09:40

## 2018-05-25 RX ADMIN — BUMETANIDE 1 MG/HR: 0.25 INJECTION INTRAMUSCULAR; INTRAVENOUS at 22:00

## 2018-05-25 RX ADMIN — ALPRAZOLAM 0.5 MG: 0.5 TABLET ORAL at 21:38

## 2018-05-25 RX ADMIN — Medication 10 ML: at 12:29

## 2018-05-25 RX ADMIN — CALCITRIOL 0.25 MCG: 0.25 CAPSULE, LIQUID FILLED ORAL at 09:38

## 2018-05-25 RX ADMIN — GUAIFENESIN 600 MG: 600 TABLET, EXTENDED RELEASE ORAL at 21:38

## 2018-05-25 RX ADMIN — BUMETANIDE 1 MG/HR: 0.25 INJECTION INTRAMUSCULAR; INTRAVENOUS at 12:29

## 2018-05-25 RX ADMIN — ACETAMINOPHEN 650 MG: 325 TABLET ORAL at 12:29

## 2018-05-25 RX ADMIN — ATORVASTATIN CALCIUM 80 MG: 80 TABLET, FILM COATED ORAL at 20:09

## 2018-05-25 RX ADMIN — ATENOLOL 50 MG: 50 TABLET ORAL at 12:16

## 2018-05-25 RX ADMIN — ASPIRIN 81 MG: 81 TABLET ORAL at 09:38

## 2018-05-25 RX ADMIN — LOSARTAN POTASSIUM 50 MG: 50 TABLET ORAL at 12:18

## 2018-05-25 RX ADMIN — LEVOTHYROXINE SODIUM 50 MCG: 0.05 TABLET ORAL at 06:18

## 2018-05-25 ASSESSMENT — PAIN SCALES - GENERAL
PAINLEVEL_OUTOF10: 7
PAINLEVEL_OUTOF10: 0

## 2018-05-26 ENCOUNTER — APPOINTMENT (OUTPATIENT)
Dept: CT IMAGING | Age: 73
DRG: 291 | End: 2018-05-26
Payer: MEDICARE

## 2018-05-26 ENCOUNTER — APPOINTMENT (OUTPATIENT)
Dept: GENERAL RADIOLOGY | Age: 73
DRG: 291 | End: 2018-05-26
Payer: MEDICARE

## 2018-05-26 LAB
ANION GAP SERPL CALCULATED.3IONS-SCNC: 17 MEQ/L (ref 8–16)
BUN BLDV-MCNC: 59 MG/DL (ref 7–22)
CALCIUM SERPL-MCNC: 8.8 MG/DL (ref 8.5–10.5)
CHLORIDE BLD-SCNC: 98 MEQ/L (ref 98–111)
CO2: 21 MEQ/L (ref 23–33)
CREAT SERPL-MCNC: 2.7 MG/DL (ref 0.4–1.2)
GFR SERPL CREATININE-BSD FRML MDRD: 23 ML/MIN/1.73M2
GLUCOSE BLD-MCNC: 101 MG/DL (ref 70–108)
GLUCOSE BLD-MCNC: 127 MG/DL (ref 70–108)
GLUCOSE BLD-MCNC: 188 MG/DL (ref 70–108)
GLUCOSE BLD-MCNC: 319 MG/DL (ref 70–108)
GLUCOSE BLD-MCNC: 363 MG/DL (ref 70–108)
POTASSIUM SERPL-SCNC: 3.4 MEQ/L (ref 3.5–5.2)
POTASSIUM SERPL-SCNC: 3.7 MEQ/L (ref 3.5–5.2)
SODIUM BLD-SCNC: 136 MEQ/L (ref 135–145)

## 2018-05-26 PROCEDURE — 36415 COLL VENOUS BLD VENIPUNCTURE: CPT

## 2018-05-26 PROCEDURE — 82948 REAGENT STRIP/BLOOD GLUCOSE: CPT

## 2018-05-26 PROCEDURE — 6370000000 HC RX 637 (ALT 250 FOR IP): Performed by: INTERNAL MEDICINE

## 2018-05-26 PROCEDURE — APPSS180 APP SPLIT SHARED TIME > 60 MINUTES: Performed by: NURSE PRACTITIONER

## 2018-05-26 PROCEDURE — 84132 ASSAY OF SERUM POTASSIUM: CPT

## 2018-05-26 PROCEDURE — 2140000000 HC CCU INTERMEDIATE R&B

## 2018-05-26 PROCEDURE — 2580000003 HC RX 258: Performed by: INTERNAL MEDICINE

## 2018-05-26 PROCEDURE — 99223 1ST HOSP IP/OBS HIGH 75: CPT | Performed by: INTERNAL MEDICINE

## 2018-05-26 PROCEDURE — 71250 CT THORAX DX C-: CPT

## 2018-05-26 PROCEDURE — 70486 CT MAXILLOFACIAL W/O DYE: CPT

## 2018-05-26 PROCEDURE — 71046 X-RAY EXAM CHEST 2 VIEWS: CPT

## 2018-05-26 PROCEDURE — 80048 BASIC METABOLIC PNL TOTAL CA: CPT

## 2018-05-26 RX ORDER — POTASSIUM CHLORIDE 20 MEQ/1
40 TABLET, EXTENDED RELEASE ORAL ONCE
Status: COMPLETED | OUTPATIENT
Start: 2018-05-26 | End: 2018-05-26

## 2018-05-26 RX ORDER — PANTOPRAZOLE SODIUM 40 MG/1
40 TABLET, DELAYED RELEASE ORAL
Status: DISCONTINUED | OUTPATIENT
Start: 2018-05-27 | End: 2018-05-27 | Stop reason: HOSPADM

## 2018-05-26 RX ORDER — BUMETANIDE 1 MG/1
2 TABLET ORAL 2 TIMES DAILY
Status: DISCONTINUED | OUTPATIENT
Start: 2018-05-26 | End: 2018-05-27 | Stop reason: HOSPADM

## 2018-05-26 RX ORDER — FLUTICASONE PROPIONATE 50 MCG
2 SPRAY, SUSPENSION (ML) NASAL DAILY
Status: DISCONTINUED | OUTPATIENT
Start: 2018-05-26 | End: 2018-05-27 | Stop reason: HOSPADM

## 2018-05-26 RX ADMIN — ALPRAZOLAM 0.5 MG: 0.5 TABLET ORAL at 20:29

## 2018-05-26 RX ADMIN — ATORVASTATIN CALCIUM 80 MG: 80 TABLET, FILM COATED ORAL at 20:30

## 2018-05-26 RX ADMIN — GUAIFENESIN 600 MG: 600 TABLET, EXTENDED RELEASE ORAL at 08:15

## 2018-05-26 RX ADMIN — Medication 1 TABLET: at 08:17

## 2018-05-26 RX ADMIN — GUAIFENESIN 600 MG: 600 TABLET, EXTENDED RELEASE ORAL at 20:29

## 2018-05-26 RX ADMIN — FLUTICASONE PROPIONATE 2 SPRAY: 50 SPRAY, METERED NASAL at 15:36

## 2018-05-26 RX ADMIN — ACETAMINOPHEN 650 MG: 325 TABLET ORAL at 20:29

## 2018-05-26 RX ADMIN — BUMETANIDE 2 MG: 1 TABLET ORAL at 12:05

## 2018-05-26 RX ADMIN — Medication 10 ML: at 23:01

## 2018-05-26 RX ADMIN — LEVOTHYROXINE SODIUM 50 MCG: 0.05 TABLET ORAL at 05:33

## 2018-05-26 RX ADMIN — ASPIRIN 81 MG: 81 TABLET ORAL at 08:15

## 2018-05-26 RX ADMIN — PRASUGREL 10 MG: 10 TABLET, FILM COATED ORAL at 08:17

## 2018-05-26 RX ADMIN — POTASSIUM CHLORIDE 40 MEQ: 20 TABLET, EXTENDED RELEASE ORAL at 12:59

## 2018-05-26 RX ADMIN — SERTRALINE HYDROCHLORIDE 100 MG: 100 TABLET, FILM COATED ORAL at 08:18

## 2018-05-26 RX ADMIN — LOSARTAN POTASSIUM 50 MG: 50 TABLET ORAL at 08:17

## 2018-05-26 RX ADMIN — ACETAMINOPHEN 650 MG: 325 TABLET ORAL at 08:15

## 2018-05-26 ASSESSMENT — PAIN SCALES - GENERAL
PAINLEVEL_OUTOF10: 3
PAINLEVEL_OUTOF10: 9
PAINLEVEL_OUTOF10: 9
PAINLEVEL_OUTOF10: 0

## 2018-05-26 ASSESSMENT — PAIN DESCRIPTION - PAIN TYPE: TYPE: ACUTE PAIN

## 2018-05-26 ASSESSMENT — PAIN DESCRIPTION - LOCATION: LOCATION: OTHER (COMMENT)

## 2018-05-27 ENCOUNTER — TELEPHONE (OUTPATIENT)
Dept: FAMILY MEDICINE CLINIC | Age: 73
End: 2018-05-27

## 2018-05-27 ENCOUNTER — TELEPHONE (OUTPATIENT)
Dept: PULMONOLOGY | Age: 73
End: 2018-05-27

## 2018-05-27 VITALS
HEIGHT: 72 IN | BODY MASS INDEX: 33.63 KG/M2 | DIASTOLIC BLOOD PRESSURE: 64 MMHG | SYSTOLIC BLOOD PRESSURE: 138 MMHG | OXYGEN SATURATION: 97 % | TEMPERATURE: 97.6 F | RESPIRATION RATE: 18 BRPM | WEIGHT: 248.3 LBS | HEART RATE: 59 BPM

## 2018-05-27 LAB
GLUCOSE BLD-MCNC: 266 MG/DL (ref 70–108)
GLUCOSE BLD-MCNC: 98 MG/DL (ref 70–108)
POTASSIUM SERPL-SCNC: 3.7 MEQ/L (ref 3.5–5.2)

## 2018-05-27 PROCEDURE — 99233 SBSQ HOSP IP/OBS HIGH 50: CPT | Performed by: INTERNAL MEDICINE

## 2018-05-27 PROCEDURE — 6370000000 HC RX 637 (ALT 250 FOR IP): Performed by: INTERNAL MEDICINE

## 2018-05-27 PROCEDURE — 2580000003 HC RX 258: Performed by: INTERNAL MEDICINE

## 2018-05-27 PROCEDURE — 84132 ASSAY OF SERUM POTASSIUM: CPT

## 2018-05-27 PROCEDURE — 82948 REAGENT STRIP/BLOOD GLUCOSE: CPT

## 2018-05-27 PROCEDURE — 36415 COLL VENOUS BLD VENIPUNCTURE: CPT

## 2018-05-27 RX ORDER — FLUTICASONE PROPIONATE 110 UG/1
2 AEROSOL, METERED RESPIRATORY (INHALATION) 2 TIMES DAILY
Qty: 1 INHALER | Refills: 11 | Status: SHIPPED | OUTPATIENT
Start: 2018-05-27 | End: 2019-04-24 | Stop reason: ALTCHOICE

## 2018-05-27 RX ORDER — FLUTICASONE PROPIONATE 110 UG/1
2 AEROSOL, METERED RESPIRATORY (INHALATION) 2 TIMES DAILY
Qty: 1 INHALER | Refills: 11 | Status: SHIPPED | OUTPATIENT
Start: 2018-05-27 | End: 2018-05-27

## 2018-05-27 RX ORDER — POTASSIUM CHLORIDE 750 MG/1
10 TABLET, EXTENDED RELEASE ORAL 2 TIMES DAILY
Qty: 60 TABLET | Refills: 3 | Status: ON HOLD | OUTPATIENT
Start: 2018-05-27 | End: 2020-01-01 | Stop reason: HOSPADM

## 2018-05-27 RX ORDER — OMEPRAZOLE 40 MG/1
40 CAPSULE, DELAYED RELEASE ORAL DAILY
Qty: 56 CAPSULE | Refills: 0 | Status: SHIPPED | OUTPATIENT
Start: 2018-05-27 | End: 2019-01-18 | Stop reason: CLARIF

## 2018-05-27 RX ADMIN — Medication 10 ML: at 07:59

## 2018-05-27 RX ADMIN — CALCITRIOL 0.25 MCG: 0.25 CAPSULE, LIQUID FILLED ORAL at 07:55

## 2018-05-27 RX ADMIN — ASPIRIN 81 MG: 81 TABLET ORAL at 07:54

## 2018-05-27 RX ADMIN — LOSARTAN POTASSIUM 50 MG: 50 TABLET ORAL at 07:56

## 2018-05-27 RX ADMIN — Medication 1 TABLET: at 07:57

## 2018-05-27 RX ADMIN — SERTRALINE HYDROCHLORIDE 100 MG: 100 TABLET, FILM COATED ORAL at 07:58

## 2018-05-27 RX ADMIN — BUMETANIDE 2 MG: 1 TABLET ORAL at 09:54

## 2018-05-27 RX ADMIN — PRASUGREL 10 MG: 10 TABLET, FILM COATED ORAL at 07:57

## 2018-05-27 RX ADMIN — FLUTICASONE PROPIONATE 2 SPRAY: 50 SPRAY, METERED NASAL at 07:55

## 2018-05-27 RX ADMIN — PANTOPRAZOLE SODIUM 40 MG: 40 TABLET, DELAYED RELEASE ORAL at 06:30

## 2018-05-27 RX ADMIN — LEVOTHYROXINE SODIUM 25 MCG: 0.03 TABLET ORAL at 05:01

## 2018-05-27 RX ADMIN — GUAIFENESIN 600 MG: 600 TABLET, EXTENDED RELEASE ORAL at 07:55

## 2018-05-29 ENCOUNTER — CARE COORDINATION (OUTPATIENT)
Dept: OTHER | Facility: CLINIC | Age: 73
End: 2018-05-29

## 2018-05-29 ENCOUNTER — OFFICE VISIT (OUTPATIENT)
Dept: FAMILY MEDICINE CLINIC | Age: 73
End: 2018-05-29
Payer: MEDICARE

## 2018-05-29 ENCOUNTER — TELEPHONE (OUTPATIENT)
Dept: PHARMACY | Facility: CLINIC | Age: 73
End: 2018-05-29

## 2018-05-29 ENCOUNTER — CARE COORDINATION (OUTPATIENT)
Dept: CARE COORDINATION | Age: 73
End: 2018-05-29

## 2018-05-29 VITALS
SYSTOLIC BLOOD PRESSURE: 126 MMHG | RESPIRATION RATE: 18 BRPM | DIASTOLIC BLOOD PRESSURE: 77 MMHG | OXYGEN SATURATION: 96 % | TEMPERATURE: 98.5 F

## 2018-05-29 DIAGNOSIS — J18.9 PNEUMONIA OF BOTH LUNGS DUE TO INFECTIOUS ORGANISM, UNSPECIFIED PART OF LUNG: Primary | ICD-10-CM

## 2018-05-29 DIAGNOSIS — H92.01 ACUTE PAIN OF RIGHT EAR: ICD-10-CM

## 2018-05-29 PROCEDURE — 99214 OFFICE O/P EST MOD 30 MIN: CPT | Performed by: NURSE PRACTITIONER

## 2018-05-29 RX ORDER — ONDANSETRON HYDROCHLORIDE 8 MG/1
8 TABLET, FILM COATED ORAL EVERY 8 HOURS PRN
Qty: 30 TABLET | Refills: 0 | Status: SHIPPED | OUTPATIENT
Start: 2018-05-29 | End: 2018-06-08

## 2018-05-29 RX ORDER — AMOXICILLIN AND CLAVULANATE POTASSIUM 875; 125 MG/1; MG/1
1 TABLET, FILM COATED ORAL 2 TIMES DAILY
Qty: 20 TABLET | Refills: 0 | Status: SHIPPED | OUTPATIENT
Start: 2018-05-29 | End: 2018-06-08

## 2018-05-29 RX ORDER — CIPROFLOXACIN AND DEXAMETHASONE 3; 1 MG/ML; MG/ML
4 SUSPENSION/ DROPS AURICULAR (OTIC) 2 TIMES DAILY
Qty: 1 BOTTLE | Refills: 0 | Status: SHIPPED | OUTPATIENT
Start: 2018-05-29 | End: 2018-06-05

## 2018-05-29 ASSESSMENT — ENCOUNTER SYMPTOMS
VOMITING: 1
SHORTNESS OF BREATH: 1
COLOR CHANGE: 0
COUGH: 1
NAUSEA: 1
ABDOMINAL DISTENTION: 0
WHEEZING: 1
SORE THROAT: 0
ANAL BLEEDING: 0
CONSTIPATION: 0
BLOOD IN STOOL: 0
EYE DISCHARGE: 0
EYE REDNESS: 0
ABDOMINAL PAIN: 0
RHINORRHEA: 0
DIARRHEA: 0

## 2018-05-30 ENCOUNTER — TELEPHONE (OUTPATIENT)
Dept: FAMILY MEDICINE CLINIC | Age: 73
End: 2018-05-30

## 2018-05-30 PROCEDURE — 96372 THER/PROPH/DIAG INJ SC/IM: CPT | Performed by: NURSE PRACTITIONER

## 2018-05-30 RX ORDER — CEFTRIAXONE SODIUM 250 MG/1
250 INJECTION, POWDER, FOR SOLUTION INTRAMUSCULAR; INTRAVENOUS ONCE
Status: COMPLETED | OUTPATIENT
Start: 2018-05-30 | End: 2018-05-30

## 2018-05-30 RX ADMIN — CEFTRIAXONE SODIUM 250 MG: 250 INJECTION, POWDER, FOR SOLUTION INTRAMUSCULAR; INTRAVENOUS at 13:46

## 2018-06-06 ENCOUNTER — OFFICE VISIT (OUTPATIENT)
Dept: FAMILY MEDICINE CLINIC | Age: 73
End: 2018-06-06
Payer: MEDICARE

## 2018-06-06 VITALS
BODY MASS INDEX: 33.46 KG/M2 | SYSTOLIC BLOOD PRESSURE: 125 MMHG | DIASTOLIC BLOOD PRESSURE: 59 MMHG | TEMPERATURE: 97.8 F | OXYGEN SATURATION: 98 % | WEIGHT: 247 LBS | HEART RATE: 70 BPM | RESPIRATION RATE: 16 BRPM | HEIGHT: 72 IN

## 2018-06-06 DIAGNOSIS — I50.41 ACUTE COMBINED SYSTOLIC AND DIASTOLIC HEART FAILURE (HCC): Primary | ICD-10-CM

## 2018-06-06 DIAGNOSIS — J18.9 PNEUMONIA OF BOTH LUNGS DUE TO INFECTIOUS ORGANISM, UNSPECIFIED PART OF LUNG: ICD-10-CM

## 2018-06-06 PROCEDURE — 99495 TRANSJ CARE MGMT MOD F2F 14D: CPT | Performed by: FAMILY MEDICINE

## 2018-06-10 DIAGNOSIS — E78.5 HYPERLIPIDEMIA, UNSPECIFIED HYPERLIPIDEMIA TYPE: ICD-10-CM

## 2018-06-11 ENCOUNTER — CARE COORDINATION (OUTPATIENT)
Dept: CARE COORDINATION | Age: 73
End: 2018-06-11

## 2018-06-11 RX ORDER — ATORVASTATIN CALCIUM 80 MG/1
TABLET, FILM COATED ORAL
Qty: 90 TABLET | Refills: 1 | Status: SHIPPED | OUTPATIENT
Start: 2018-06-11 | End: 2018-12-08 | Stop reason: SDUPTHER

## 2018-06-11 ASSESSMENT — ENCOUNTER SYMPTOMS: DYSPNEA ASSOCIATED WITH: EXERTION

## 2018-06-25 ENCOUNTER — TELEPHONE (OUTPATIENT)
Dept: FAMILY MEDICINE CLINIC | Age: 73
End: 2018-06-25

## 2018-06-25 NOTE — TELEPHONE ENCOUNTER
Pt's wife called stating he has pain in his stomach and something protruding. He had been doing some work around the house. There is some relief in the pain when he pushes in on that area. I mentioned that [facility] is out this week. The pt doesn't want to see Laurent Choe. I advised his wife to take him to an urgent care or ED. She states that he is very stubborn. I advised her that I would let [facility] know and I would contact them tomorrow. I advised her again to try to encourage him to go to an UC or ED. She voiced understanding.

## 2018-07-09 LAB — PROSTATE SPECIFIC ANTIGEN: <0.01 NG/ML

## 2018-07-10 ENCOUNTER — CARE COORDINATION (OUTPATIENT)
Dept: CARE COORDINATION | Age: 73
End: 2018-07-10

## 2018-07-10 NOTE — CARE COORDINATION
Ambulatory Care Coordination Note  7/10/2018  CM Risk Score: 8  Sherrill Mortality Risk Score: 20.16    ACC: Niesha Amos RN    Summary Note: Spoke with wife and Rosendo Kanner. Reports ongoing issue with hernia. States he pushes it back in. Pain is not as bad. Had to cancel appointment with PCP as wife was admitted. Scheduled tomorrow after wife's appointment. Overall doing well. Unable to complete med rec. Has contact information. Encouraged to call with any problems, questions, concerns. Care Coordination Interventions    Program Enrollment:  Complex Care  Referral from Primary Care Provider:  No  Suggested Interventions and Community Resources  Diabetes Education:  Completed  Zone Management Tools: In Process         Goals Addressed             Most Recent     Conditions and Symptoms   On track (7/10/2018)             I will schedule office visits, as directed by my provider. I will keep my appointment or reschedule if I have to cancel. I will notify my provider of any barriers to my plan of care. I will follow my Zone Management tool to seek urgent or emergent care. I will notify my provider of any symptoms that indicate a worsening of my condition. Barriers: none  Plan for overcoming my barriers: N/A  Confidence: 8/10  Anticipated Goal Completion Date: 8/15/17              Prior to Admission medications    Medication Sig Start Date End Date Taking?  Authorizing Provider   atorvastatin (LIPITOR) 80 MG tablet TAKE 1 TABLET DAILY 6/11/18   Emigdio Lancaster MD   omeprazole (PRILOSEC) 40 MG delayed release capsule Take 1 capsule by mouth daily Diagnosis: GERD. 5/27/18 7/22/18  Johanny Oshea MD   potassium chloride (KLOR-CON M) 10 MEQ extended release tablet Take 1 tablet by mouth 2 times daily 5/27/18   Erinn Kraft MD   fluticasone (FLOVENT HFA) 110 MCG/ACT inhaler Inhale 2 puffs into the lungs 2 times daily Rinse mouth after its use. 5/27/18 5/27/19  Johanny Oshea MD hydrALAZINE (APRESOLINE) 50 MG tablet Take 50 mg by mouth 3 times daily as needed Take for systolic BP greater than 903    Historical Provider, MD   insulin glargine (LANTUS) 100 UNIT/ML injection vial Inject 36 Units into the skin nightly    Historical Provider, MD   insulin glargine (LANTUS SOLOSTAR) 100 UNIT/ML injection pen Inject SQ 34 u in am, 32 U in pm  Patient taking differently: Inject SQ 34 u in am, 36 U in pm 4/27/18   Margarita Melchor MD   atenolol (TENORMIN) 50 MG tablet Take 1 tablet by mouth daily 4/9/18   Margarita Melchor MD   VICTOZA 18 MG/3ML SOPN SC injection INJECT 1.8 MG INTO THE SKIN DAILY 3/29/18   Margarita Melchor MD   levothyroxine (SYNTHROID) 50 MCG tablet TAKE 1 TABLET MONDAY THROUGH SATURDAY THEN TAKE ONE AND ONE-HALF TABLETS ON SUNDAYS 3/28/18   Margarita Melchor MD   nitroGLYCERIN (NITROLINGUAL) 0.4 MG/SPRAY 0.4 mg spray Place 1 spray under the tongue as needed 12/28/17   Historical Provider, MD   aspirin 81 MG EC tablet Take 81 mg by mouth daily    Historical Provider, MD   sertraline (ZOLOFT) 100 MG tablet TAKE 1 TABLET DAILY 1/18/18   ZHANNA Dawkins CNP   calcitRIOL (ROCALTROL) 0.25 MCG capsule Take 0.25 mcg by mouth every other day     Historical Provider, MD   HUMALOG KWIKPEN 100 UNIT/ML pen INJECT PER SLIDING SCALE THREE TIMES A DAY BEFORE MEALS (MAX OF 80 UNITS DAILY) 12/18/17   ZHANNA Dawkins CNP   fluticasone (FLONASE) 50 MCG/ACT nasal spray 1 spray by Nasal route daily  Patient taking differently: 1 spray by Nasal route daily as needed  10/16/17   Margarita Melchor MD   bumetanide (BUMEX) 2 MG tablet Take 1 tablet by mouth daily 5/1/17   Historical Provider, MD   EFFIENT 10 MG TABS Take 1 tablet by mouth daily 11/11/16   Historical Provider, MD   losartan (COZAAR) 50 MG tablet Take 1 tablet by mouth daily 9/19/16   Margarita Melchor MD   CPAP Machine MISC by Does not apply route Please change CPAP pressure to 14 cm H20. 8/22/16   Nani Chambers PA-C   albuterol sulfate HFA (PROVENTIL HFA) 108 (90 BASE) MCG/ACT inhaler Inhale 2 puffs into the lungs every 6 hours as needed for Wheezing 8/22/16   Beatriz Pruett PA-C   glucose blood VI test strips (ACCU-CHEK TERESA PLUS) strip Check BS 4 times a day 4/19/16   Nikole Villalobos MD   B-D ULTRAFINE III SHORT PEN 31G X 8 MM MISC USE TO INJECT INSULIN UNDER THE SKIN FOUR TIMES A DAY 8/20/15   [facility], APRN - CNS   Acetaminophen 650 MG TABS Take 1,000 mg by mouth every 4 hours as needed for Pain  6/1/15   Autumn Cotto MD   glucagon 1 MG injection Inject 1 mg into the skin See Admin Instructions. Follow package directions for low blood sugar.  10/11/12   Nikole Villalobos MD   Multiple Vitamin (MULTI-VITAMIN) TABS   Take 1 tablet by mouth daily     Historical Provider, MD       Future Appointments  Date Time Provider Dean Tatiana   7/11/2018 10:00 AM [address] [facility] - [address] [facility]   7/11/2018 1:30 PM Mago Quick MD [facility] - [address] [facility]   7/17/2018 3:30 PM Nicki Silva

## 2018-07-11 ENCOUNTER — OFFICE VISIT (OUTPATIENT)
Dept: FAMILY MEDICINE CLINIC | Age: 73
End: 2018-07-11
Payer: MEDICARE

## 2018-07-11 ENCOUNTER — OFFICE VISIT (OUTPATIENT)
Dept: UROLOGY | Age: 73
End: 2018-07-11
Payer: MEDICARE

## 2018-07-11 ENCOUNTER — CARE COORDINATION (OUTPATIENT)
Dept: CARE COORDINATION | Age: 73
End: 2018-07-11

## 2018-07-11 VITALS
SYSTOLIC BLOOD PRESSURE: 122 MMHG | BODY MASS INDEX: 35.3 KG/M2 | WEIGHT: 260.6 LBS | HEIGHT: 72 IN | DIASTOLIC BLOOD PRESSURE: 84 MMHG

## 2018-07-11 VITALS
DIASTOLIC BLOOD PRESSURE: 63 MMHG | HEART RATE: 67 BPM | BODY MASS INDEX: 35.21 KG/M2 | WEIGHT: 260 LBS | TEMPERATURE: 97.6 F | HEIGHT: 72 IN | SYSTOLIC BLOOD PRESSURE: 153 MMHG

## 2018-07-11 DIAGNOSIS — C61 PROSTATE CANCER (HCC): Primary | ICD-10-CM

## 2018-07-11 DIAGNOSIS — K42.9 UMBILICAL HERNIA WITHOUT OBSTRUCTION AND WITHOUT GANGRENE: Primary | ICD-10-CM

## 2018-07-11 DIAGNOSIS — R60.0 BILATERAL LOWER EXTREMITY EDEMA: ICD-10-CM

## 2018-07-11 PROCEDURE — 81003 URINALYSIS AUTO W/O SCOPE: CPT | Performed by: NURSE PRACTITIONER

## 2018-07-11 PROCEDURE — 99213 OFFICE O/P EST LOW 20 MIN: CPT | Performed by: NURSE PRACTITIONER

## 2018-07-11 PROCEDURE — 99213 OFFICE O/P EST LOW 20 MIN: CPT | Performed by: FAMILY MEDICINE

## 2018-07-11 RX ORDER — BUMETANIDE 2 MG/1
2 TABLET ORAL DAILY
Qty: 30 TABLET | Status: CANCELLED | OUTPATIENT
Start: 2018-07-11

## 2018-07-11 RX ORDER — BUMETANIDE 1 MG/1
1 TABLET ORAL DAILY
Qty: 30 TABLET | Refills: 0 | Status: ON HOLD | OUTPATIENT
Start: 2018-07-11 | End: 2019-04-17

## 2018-07-11 RX ORDER — BUMETANIDE 1 MG/1
1 TABLET ORAL DAILY
Qty: 30 TABLET | Refills: 0 | Status: SHIPPED | OUTPATIENT
Start: 2018-07-11 | End: 2018-07-11 | Stop reason: CLARIF

## 2018-07-11 NOTE — PROGRESS NOTES
service    Hyperlipidemia 1989    Hypertension 1999    Hypothyroidism 2012    Peripheral vascular disease ([facility] 1999    Pneumonia     Prostate cancer ([facility] 2016    Retinopathy due to secondary diabetes ([facility]     Tobacco abuse     Type II or unspecified type diabetes mellitus without mention of complication, not stated as uncontrolled 1985    Vision blurred     rt eye    Vitreous hemorrhage of right eye ([facility]     Wears glasses            Allergies:  is allergic to tape [adhesive tape]. Medications:   Current Outpatient Prescriptions   Medication Sig Dispense Refill    bumetanide (BUMEX) 1 MG tablet Take 1 tablet by mouth daily 30 tablet 0    atorvastatin (LIPITOR) 80 MG tablet TAKE 1 TABLET DAILY 90 tablet 1    omeprazole (PRILOSEC) 40 MG delayed release capsule Take 1 capsule by mouth daily Diagnosis: GERD. 56 capsule 0    potassium chloride (KLOR-CON M) 10 MEQ extended release tablet Take 1 tablet by mouth 2 times daily 60 tablet 3    fluticasone (FLOVENT HFA) 110 MCG/ACT inhaler Inhale 2 puffs into the lungs 2 times daily Rinse mouth after its use.  1 Inhaler 11    hydrALAZINE (APRESOLINE) 50 MG tablet Take 50 mg by mouth 3 times daily as needed Take for systolic BP greater than 531      insulin glargine (LANTUS) 100 UNIT/ML injection vial Inject 36 Units into the skin nightly      insulin glargine (LANTUS SOLOSTAR) 100 UNIT/ML injection pen Inject SQ 34 u in am, 32 U in pm (Patient taking differently: Inject SQ 34 u in am, 36 U in pm) 60 mL 1    atenolol (TENORMIN) 50 MG tablet Take 1 tablet by mouth daily 90 tablet 1    VICTOZA 18 MG/3ML SOPN SC injection INJECT 1.8 MG INTO THE SKIN DAILY 27 mL 2    levothyroxine (SYNTHROID) 50 MCG tablet TAKE 1 TABLET MONDAY THROUGH SATURDAY THEN TAKE ONE AND ONE-HALF TABLETS ON SUNDAYS 90 tablet 1    nitroGLYCERIN (NITROLINGUAL) 0.4 MG/SPRAY 0.4 mg spray Place 1 spray under the tongue as needed  0    aspirin 81 MG EC tablet Take 81 mg by mouth daily  sertraline (ZOLOFT) 100 MG tablet TAKE 1 TABLET DAILY 90 tablet 1    calcitRIOL (ROCALTROL) 0.25 MCG capsule Take 0.25 mcg by mouth every other day       HUMALOG KWIKPEN 100 UNIT/ML pen INJECT PER SLIDING SCALE THREE TIMES A DAY BEFORE MEALS (MAX OF 80 UNITS DAILY) 60 mL 1    fluticasone (FLONASE) 50 MCG/ACT nasal spray 1 spray by Nasal route daily (Patient taking differently: 1 spray by Nasal route daily as needed ) 1 Bottle 3    bumetanide (BUMEX) 2 MG tablet Take 1 tablet by mouth daily      EFFIENT 10 MG TABS Take 1 tablet by mouth daily      losartan (COZAAR) 50 MG tablet Take 1 tablet by mouth daily 90 tablet 1    CPAP Machine MISC by Does not apply route Please change CPAP pressure to 14 cm H20. 1 each 0    albuterol sulfate HFA (PROVENTIL HFA) 108 (90 BASE) MCG/ACT inhaler Inhale 2 puffs into the lungs every 6 hours as needed for Wheezing 1 Inhaler 3    glucose blood VI test strips (ACCU-CHEK TERESA PLUS) strip Check BS 4 times a day 400 each 1    B-D ULTRAFINE III SHORT PEN 31G X 8 MM MISC USE TO INJECT INSULIN UNDER THE SKIN FOUR TIMES A  each 1    Acetaminophen 650 MG TABS Take 1,000 mg by mouth every 4 hours as needed for Pain  120 tablet 3    glucagon 1 MG injection Inject 1 mg into the skin See Admin Instructions. Follow package directions for low blood sugar. 1 kit 3    Multiple Vitamin (MULTI-VITAMIN) TABS   Take 1 tablet by mouth daily        No current facility-administered medications for this visit. Review of systems:  Review of Systems - History obtained from chart review and the patient  General ROS: negative for - chills, fatigue or fever  Respiratory ROS: negative for - cough,  shortness of breath or wheezing  Cardiovascular ROS: negative for - chest pain.   Positive for edema  Gastrointestinal ROS: negative for - abdominal pain, constipation, diarrhea or nausea/vomiting      Physical Examination:  BP (!) 153/63 (Site: Right Arm, Position: Sitting, Cuff Size: Large Adult)   Pulse 67   Temp 97.6 °F (36.4 °C) (Oral)   Ht 6' (1.829 m)   Wt 260 lb (117.9 kg)   BMI 35.26 kg/m²     General-  Alert and oriented x 3, NAD  Mouth: no lesions, moist mucosas  Neck - supple, no significant adenopathy  Chest - clear to auscultation, no wheezes, rales or rhonchi, symmetric air entry  Heart - normal rate, regular rhythm, normal S1, S2, no murmurs, rubs, clicks or gallops  Abdomen - soft, nontender,  no masses or organomegaly. Umbilical hernia present, reducible  Extremities - 2+ pedal edema, no clubbing or cyanosis    Impression:   Diagnosis Orders   1. Umbilical hernia without obstruction and without gangrene  CT ABDOMEN WO CONTRAST Additional Contrast? None    Ren Lua MD   2. Bilateral lower extremity edema         Plan:  Orders Placed This Encounter   Procedures    CT ABDOMEN WO CONTRAST Additional Contrast? None     Standing Status:   Future     Standing Expiration Date:   7/11/2019     Order Specific Question:   Additional Contrast?     Answer:   None   Ren Lua MD     Referral Priority:   Routine     Referral Type:   Eval and Treat     Referral Reason:   Specialty Services Required     Referred to Provider:   Ben Gutierrez MD     Requested Specialty:   General Surgery     Number of Visits Requested:   1     Orders Placed This Encounter   Medications    bumetanide (BUMEX) 1 MG tablet     Sig: Take 1 tablet by mouth daily     Dispense:  30 tablet     Refill:  0     Add 1 mg Bumex for 4-5 days  Keep appointment with Dr. Vincent Lemon  Will do CT abdomen and refer to Dr. Antonio Malone for an opinion    Follow Up:  Return in about 3 months (around 10/11/2018).     Kaleb Su MD

## 2018-07-11 NOTE — CARE COORDINATION
Met with Satnam during PCP appointment. Wife present. Here for hernia changes. States pain isn't as severe as a couple weeks ago but keeps pushing it back in. Concerned with hernia. Has lower leg edema. States resolves some overnight but worse than usual.  Thinks he needs water pill increased. States weight was 241 while in hospital and 260 today. Doesn't weigh self daily. Discussed importance of monitoring daily weights and reporting 2-3 pounds overnight or 5 pounds in one week. Denies chest pain or unusual SOB. Discussed early symptom recognition and reporting to prevent ED and hospital.  Encouraged to call with any problems, questions, concerns.

## 2018-07-11 NOTE — PROGRESS NOTES
07/11/18 1007   BP: 122/84   Weight: 260 lb 9.6 oz (118.2 kg)   Height: 6' (1.829 m)     Nursing note and vitals reviewed. Constitutional: Alert and oriented times 3, no acute distress and cooperative to examination with appropriate mood and affect. HENT:   Head:        Normocephalic and atraumatic. Mouth/Throat:         Mucous membranes are normal.   Eyes:         EOM are normal. No scleral icterus. PERRLA. Neck:        Supple, symmetrical, trachea midline, no adenopathy, thyroid symmetric, not enlarged and no tenderness. Cardiovascular:        Normal rate, regular rhythm, S1 S2 heart sounds. No murmurs, rub, or gallops. Pulses:       Radial pulses are 2+/4 bilateral and equal. Posterior tibialis 2+/4 bilateral and equal  Pulmonary/Chest:      Chest symmetric with normal A/P diameter,  Diminished  Bases, + exp wheeze. Occasional nonproductive cough. Abdominal:         Soft. No tenderness. No rebound, no guarding and no CVA tenderness. Bowel sounds present. Musculoskeletal:         Normal range of motion. No edema or tenderness of lower extremities. Extremities: No cyanosis, clubbing.  + bilateral lower extremity 1+ edema  Neurological:        Alert and oriented. No cranial nerve deficit. There are no focalizing motor or sensory deficits. CN II-XII are grossly intact. Skin:       Skin color, texture, turgor normal. No rashes or lesions. Psychiatric:        Normal mood and affect.     Labs   Urine dip demonstrates   Results for POC orders placed in visit on 07/11/18   POCT Urinalysis No Micro (Auto)   Result Value Ref Range    Glucose, Ur Negative NEGATIVE mg/dl    Bilirubin Urine Negative     Ketones, Urine Negative NEGATIVE    Specific Gravity, Urine 1.015 1.002 - 1.03    Blood, UA POC Trace-lysed NEGATIVE    pH, Urine 5.00 5.0 - 9.0    Protein, Urine 100 (A) NEGATIVE mg/dl    Urobilinogen, Urine 0.20 0.0 - 1.0 eu/dl    Nitrite, Urine Negative NEGATIVE    Leukocyte Clumps, Urine Negative NEGATIVE    Color, Urine Yellow YELLOW-STR    Character, Urine Clear [id number]       Surgical Pathology  FINAL DIAGNOSIS:  A.  Lymph nodes, right pelvic, dissection:   No evidence of malignancy. Enrique Hock, radical prostatectomy:   Invasive prostatic adenocarcinoma.   Jai score 3+4 = 7 (grade group 2).  Tumor volume: 15%.   Margins: Negative for malignancy.   Extraprostatic extension: Not identified.   Perineural invasion: Present.   Pathologic stage: pT2c, pN0. Assessment & Plan  Prostate Cancer    Pt's PSA <.01 showing continued excellent prostate cancer control at this time. Pt is doing well. Next PSA in 1 year. Follow up in 1 year with PSA prior to appt.

## 2018-07-17 ENCOUNTER — OFFICE VISIT (OUTPATIENT)
Dept: PULMONOLOGY | Age: 73
End: 2018-07-17
Payer: MEDICARE

## 2018-07-17 VITALS
SYSTOLIC BLOOD PRESSURE: 126 MMHG | DIASTOLIC BLOOD PRESSURE: 74 MMHG | BODY MASS INDEX: 34.84 KG/M2 | HEIGHT: 72 IN | OXYGEN SATURATION: 98 % | WEIGHT: 257.2 LBS | HEART RATE: 69 BPM

## 2018-07-17 DIAGNOSIS — G47.33 OSA (OBSTRUCTIVE SLEEP APNEA): Primary | ICD-10-CM

## 2018-07-17 PROCEDURE — 99213 OFFICE O/P EST LOW 20 MIN: CPT | Performed by: INTERNAL MEDICINE

## 2018-07-17 NOTE — PROGRESS NOTES
Sleep Medicine Follow Up  Sina Jose MD    Denny Flynn, 67 y.o.  [id number]     Nurses Notes Yordan Richards is here for a 6 week f/u with a download    Date of Last Visit  5-21-18      Scan of Download      Summary of PAP Download     Dates  6-12-18 -   7-11-18  Four Hour Compliance - 90 %. Total Hours -    Average Hours/Day -  7 hrs 42 min       AHI -  9.1    Original AHI -  62.9     Initial Study Date -  12-27-15  PAP Type -  C PAP     Machine Type - ResMed    Pressure Settings -  14 cmH2O cwp  Interface -  FFM    Provider  [x][facility]  [][employer]  [][employer]  [][facility]         [][facility] []Other:  ESS: 14   Neck Circumference:    20 In    INTERVAL HISTORY         Denny Flynn is a 67 y.o. old male who comes in for follow up regarding his obstructive sleep apnea.  Excellent compliance but AHI is elevated, detail D/L reviewed up to index up to 22---mainly obstructive hypopneas  Large leakage though FFM    Past Medical History:   Diagnosis Date    Angina pectoris ([address]     Blood circulation, collateral     CAD (coronary artery disease) 1989    Status post bypass, Status post stents    Cancer ([address] 2939,5259    Melanoma x2     Cancer ([address] 05/2016    Prostate     Carotid artery disease ([address] 2008    moderate    Cerebral artery occlusion with cerebral infarction ([address]     CHF (congestive heart failure) ([address] 1985    CKD (chronic kidney disease), stage III 2012    has lost 13% more of kidney function 2016    Detached retina     Diabetes mellitus ([address]     Diabetic nephropathy/sclerosis ([address] 2012    Diverticula of colon 2006    GERD (gastroesophageal reflux disease)     Hearing loss     does not wear aides    History of blood transfusion     during open heart ? and During service    Hyperlipidemia 1989    Hypertension 1999    Hypothyroidism 2012    Peripheral vascular disease ([address] 1999    Pneumonia     Prostate cancer ([address] 2016    Retinopathy due to secondary diabetes ([address]     Tobacco abuse     mouth daily      EFFIENT 10 MG TABS Take 1 tablet by mouth daily      losartan (COZAAR) 50 MG tablet Take 1 tablet by mouth daily 90 tablet 1    CPAP Machine MISC by Does not apply route Please change CPAP pressure to 14 cm H20. 1 each 0    albuterol sulfate HFA (PROVENTIL HFA) 108 (90 BASE) MCG/ACT inhaler Inhale 2 puffs into the lungs every 6 hours as needed for Wheezing 1 Inhaler 3    glucose blood VI test strips (ACCU-CHEK TERESA PLUS) strip Check BS 4 times a day 400 each 1    B-D ULTRAFINE III SHORT PEN 31G X 8 MM MISC USE TO INJECT INSULIN UNDER THE SKIN FOUR TIMES A  each 1    Acetaminophen 650 MG TABS Take 1,000 mg by mouth every 4 hours as needed for Pain  120 tablet 3    glucagon 1 MG injection Inject 1 mg into the skin See Admin Instructions. Follow package directions for low blood sugar. 1 kit 3    Multiple Vitamin (MULTI-VITAMIN) TABS   Take 1 tablet by mouth daily        No current facility-administered medications for this visit. Family History   Problem Relation Age of Onset    Heart Disease Mother 43    Heart Attack Mother 43    Coronary Art Dis Mother 43    Colon Cancer Father     Lung Cancer Father     Heart Surgery Brother 64    Coronary Art Dis Brother 64        MI    Heart Disease Brother 64    Heart Disease Maternal Aunt     Coronary Art Dis Maternal Aunt     Heart Attack Maternal Aunt     Heart Disease Maternal Uncle     Coronary Art Dis Maternal Uncle     Heart Attack Maternal Uncle     Cancer Paternal Aunt         unknown     Cancer Paternal Uncle         unknown         EXAM  Vitals -  /74 (Site: Left Arm, Position: Sitting, Cuff Size: Medium Adult)   Pulse 69   Ht 6' (1.829 m)   Wt 257 lb 3.2 oz (116.7 kg)   SpO2 98%   BMI 34.88 kg/m²    Body mass index is 34.88 kg/m². Oxygen level - RA        Constitutional - BMI 34  Head  - Normocephalic and atraumatic.    Mouth/Throat - Oropharynx is clear and moist.   Dentition - good  Mallampati Score -

## 2018-07-18 ENCOUNTER — HOSPITAL ENCOUNTER (OUTPATIENT)
Dept: CT IMAGING | Age: 73
Discharge: HOME | End: 2018-07-18
Payer: MEDICARE

## 2018-07-18 DIAGNOSIS — K42.9 UMBILICAL HERNIA WITHOUT OBSTRUCTION AND WITHOUT GANGRENE: ICD-10-CM

## 2018-07-18 PROCEDURE — 74150 CT ABDOMEN W/O CONTRAST: CPT

## 2018-07-20 ENCOUNTER — TELEPHONE (OUTPATIENT)
Dept: FAMILY MEDICINE CLINIC | Age: 73
End: 2018-07-20

## 2018-07-24 RX ORDER — SERTRALINE HYDROCHLORIDE 100 MG/1
TABLET, FILM COATED ORAL
Qty: 90 TABLET | Refills: 1 | Status: SHIPPED | OUTPATIENT
Start: 2018-07-24 | End: 2019-01-18 | Stop reason: SDUPTHER

## 2018-07-25 NOTE — TELEPHONE ENCOUNTER
Pt called and was given results. He states he is unaware of any back/spine issues. Please advise what this means. Pt is having some confusion. Pt would like a call back.

## 2018-08-06 ENCOUNTER — OFFICE VISIT (OUTPATIENT)
Dept: SURGERY | Age: 73
End: 2018-08-06
Payer: MEDICARE

## 2018-08-06 VITALS
WEIGHT: 256.9 LBS | HEART RATE: 76 BPM | BODY MASS INDEX: 34.8 KG/M2 | DIASTOLIC BLOOD PRESSURE: 66 MMHG | OXYGEN SATURATION: 90 % | SYSTOLIC BLOOD PRESSURE: 130 MMHG | RESPIRATION RATE: 18 BRPM | HEIGHT: 72 IN | TEMPERATURE: 98 F

## 2018-08-06 DIAGNOSIS — E11.65 TYPE 2 DIABETES MELLITUS WITH HYPERGLYCEMIA, UNSPECIFIED WHETHER LONG TERM INSULIN USE (HCC): ICD-10-CM

## 2018-08-06 DIAGNOSIS — N18.9 CHRONIC RENAL IMPAIRMENT, UNSPECIFIED CKD STAGE: ICD-10-CM

## 2018-08-06 DIAGNOSIS — E66.9 OBESITY (BMI 30.0-34.9): ICD-10-CM

## 2018-08-06 DIAGNOSIS — I25.10 CAD IN NATIVE ARTERY: ICD-10-CM

## 2018-08-06 DIAGNOSIS — K42.9 UMBILICAL HERNIA WITHOUT OBSTRUCTION AND WITHOUT GANGRENE: Primary | ICD-10-CM

## 2018-08-06 PROCEDURE — 99203 OFFICE O/P NEW LOW 30 MIN: CPT | Performed by: SURGERY

## 2018-08-06 NOTE — PROGRESS NOTES
Katrina Sadler MD   General Surgery  New Patient Evaluation in Office  Pt Name: Jessy Lujan  Date of Birth 1945   Today's Date: 8/6/2018  Medical Record Number: [id number]  Referring Provider: Jazmin Amor MD  Primary Care Provider: Samara Paul MD  Chief Complaint:  Chief Complaint   Patient presents with   [facility] Surgical Consult     new patient--ref by Dr. Venus Wall for periumbilical hernia       ASSESSMENT      1. Umbilical hernia without obstruction and without gangrene    2. CAD in native artery    3. Type 2 diabetes mellitus with hyperglycemia, unspecified whether long term insulin use (HCC)    4. Obesity (BMI 30.0-34.9)    5. Chronic renal impairment, unspecified CKD stage         PLANS      1. Query Dr. Susanne Gibson office when the patient can go off anticoagulation to repair his umbilical hernia. I suspect this won't be until 2019. Drug-eluting stents placed earlier this year  2. Recommend abdominal binder when up. 3.  Counseled patient on techniques of reducing hernia when, and if, it becomes incarcerated. 4.  Recheck in surgical clinic in 4 months  5. Patient instructed to call for increasing symptoms related to his hernia. Signs of incarceration and obstruction were discussed as well. Maira Vazquez is a 75y.o. year old male who is presenting today in the office for evaluation of an umbilical hernia. He has a history of multiple medical issues coronary artery disease he's had multiple cardiac stents as well as a remote coronary artery bypass graft. Most recent stents were placed earlier this year, drug-eluting by Dr. Hodan Meier. He states he has had hernia in his mid abdomen for well over a year. He had prior laparoscopic surgery and a port was at that location. It did recently become incarcerated but he was able to reduce it himself with gentle pressure. He has no pain over the hernia to palpation at the present time.   He also has a history of chronic atrial fibrillation and has a permanent pacemaker. Prior abdominal surgeries include an appendectomy and a laparoscopic cholecystectomy remotely. He's had no recent change in bowel or urinary habits. Recent CT scan showed small ventral hernia and the area of the umbilicus. It contained fat no bowel content. Past Medical History  Past Medical History:   Diagnosis Date    Angina pectoris ([address]     Blood circulation, collateral     CAD (coronary artery disease) 1989    Status post bypass, Status post stents    Cancer ([facility] [address] 0194,9650    Melanoma x2     Cancer ([address] 05/2016    Prostate     Carotid artery disease ([address] 2008    moderate    Cerebral artery occlusion with cerebral infarction ([address]     CHF (congestive heart failure) ([address] 1985    CKD (chronic kidney disease), stage III 2012    has lost 13% more of kidney function 2016    Detached retina     Diabetes mellitus ([address]     Diabetic nephropathy/sclerosis ([address] 2012    Diverticula of colon 2006    GERD (gastroesophageal reflux disease)     Hearing loss     does not wear aides    History of blood transfusion     during open heart ? and During service    Hyperlipidemia 1989    Hypertension 1999    Hypothyroidism 2012    Peripheral vascular disease ([address] 1999    Pneumonia     Prostate cancer ([address] 2016    Retinopathy due to secondary diabetes ([address]     Tobacco abuse     Type II or unspecified type diabetes mellitus without mention of complication, not stated as uncontrolled 1985    Vision blurred     rt eye    Vitreous hemorrhage of right eye ([address]     Wears glasses        Past Surgical History  Past Surgical History:   Procedure Laterality Date    APPENDECTOMY  1967    CARDIAC CATHETERIZATION      stents x2   [address], 1999    4 vessel bypass, 2 vwessel bypass    CATARACT REMOVAL WITH IMPLANT Bilateral October 13, right;  Oct 27, left eye    CHOLECYSTECTOMY  1990    laparoscopic    COLONOSCOPY  9/19/2006, 2015    Dr Antionette Hoffman, dx: Inject SQ 34 u in am, 32 U in pm (Patient taking differently: Inject SQ 34 u in am, 36 U in pm) 60 mL 1    atenolol (TENORMIN) 50 MG tablet Take 1 tablet by mouth daily 90 tablet 1    VICTOZA 18 MG/3ML SOPN SC injection INJECT 1.8 MG INTO THE SKIN DAILY 27 mL 2    levothyroxine (SYNTHROID) 50 MCG tablet TAKE 1 TABLET MONDAY THROUGH SATURDAY THEN TAKE ONE AND ONE-HALF TABLETS ON SUNDAYS 90 tablet 1    nitroGLYCERIN (NITROLINGUAL) 0.4 MG/SPRAY 0.4 mg spray Place 1 spray under the tongue as needed  0    aspirin 81 MG EC tablet Take 81 mg by mouth daily      calcitRIOL (ROCALTROL) 0.25 MCG capsule Take 0.25 mcg by mouth every other day       HUMALOG KWIKPEN 100 UNIT/ML pen INJECT PER SLIDING SCALE THREE TIMES A DAY BEFORE MEALS (MAX OF 80 UNITS DAILY) 60 mL 1    fluticasone (FLONASE) 50 MCG/ACT nasal spray 1 spray by Nasal route daily (Patient taking differently: 1 spray by Nasal route daily as needed ) 1 Bottle 3    bumetanide (BUMEX) 2 MG tablet Take 1 tablet by mouth daily      EFFIENT 10 MG TABS Take 1 tablet by mouth daily      losartan (COZAAR) 50 MG tablet Take 1 tablet by mouth daily 90 tablet 1    CPAP Machine MISC by Does not apply route Please change CPAP pressure to 14 cm H20. 1 each 0    albuterol sulfate HFA (PROVENTIL HFA) 108 (90 BASE) MCG/ACT inhaler Inhale 2 puffs into the lungs every 6 hours as needed for Wheezing 1 Inhaler 3    glucose blood VI test strips (ACCU-CHEK TERESA PLUS) strip Check BS 4 times a day 400 each 1    B-D ULTRAFINE III SHORT PEN 31G X 8 MM MISC USE TO INJECT INSULIN UNDER THE SKIN FOUR TIMES A  each 1    Acetaminophen 650 MG TABS Take 1,000 mg by mouth every 4 hours as needed for Pain  120 tablet 3    glucagon 1 MG injection Inject 1 mg into the skin See Admin Instructions. Follow package directions for low blood sugar.  1 kit 3    Multiple Vitamin (MULTI-VITAMIN) TABS   Take 1 tablet by mouth daily       rivaroxaban (XARELTO) 10 MG TABS tablet Take 10 mg chest pain and palpitations. Gastrointestinal: Positive for diarrhea. Negative for abdominal pain, blood in stool, nausea and vomiting. Endocrine: Negative for cold intolerance, heat intolerance and polydipsia. Genitourinary: Negative for dysuria, flank pain, genital sores, hematuria and testicular pain. Musculoskeletal: Negative for gait problem, joint swelling and myalgias. Skin: Negative for color change and rash. Allergic/Immunologic: Negative for immunocompromised state. Neurological: Negative for dizziness, tremors, seizures and speech difficulty. Hematological: Does not bruise/bleed easily. Psychiatric/Behavioral: Positive for decreased concentration. Negative for behavioral problems, confusion and suicidal ideas. OBJECTIVE     /66 (Site: Left Arm, Position: Sitting, Cuff Size: Medium Adult)   Pulse 76   Temp 98 °F (36.7 °C) (Tympanic)   Resp 18   Ht 6' (1.829 m)   Wt 256 lb 14.4 oz (116.5 kg)   SpO2 90%   BMI 34.84 kg/m²     Physical Exam   Constitutional: He is oriented to person, place, and time. He appears well-developed and well-nourished. HENT:   Head: Normocephalic and atraumatic. Mouth/Throat: Oropharynx is clear and moist.   Eyes: EOM are normal. Pupils are equal, round, and reactive to light. Neck: Normal range of motion. Neck supple. No JVD present. No tracheal deviation present. No thyromegaly present. Cardiovascular: Normal rate and normal heart sounds. Pulmonary/Chest: Breath sounds normal. No respiratory distress. He has no wheezes. Abdominal: Soft. Bowel sounds are normal. He exhibits no distension. There is tenderness in the periumbilical area. There is no rebound and no guarding. A hernia is present. Hernia confirmed positive in the ventral area. Musculoskeletal: Normal range of motion. He exhibits no edema. Lymphadenopathy:     He has no cervical adenopathy. Neurological: He is alert and oriented to person, place, and time.  No cranial nerve deficit. Skin: Skin is warm and dry. No rash noted. Psychiatric: He has a normal mood and affect. Vitals reviewed. Lab Results   Component Value Date    WBC 6.1 05/24/2018    HGB 13.8 (L) 05/24/2018    HCT 40.8 (L) 05/24/2018     05/24/2018    CHOL 95 (L) 05/24/2018    TRIG 102 05/24/2018    HDL 32 05/24/2018    LDLDIRECT 85.55 11/24/2015    ALT 32 05/24/2018    AST 40 05/24/2018     05/26/2018    K 3.7 05/27/2018    CL 98 05/26/2018    CREATININE 2.7 (H) 05/26/2018    BUN 59 (H) 05/26/2018    CO2 21 (L) 05/26/2018    TSH 4.050 08/19/2016    PSA <0.01 07/09/2018    INR 1.12 03/22/2018    GLUF 147 (H) 04/25/2016    LABA1C 8.2 03/26/2018    LABMICR 159.65 05/18/2015          PROCEDURE: CT ABDOMEN WO CONTRAST       CLINICAL INFORMATION: Umbilical hernia without obstruction and without gangrene .       COMPARISON: 9/1/2016       TECHNIQUE: Noncontrast images of the abdomen and pelvis with multiplanar reconstructions   All CT scans at this facility use dose modulation, iterative reconstruction, and/or weight-based dosing when appropriate to reduce radiation dose to as low as reasonably achievable.       FINDINGS: Lung bases   Heart size is borderline. Pacer leads are present. Coronary artery calcifications are present. Partially Calcified granuloma right lower lobe stable. Bibasilar fibrosis. Stable. Abdomen pelvis   Solid organ evaluation limited without IV contrast.   Spleen, liver, appear normal. Pancreas is atrophic. Gallbladder is absent. Adrenals are normal.   Mild perinephric stranding bilaterally. heavy atherosclerotic plaquing of the aorta. Marked vascular calcifications throughout the mesentery. Splenic artery is extensively calcified. No evidence of bowel obstruction. No adenopathy identified. Few subcutaneous densities suggesting possible medication administration. Possible prior laparoscopic surgery.  Questionable ventral hernia repair small fat-containing

## 2018-08-07 ASSESSMENT — ENCOUNTER SYMPTOMS
SORE THROAT: 0
TROUBLE SWALLOWING: 0
BLOOD IN STOOL: 0
VOMITING: 0
WHEEZING: 0
DIARRHEA: 1
COUGH: 0
ABDOMINAL PAIN: 0
VOICE CHANGE: 0
SHORTNESS OF BREATH: 1
NAUSEA: 0
COLOR CHANGE: 0
APNEA: 1

## 2018-08-15 ENCOUNTER — CARE COORDINATION (OUTPATIENT)
Dept: CARE COORDINATION | Age: 73
End: 2018-08-15

## 2018-08-29 ENCOUNTER — CARE COORDINATION (OUTPATIENT)
Dept: CARE COORDINATION | Age: 73
End: 2018-08-29

## 2018-08-29 NOTE — CARE COORDINATION
Ambulatory Care Coordination Note  8/29/2018  CM Risk Score: 8  Sherrill Mortality Risk Score: 27.08    ACC: Kyaw Viveros RN    Summary Note: Spoke with wife. Reports some lower extremity swelling but resolves overnight. Discussed reporting if swelling unresolved or worsens as well as changes in breathing, bloating, weight gain. BS are \"good\". No numbers given. Couldn't complete med rec as wife doesn't know all medications. Patient is medication adherent. Reminded of same day appointments. Encouraged to call with any problems, questions, concerns. Care Coordination Interventions    Program Enrollment:  Complex Care  Referral from Primary Care Provider:  No  Suggested Interventions and Community Resources  Diabetes Education:  Completed  Zone Management Tools: In Process         Goals Addressed             Most Recent     Activity Plan   Improving (8/29/2018)             I will increase my activity by walking on treadmill daily as tolerated. Barriers: none  Plan for overcoming my barriers: stop for chest pain  Confidence: 7/10  Anticipated Goal Completion Date: ongoing       Conditions and Symptoms   On track (8/29/2018)             I will schedule office visits, as directed by my provider. I will keep my appointment or reschedule if I have to cancel. I will notify my provider of any barriers to my plan of care. I will follow my Zone Management tool to seek urgent or emergent care. I will notify my provider of any symptoms that indicate a worsening of my condition.     Barriers: none  Plan for overcoming my barriers: N/A  Confidence: 8/10  Anticipated Goal Completion Date: 8/15/17         Nutrition Plan   Improving (8/29/2018)             I will follow a nutritional plan as directed   Calorie Controlled:   1500 Calories Low Fat (Fat Restricted:)  Minimum Fat - 25 gm    Barriers: none  Plan for overcoming my barriers: N/A  Confidence: 7/10  Anticipated Goal Completion Date: ongoing Historical Provider, MD   aspirin 81 MG EC tablet Take 81 mg by mouth daily    Historical Provider, MD   calcitRIOL (ROCALTROL) 0.25 MCG capsule Take 0.25 mcg by mouth every other day     Historical Provider, MD   HUMALOG KWIKPEN 100 UNIT/ML pen INJECT PER SLIDING SCALE THREE TIMES A DAY BEFORE MEALS (MAX OF 80 UNITS DAILY) 12/18/17   Gay Solano APRN - CNP   fluticasone (FLONASE) 50 MCG/ACT nasal spray 1 spray by Nasal route daily  Patient taking differently: 1 spray by Nasal route daily as needed  10/16/17   Samara Paul MD   bumetanide (BUMEX) 2 MG tablet Take 1 tablet by mouth daily 5/1/17   Historical Provider, MD   EFFIENT 10 MG TABS Take 1 tablet by mouth daily 11/11/16   Historical Provider, MD   losartan (COZAAR) 50 MG tablet Take 1 tablet by mouth daily 9/19/16   Samara Paul MD   CPAP Machine MISC by Does not apply route Please change CPAP pressure to 14 cm H20. 8/22/16   Whit Pruitt PA-C   albuterol sulfate HFA (PROVENTIL HFA) 108 (90 BASE) MCG/ACT inhaler Inhale 2 puffs into the lungs every 6 hours as needed for Wheezing 8/22/16   Whit Pruitt PA-C   glucose blood VI test strips (ACCU-CHEK TERESA PLUS) strip Check BS 4 times a day 4/19/16   Barbi Roland MD   B-D ULTRAFINE III SHORT PEN 31G X 8 MM MISC USE TO INJECT INSULIN UNDER THE SKIN FOUR TIMES A DAY 8/20/15   Robb Shetty, APRN - CNS   Acetaminophen 650 MG TABS Take 1,000 mg by mouth every 4 hours as needed for Pain  6/1/15   Autumn Cotto MD   glucagon 1 MG injection Inject 1 mg into the skin See Admin Instructions. Follow package directions for low blood sugar.  10/11/12   Barbi Roland MD   Multiple Vitamin (MULTI-VITAMIN) TABS   Take 1 tablet by mouth daily     Historical Provider, MD       Future Appointments  Date Time Provider Dean Tatiana   9/24/2018 9:00 AM Neema Arriola MD [facility]   10/16/2018 11:00 AM Samara Paul MD [email or web address]   12/6/2018 1:00 PM Marcus Friedman MD Adv Surg [address] [facility]

## 2018-09-14 RX ORDER — LEVOTHYROXINE SODIUM 0.05 MG/1
TABLET ORAL
Qty: 90 TABLET | Refills: 1 | Status: SHIPPED | OUTPATIENT
Start: 2018-09-14 | End: 2019-01-18 | Stop reason: SDUPTHER

## 2018-09-18 RX ORDER — ATENOLOL 50 MG/1
TABLET ORAL
Qty: 90 TABLET | Refills: 1 | Status: SHIPPED | OUTPATIENT
Start: 2018-09-18 | End: 2019-01-18 | Stop reason: SDUPTHER

## 2018-09-24 ENCOUNTER — OFFICE VISIT (OUTPATIENT)
Dept: FAMILY MEDICINE CLINIC | Age: 73
End: 2018-09-24
Payer: MEDICARE

## 2018-09-24 ENCOUNTER — OFFICE VISIT (OUTPATIENT)
Dept: PULMONOLOGY | Age: 73
End: 2018-09-24
Payer: MEDICARE

## 2018-09-24 VITALS
BODY MASS INDEX: 36.61 KG/M2 | DIASTOLIC BLOOD PRESSURE: 64 MMHG | HEART RATE: 74 BPM | WEIGHT: 270.3 LBS | SYSTOLIC BLOOD PRESSURE: 122 MMHG | HEIGHT: 72 IN | OXYGEN SATURATION: 98 %

## 2018-09-24 VITALS
HEIGHT: 72 IN | WEIGHT: 269 LBS | BODY MASS INDEX: 36.44 KG/M2 | RESPIRATION RATE: 16 BRPM | DIASTOLIC BLOOD PRESSURE: 77 MMHG | TEMPERATURE: 97.6 F | SYSTOLIC BLOOD PRESSURE: 145 MMHG | HEART RATE: 67 BPM

## 2018-09-24 DIAGNOSIS — Z99.89 OSA ON CPAP: Primary | ICD-10-CM

## 2018-09-24 DIAGNOSIS — N64.4 BREAST PAIN, LEFT: Primary | ICD-10-CM

## 2018-09-24 DIAGNOSIS — G47.33 OSA ON CPAP: Primary | ICD-10-CM

## 2018-09-24 DIAGNOSIS — Z23 NEED FOR PROPHYLACTIC VACCINATION AND INOCULATION AGAINST VARICELLA: ICD-10-CM

## 2018-09-24 PROCEDURE — 99213 OFFICE O/P EST LOW 20 MIN: CPT | Performed by: INTERNAL MEDICINE

## 2018-09-24 PROCEDURE — 99213 OFFICE O/P EST LOW 20 MIN: CPT | Performed by: FAMILY MEDICINE

## 2018-09-24 ASSESSMENT — PATIENT HEALTH QUESTIONNAIRE - PHQ9
2. FEELING DOWN, DEPRESSED OR HOPELESS: 0
1. LITTLE INTEREST OR PLEASURE IN DOING THINGS: 0
SUM OF ALL RESPONSES TO PHQ QUESTIONS 1-9: 0
SUM OF ALL RESPONSES TO PHQ QUESTIONS 1-9: 0
SUM OF ALL RESPONSES TO PHQ9 QUESTIONS 1 & 2: 0

## 2018-09-24 NOTE — PROGRESS NOTES
heart ? and During service    Hyperlipidemia 1989    Hypertension 1999    Hypothyroidism 2012    Peripheral vascular disease ([facility] [address] 1999    Pneumonia     Prostate cancer ([facility] [address] [facility] 2016    Retinopathy due to secondary diabetes ([address]     Tobacco abuse     Type II or unspecified type diabetes mellitus without mention of complication, not stated as uncontrolled 1985    Vision blurred     rt eye    Vitreous hemorrhage of right eye ([address]     Wears glasses      Past Surgical History:   Procedure Laterality Date    APPENDECTOMY  1967    CARDIAC CATHETERIZATION      stents x2   [address]    4 vessel bypass, 2 vwessel bypass    CATARACT REMOVAL WITH IMPLANT Bilateral October 13, right;  Oct 27, left eye    CHOLECYSTECTOMY  1990    laparoscopic    COLONOSCOPY  9/19/2006, 2015    Dr Odette Oro, dx: diverticulosis, benign polyps    CORONARY ARTERY BYPASS GRAFT  1989    x2 dr Devon mcintyre cardiologist victoria    CORONARY ARTERY BYPASS GRAFT  1999    3 vessel    CORONARY ARTERY BYPASS GRAFT  08/23/2016    Dr. Roosevelt Romo, [facility]  Feb and March 2014    retina detached   [facility] from chest tube     [facility]    RIGHT LEG SURGERY FOR PVD    MALIGNANT SKIN LESION EXCISION  2011    melanoma back    OTHER SURGICAL HISTORY      renal stent    PACEMAKER INSERTION  2014    PACEMAKER PLACEMENT      PROSTATECTOMY  07/27/2016    Robotic assisted Laparoscopic Radical prostatectomy r pelvic node dissection    REFRACTIVE SURGERY Bilateral     RETINAL LASER  December 2013    diabetic retinopathy    RETINAL LASER      SKIN BIOPSY      TONSILLECTOMY  child    UPPER GASTROINTESTINAL ENDOSCOPY  2017    20+ years    VASCULAR SURGERY      [facility]ab[facility] harvests    VITRECTOMY  2/13/14    rt vitrectomy     VITRECTOMY Right 03/06/14       Social History   Substance Use Topics    Smoking status: Former Smoker     Packs/day: 2.00     Years: 30.00 unknown     Cancer Paternal Uncle         unknown         EXAM  Vitals -  /64 (Site: Left Upper Arm, Position: Sitting)   Pulse 74   Ht 6' (1.829 m)   Wt 270 lb 4.8 oz (122.6 kg)   SpO2 98%   BMI 36.66 kg/m²    Body mass index is 36.66 kg/m². Oxygen level - RA    ESS -  15    Constitutional - BMI 36  Head  - Normocephalic and atraumatic. Mouth/Throat - Oropharynx is clear and moist.   Dentition - good  Mallampati Score - II (soft palate, uvula, fauces visible)  Cardiovascular - Normal rate, regular rhythm, normal heart sounds. No murmur heard. Pulmonary/Chest -  Effort normal and breath sounds normal.   Neurological - Patient is alert and oriented to person, place, and time. Psychiatric - Patient  has a normal mood and affect. ASSESSMENT  Obstructive Sleep Apnea (GAURI)  Poor mask fit with large leak and elevated AHI, however has come down from 60 to 10  Shayan wishes to continue same interface, no changes, did not bring his set up today  Does not wish to change for now      RECOMMENDATIONS    Rubideniz Enriquez is doing well. We will continue Shayan on the same PAP settings as he is currently on and arrange for follow up with his DME provider as needed between now and the next time we see him. RTC 6 mos, will send to DME for liners  He can call the sleep clinic for an earlier appointment if needed for worsening of sleep symptoms.      FOLLOW UP   6 months with a download

## 2018-09-24 NOTE — PROGRESS NOTES
Allergies:  is allergic to tape [adhesive tape]. Medications:   Current Outpatient Prescriptions   Medication Sig Dispense Refill    zoster recombinant adjuvanted vaccine (SHINGRIX) 50 MCG SUSR injection 50 MCG IM then repeat 2-6 months. 0.5 mL 1    atenolol (TENORMIN) 50 MG tablet TAKE 1 TABLET DAILY 90 tablet 1    levothyroxine (SYNTHROID) 50 MCG tablet TAKE 1 TABLET MONDAY THROUGH SATURDAY THEN TAKE ONE AND ONE-HALF TABLETS ON SUNDAYS 90 tablet 1    rivaroxaban (XARELTO) 10 MG TABS tablet Take 10 mg by mouth Pt starting 8/7-d/c effient      sertraline (ZOLOFT) 100 MG tablet TAKE 1 TABLET DAILY 90 tablet 1    CPAP Machine MISC by Does not apply route Please change APAP pressure to 12 to 20 cm H20. 1 each 0    bumetanide (BUMEX) 1 MG tablet Take 1 tablet by mouth daily 30 tablet 0    atorvastatin (LIPITOR) 80 MG tablet TAKE 1 TABLET DAILY 90 tablet 1    potassium chloride (KLOR-CON M) 10 MEQ extended release tablet Take 1 tablet by mouth 2 times daily 60 tablet 3    fluticasone (FLOVENT HFA) 110 MCG/ACT inhaler Inhale 2 puffs into the lungs 2 times daily Rinse mouth after its use.  1 Inhaler 11    hydrALAZINE (APRESOLINE) 50 MG tablet Take 50 mg by mouth 3 times daily as needed Take for systolic BP greater than 469      insulin glargine (LANTUS) 100 UNIT/ML injection vial Inject 36 Units into the skin nightly      insulin glargine (LANTUS SOLOSTAR) 100 UNIT/ML injection pen Inject SQ 34 u in am, 32 U in pm (Patient taking differently: Inject SQ 34 u in am, 36 U in pm) 60 mL 1    VICTOZA 18 MG/3ML SOPN SC injection INJECT 1.8 MG INTO THE SKIN DAILY 27 mL 2    nitroGLYCERIN (NITROLINGUAL) 0.4 MG/SPRAY 0.4 mg spray Place 1 spray under the tongue as needed  0    aspirin 81 MG EC tablet Take 81 mg by mouth daily      calcitRIOL (ROCALTROL) 0.25 MCG capsule Take 0.25 mcg by mouth every other day       HUMALOG KWIKPEN 100 UNIT/ML pen INJECT PER SLIDING SCALE THREE TIMES A DAY BEFORE MEALS (MAX OF 80 UNITS DAILY) 60 mL 1    fluticasone (FLONASE) 50 MCG/ACT nasal spray 1 spray by Nasal route daily (Patient taking differently: 1 spray by Nasal route daily as needed ) 1 Bottle 3    bumetanide (BUMEX) 2 MG tablet Take 1 tablet by mouth daily      EFFIENT 10 MG TABS Take 1 tablet by mouth daily      losartan (COZAAR) 50 MG tablet Take 1 tablet by mouth daily 90 tablet 1    CPAP Machine MISC by Does not apply route Please change CPAP pressure to 14 cm H20. 1 each 0    albuterol sulfate HFA (PROVENTIL HFA) 108 (90 BASE) MCG/ACT inhaler Inhale 2 puffs into the lungs every 6 hours as needed for Wheezing 1 Inhaler 3    glucose blood VI test strips (ACCU-CHEK TERESA PLUS) strip Check BS 4 times a day 400 each 1    B-D ULTRAFINE III SHORT PEN 31G X 8 MM MISC USE TO INJECT INSULIN UNDER THE SKIN FOUR TIMES A  each 1    Acetaminophen 650 MG TABS Take 1,000 mg by mouth every 4 hours as needed for Pain  120 tablet 3    glucagon 1 MG injection Inject 1 mg into the skin See Admin Instructions. Follow package directions for low blood sugar. 1 kit 3    Multiple Vitamin (MULTI-VITAMIN) TABS   Take 1 tablet by mouth daily       omeprazole (PRILOSEC) 40 MG delayed release capsule Take 1 capsule by mouth daily Diagnosis: GERD. 56 capsule 0     No current facility-administered medications for this visit. Review of systems:  Review of Systems - History obtained from the patient  General ROS: negative for - chills, fatigue or fever  Breast ROS: positive for - tenderness    Physical Examination:  BP (!) 145/77 (Site: Left Upper Arm, Position: Sitting, Cuff Size: Large Adult)   Pulse 67   Temp 97.6 °F (36.4 °C) (Oral)   Resp 16   Ht 6' (1.829 m)   Wt 269 lb (122 kg)   BMI 36.48 kg/m²     General-  Alert and oriented x 3, NAD  Breast- left breast has mild tenderness and local induration and there is mild erythema around the nipple    Impression:   Diagnosis Orders   1.  Breast pain, left  US BREAST LIMITED

## 2018-09-25 ENCOUNTER — HOSPITAL ENCOUNTER (OUTPATIENT)
Dept: WOMENS IMAGING | Age: 73
Discharge: HOME | End: 2018-09-25
Payer: MEDICARE

## 2018-09-25 ENCOUNTER — APPOINTMENT (OUTPATIENT)
Dept: WOMENS IMAGING | Age: 73
End: 2018-09-25
Payer: MEDICARE

## 2018-09-25 DIAGNOSIS — N64.4 BREAST PAIN, LEFT: ICD-10-CM

## 2018-09-25 PROCEDURE — 77066 DX MAMMO INCL CAD BI: CPT

## 2018-09-26 ENCOUNTER — TELEPHONE (OUTPATIENT)
Dept: FAMILY MEDICINE CLINIC | Age: 73
End: 2018-09-26

## 2018-09-26 DIAGNOSIS — N62 GYNECOMASTIA: Primary | ICD-10-CM

## 2018-09-26 PROCEDURE — G0008 ADMIN INFLUENZA VIRUS VAC: HCPCS | Performed by: FAMILY MEDICINE

## 2018-09-26 PROCEDURE — 90662 IIV NO PRSV INCREASED AG IM: CPT | Performed by: FAMILY MEDICINE

## 2018-09-26 NOTE — LETTER
[address]  [address] [address]  Phone: [phone]  Fax: [phone]    Bladimir Yepez MD        September 27, 2018 [address]      Dear Naila Jameson:    Our office has been trying to contact you pertaining your test results. Please contact our office at your first convenience. Our phone number is [phone] and our hours are Monday through Thursday 8:00 am to 4:30 pm and Friday 8:00 am to 12:00 pm.  Thank you. If you have any questions or concerns, please don't hesitate to call.     Sincerely,        Bladimir Yepez MD

## 2018-10-02 ENCOUNTER — CARE COORDINATION (OUTPATIENT)
Dept: CARE COORDINATION | Age: 73
End: 2018-10-02

## 2018-10-02 NOTE — CARE COORDINATION
Ambulatory Care Coordination Note  10/2/2018  CM Risk Score: 8  Sherrill Mortality Risk Score: 27.08    ACC: Flex Girard, RN    Summary Note: Met with patient while in PCP office for wife's appointment. Has some leg swelling and weight gain. Breathing baseline. Reviewed medications. Doesn't take Bumex daily. Takes when he has leg swelling. Discussed the importance of medication adherence. Discussed taking water pills daily as ordered. Discussed risks of taking only when fluid retention. Discussed taking bumex daily will maintain and prevent CHF exacerbation. Verbalizes understanding. Denies chest pain and states he cannot remember last time he needed NTG. Patient tearful as expressed concern for wife's condition.  for over 50 years. Provided emotional support. Encouraged to call with any problems, questions, concerns. Congestive Heart Failure Assessment    Are you currently restricting fluids?:  2000cc  Do you understand a low sodium diet?:  Yes  Do you understand how to read food labels?:  Yes  How many restaurant meals do you eat per week?:  1-2  Do you salt your food before tasting it?:  No     Swelling (worse than baseline) in hands, feet/legs or around abdomen, Increase in weights (more than 3lbs overnight or 5lbs in one week)      Symptoms:   CHF associated leg swelling: Pos      Symptom course:  stable  Weight trend:  increasing steadily  Salt intake watch compared to last visit:  stable         Care Coordination Interventions    Program Enrollment:  Complex Care  Referral from Primary Care Provider:  No  Suggested Interventions and Community Resources  Diabetes Education:  Completed  Zone Management Tools: In Process         Goals Addressed             Most Recent     Conditions and Symptoms   On track (10/2/2018)             I will schedule office visits, as directed by my provider. I will keep my appointment or reschedule if I have to cancel.   I will notify my provider of

## 2018-10-04 NOTE — TELEPHONE ENCOUNTER
Last visit- 9/24/2018  Next visit- 10/16/2018    Requested Prescriptions     Signed Prescriptions Disp Refills    insulin glargine (LANTUS SOLOSTAR) 100 UNIT/ML injection pen 60 mL 2     Sig: Inject SQ 34 u in am, 32 U in pm     Authorizing Provider: Melissa Ahumada

## 2018-10-06 ENCOUNTER — CARE COORDINATION (OUTPATIENT)
Dept: CARE COORDINATION | Age: 73
End: 2018-10-06

## 2018-10-16 ENCOUNTER — OFFICE VISIT (OUTPATIENT)
Dept: FAMILY MEDICINE CLINIC | Age: 73
End: 2018-10-16
Payer: MEDICARE

## 2018-10-16 VITALS
HEART RATE: 68 BPM | SYSTOLIC BLOOD PRESSURE: 121 MMHG | WEIGHT: 265 LBS | DIASTOLIC BLOOD PRESSURE: 57 MMHG | BODY MASS INDEX: 35.94 KG/M2 | OXYGEN SATURATION: 97 % | RESPIRATION RATE: 12 BRPM

## 2018-10-16 DIAGNOSIS — E29.1 HYPOGONADISM IN MALE: ICD-10-CM

## 2018-10-16 DIAGNOSIS — J44.9 CHRONIC OBSTRUCTIVE PULMONARY DISEASE, UNSPECIFIED COPD TYPE (HCC): ICD-10-CM

## 2018-10-16 DIAGNOSIS — N18.30 CHRONIC KIDNEY DISEASE, STAGE III (MODERATE) (HCC): ICD-10-CM

## 2018-10-16 DIAGNOSIS — I10 ESSENTIAL HYPERTENSION: ICD-10-CM

## 2018-10-16 DIAGNOSIS — R60.0 BILATERAL LOWER EXTREMITY EDEMA: ICD-10-CM

## 2018-10-16 DIAGNOSIS — N62 GYNECOMASTIA: ICD-10-CM

## 2018-10-16 LAB — HBA1C MFR BLD: 8.9 %

## 2018-10-16 PROCEDURE — 83036 HEMOGLOBIN GLYCOSYLATED A1C: CPT | Performed by: FAMILY MEDICINE

## 2018-10-16 PROCEDURE — 99214 OFFICE O/P EST MOD 30 MIN: CPT | Performed by: FAMILY MEDICINE

## 2018-10-25 ENCOUNTER — CARE COORDINATION (OUTPATIENT)
Dept: CARE COORDINATION | Age: 73
End: 2018-10-25

## 2018-10-25 NOTE — CARE COORDINATION
Message from patient. Returned call and is his wife's phone, who's admitted. Patient left her room but will be back. States she will have him call me back.

## 2018-12-04 ENCOUNTER — CARE COORDINATION (OUTPATIENT)
Dept: CARE COORDINATION | Age: 73
End: 2018-12-04

## 2018-12-04 ASSESSMENT — ENCOUNTER SYMPTOMS: DYSPNEA ASSOCIATED WITH: EXERTION

## 2018-12-04 NOTE — CARE COORDINATION
in sputum?:  No/At Baseline  Have you had a recent diagnosis of pneumonia either by PCP or at a hospital?:  No               Care Coordination Interventions    Program Enrollment:  Complex Care  Referral from Primary Care Provider:  No  Suggested Interventions and Community Resources  Diabetes Education:  Completed  Zone Management Tools: In Process         Goals Addressed             Most Recent     Activity Plan   On track (12/4/2018)             I will increase my activity by walking on treadmill daily as tolerated. Barriers: none  Plan for overcoming my barriers: stop for chest pain  Confidence: 7/10  Anticipated Goal Completion Date: ongoing       Conditions and Symptoms   On track (12/4/2018)             I will schedule office visits, as directed by my provider. I will keep my appointment or reschedule if I have to cancel. I will notify my provider of any barriers to my plan of care. I will follow my Zone Management tool to seek urgent or emergent care. I will notify my provider of any symptoms that indicate a worsening of my condition. Barriers: none  Plan for overcoming my barriers: N/A  Confidence: 8/10  Anticipated Goal Completion Date: 8/15/17              Prior to Admission medications    Medication Sig Start Date End Date Taking?  Authorizing Provider   insulin glargine (LANTUS SOLOSTAR) 100 UNIT/ML injection pen Inject SQ 34 u in am, 32 U in pm 10/3/18   Jenni Price MD   zoster recombinant adjuvanted vaccine [facility]) 50 MCG SUSR injection 50 MCG IM then repeat 2-6 months. 9/24/18 9/24/19  Jenni Price MD   atenolol (TENORMIN) 50 MG tablet TAKE 1 TABLET DAILY 9/18/18   Jenni Price MD   levothyroxine (SYNTHROID) 50 MCG tablet TAKE 1 TABLET MONDAY THROUGH SATURDAY THEN TAKE ONE AND ONE-HALF TABLETS ON SUNDAYS 9/14/18   Jenni Price MD   rivaroxaban (XARELTO) 10 MG TABS tablet Take 10 mg by mouth Pt starting 8/7-d/c effient 8/7/18   Historical Provider, MD   sertraline (ZOLOFT) 100 MG tablet 11/11/16   Historical Provider, MD   losartan (COZAAR) 50 MG tablet Take 1 tablet by mouth daily 9/19/16   Kevin Duran MD   CPAP Machine MISC by Does not apply route Please change CPAP pressure to 14 cm H20. 8/22/16   Estefany Shepard PA-C   albuterol sulfate HFA (PROVENTIL HFA) 108 (90 BASE) MCG/ACT inhaler Inhale 2 puffs into the lungs every 6 hours as needed for Wheezing 8/22/16   Estefany Shepard PA-C   glucose blood VI test strips (ACCU-CHEK TERESA PLUS) strip Check BS 4 times a day 4/19/16   Levy Flaherty MD   B-D ULTRAFINE III SHORT PEN 31G X 8 MM MISC USE TO INJECT INSULIN UNDER THE SKIN FOUR TIMES A DAY 8/20/15   ZHANNA Muller - CNS   Acetaminophen 650 MG TABS Take 1,000 mg by mouth every 4 hours as needed for Pain  6/1/15   Autumn Cotto MD   glucagon 1 MG injection Inject 1 mg into the skin See Admin Instructions. Follow package directions for low blood sugar.  10/11/12   Levy Flaherty MD   Multiple Vitamin (MULTI-VITAMIN) TABS   Take 1 tablet by mouth daily     Historical Provider, MD       Future Appointments  Date Time Provider Dean Simpson   12/6/2018 1:00 PM Cathy Salgado MD Adv Surg [facility] Taylor Jennings   1/18/2019 9:30 AM Kevin Duran MD [facility] Renetta Mathur   3/25/2019 8:30 AM Nicki Figueroa   7/12/2019 9:45 AM Mya Mo [facility]rology [facility] [place]

## 2018-12-06 ENCOUNTER — OFFICE VISIT (OUTPATIENT)
Dept: SURGERY | Age: 73
End: 2018-12-06
Payer: MEDICARE

## 2018-12-06 VITALS
HEART RATE: 90 BPM | BODY MASS INDEX: 35.76 KG/M2 | RESPIRATION RATE: 18 BRPM | TEMPERATURE: 98.9 F | SYSTOLIC BLOOD PRESSURE: 138 MMHG | WEIGHT: 264 LBS | HEIGHT: 72 IN | OXYGEN SATURATION: 95 % | DIASTOLIC BLOOD PRESSURE: 80 MMHG

## 2018-12-06 DIAGNOSIS — K42.9 UMBILICAL HERNIA WITHOUT OBSTRUCTION AND WITHOUT GANGRENE: Primary | ICD-10-CM

## 2018-12-06 DIAGNOSIS — E66.9 OBESITY (BMI 30.0-34.9): ICD-10-CM

## 2018-12-06 DIAGNOSIS — I25.10 CAD IN NATIVE ARTERY: ICD-10-CM

## 2018-12-06 DIAGNOSIS — E11.65 TYPE 2 DIABETES MELLITUS WITH HYPERGLYCEMIA, UNSPECIFIED WHETHER LONG TERM INSULIN USE (HCC): ICD-10-CM

## 2018-12-06 PROCEDURE — 99203 OFFICE O/P NEW LOW 30 MIN: CPT | Performed by: SURGERY

## 2018-12-07 ASSESSMENT — ENCOUNTER SYMPTOMS
NAUSEA: 0
WHEEZING: 0
APNEA: 0
VOMITING: 0
COUGH: 0
COLOR CHANGE: 0
TROUBLE SWALLOWING: 0
SORE THROAT: 0
VOICE CHANGE: 0
SHORTNESS OF BREATH: 0
DIARRHEA: 0
BLOOD IN STOOL: 0
ABDOMINAL PAIN: 0

## 2018-12-07 NOTE — PROGRESS NOTES
Kirsty Perea MD   General Surgery  Follow-up Patient Evaluation in Office  Pt Name: Helio Jimenes  Date of Birth 1945   Today's Date: 12/6/2018  Medical Record Number: [id number]  Referring Provider: No ref. provider found  Primary Care Provider: Fina Wilcox MD  Chief Complaint:  Chief Complaint   Patient presents with    Check-Up     elsa umbilical hernia-4 month fu-       ASSESSMENT      1. Umbilical hernia without obstruction and without gangrene    2. CAD in native artery    3. Type 2 diabetes mellitus with hyperglycemia, unspecified whether long term insulin use (HCC)    4. Obesity (BMI 30.0-34. 9)         PLANS      1. Query Dr. Leonel Garcia office when the patient can go off anticoagulation to repair his umbilical hernia. 2.  Recommend abdominal binder when upAs needed for support. .  3.  Again counseled patient on techniques of reducing hernia when, and if, it becomes incarcerated. 4.  Recheck in surgical clinic in about 3-4 months  5. Patient instructed to call for increasing symptoms related to his hernia. Signs of incarceration and obstruction were discussed as well. 6.  Sees Dr. Candyce Meckel early next year. His hernia is symptomatic with pain. When he can come off his antiplatelet blood thinning medications and we will plan for a repair if his cardiac status remains stable. SUBJECTIVE   History of present illness:  Stephon Post is a 75y.o. year old male who is presenting today in the office for evaluation of an umbilical hernia. He has a history of multiple medical issues coronary artery disease he's had multiple cardiac stents as well as a remote coronary artery bypass graft. Most recent stents were placed earlier this year, drug-eluting by Dr. Candyce Meckel. He states he has had hernia in his mid abdomen for well over a year. He had prior laparoscopic surgery and a port was at that location. It did recently become incarcerated but he was able to reduce it himself with gentle pressure.   He has no wheezes. Abdominal: Soft. Bowel sounds are normal. He exhibits no distension. There is no rebound and no guarding. A hernia is present. Hernia confirmed positive in the ventral area. Musculoskeletal: Normal range of motion. He exhibits no edema. Lymphadenopathy:     He has no cervical adenopathy. Neurological: He is alert and oriented to person, place, and time. No cranial nerve deficit. Skin: Skin is warm and dry. No rash noted. Psychiatric: He has a normal mood and affect. Vitals reviewed. Lab Results   Component Value Date    WBC 7.1 11/19/2018    HGB 13.9 11/19/2018    HCT 40.4 11/19/2018     11/19/2018    CHOL 95 (L) 05/24/2018    TRIG 102 05/24/2018    HDL 32 05/24/2018    LDLDIRECT 85.55 11/24/2015    ALT 32 05/24/2018    AST 40 05/24/2018     11/19/2018    K 4.4 11/19/2018     11/19/2018    CREATININE 2.56 (H) 11/19/2018    BUN 40 (H) 11/19/2018    CO2 27 11/19/2018    TSH 4.050 08/19/2016    PSA <0.01 07/09/2018    INR 1.12 03/22/2018    GLUF 147 (H) 04/25/2016    LABA1C 8.9 10/16/2018    LABMICR 159.65 05/18/2015          PROCEDURE: CT ABDOMEN WO CONTRAST       CLINICAL INFORMATION: Umbilical hernia without obstruction and without gangrene .       COMPARISON: 9/1/2016       TECHNIQUE: Noncontrast images of the abdomen and pelvis with multiplanar reconstructions   All CT scans at this facility use dose modulation, iterative reconstruction, and/or weight-based dosing when appropriate to reduce radiation dose to as low as reasonably achievable.       FINDINGS: Lung bases   Heart size is borderline. Pacer leads are present. Coronary artery calcifications are present. Partially Calcified granuloma right lower lobe stable. Bibasilar fibrosis. Stable. Abdomen pelvis   Solid organ evaluation limited without IV contrast.   Spleen, liver, appear normal. Pancreas is atrophic. Gallbladder is absent. Adrenals are normal.   Mild perinephric stranding bilaterally.    heavy

## 2018-12-08 DIAGNOSIS — E78.5 HYPERLIPIDEMIA, UNSPECIFIED HYPERLIPIDEMIA TYPE: ICD-10-CM

## 2018-12-11 RX ORDER — ATORVASTATIN CALCIUM 80 MG/1
TABLET, FILM COATED ORAL
Qty: 90 TABLET | Refills: 1 | Status: SHIPPED | OUTPATIENT
Start: 2018-12-11 | End: 2019-01-01 | Stop reason: SDUPTHER

## 2018-12-26 RX ORDER — LIRAGLUTIDE 6 MG/ML
INJECTION SUBCUTANEOUS
Qty: 27 ML | Refills: 2 | Status: SHIPPED | OUTPATIENT
Start: 2018-12-26 | End: 2019-01-01 | Stop reason: SDUPTHER

## 2019-01-01 ENCOUNTER — PROCEDURE VISIT (OUTPATIENT)
Dept: UROLOGY | Age: 74
End: 2019-01-01
Payer: MEDICARE

## 2019-01-01 ENCOUNTER — NURSE ONLY (OUTPATIENT)
Dept: LAB | Age: 74
End: 2019-01-01

## 2019-01-01 ENCOUNTER — TELEPHONE (OUTPATIENT)
Dept: FAMILY MEDICINE CLINIC | Age: 74
End: 2019-01-01

## 2019-01-01 ENCOUNTER — OFFICE VISIT (OUTPATIENT)
Dept: FAMILY MEDICINE CLINIC | Age: 74
End: 2019-01-01
Payer: MEDICARE

## 2019-01-01 ENCOUNTER — OFFICE VISIT (OUTPATIENT)
Dept: ENT CLINIC | Age: 74
End: 2019-01-01
Payer: MEDICARE

## 2019-01-01 ENCOUNTER — TELEPHONE (OUTPATIENT)
Dept: ENT CLINIC | Age: 74
End: 2019-01-01

## 2019-01-01 ENCOUNTER — TELEPHONE (OUTPATIENT)
Dept: PULMONOLOGY | Age: 74
End: 2019-01-01

## 2019-01-01 ENCOUNTER — OFFICE VISIT (OUTPATIENT)
Dept: UROLOGY | Age: 74
End: 2019-01-01
Payer: MEDICARE

## 2019-01-01 ENCOUNTER — HOSPITAL ENCOUNTER (EMERGENCY)
Age: 74
Discharge: HOME | End: 2019-06-11
Attending: EMERGENCY MEDICINE
Payer: MEDICARE

## 2019-01-01 ENCOUNTER — HOSPITAL ENCOUNTER (OUTPATIENT)
Age: 74
Discharge: HOME | End: 2019-07-17
Payer: MEDICARE

## 2019-01-01 ENCOUNTER — TELEPHONE (OUTPATIENT)
Dept: UROLOGY | Age: 74
End: 2019-01-01

## 2019-01-01 ENCOUNTER — CARE COORDINATION (OUTPATIENT)
Dept: CASE MANAGEMENT | Age: 74
End: 2019-01-01

## 2019-01-01 ENCOUNTER — OFFICE VISIT (OUTPATIENT)
Dept: PULMONOLOGY | Age: 74
End: 2019-01-01
Payer: MEDICARE

## 2019-01-01 VITALS
BODY MASS INDEX: 36.14 KG/M2 | SYSTOLIC BLOOD PRESSURE: 126 MMHG | DIASTOLIC BLOOD PRESSURE: 73 MMHG | HEART RATE: 88 BPM | HEIGHT: 72 IN | WEIGHT: 266.8 LBS | OXYGEN SATURATION: 93 %

## 2019-01-01 VITALS
DIASTOLIC BLOOD PRESSURE: 74 MMHG | TEMPERATURE: 97.6 F | RESPIRATION RATE: 20 BRPM | HEIGHT: 72 IN | HEART RATE: 84 BPM | WEIGHT: 264.3 LBS | SYSTOLIC BLOOD PRESSURE: 136 MMHG | BODY MASS INDEX: 35.8 KG/M2

## 2019-01-01 VITALS
SYSTOLIC BLOOD PRESSURE: 132 MMHG | BODY MASS INDEX: 35.89 KG/M2 | WEIGHT: 265 LBS | HEIGHT: 72 IN | DIASTOLIC BLOOD PRESSURE: 68 MMHG

## 2019-01-01 VITALS
BODY MASS INDEX: 35.01 KG/M2 | TEMPERATURE: 98 F | HEART RATE: 80 BPM | RESPIRATION RATE: 16 BRPM | WEIGHT: 258.5 LBS | SYSTOLIC BLOOD PRESSURE: 128 MMHG | DIASTOLIC BLOOD PRESSURE: 80 MMHG | HEIGHT: 72 IN

## 2019-01-01 VITALS
DIASTOLIC BLOOD PRESSURE: 67 MMHG | SYSTOLIC BLOOD PRESSURE: 128 MMHG | HEIGHT: 72 IN | TEMPERATURE: 98.1 F | HEART RATE: 74 BPM | WEIGHT: 267 LBS | RESPIRATION RATE: 16 BRPM | BODY MASS INDEX: 36.16 KG/M2

## 2019-01-01 VITALS
DIASTOLIC BLOOD PRESSURE: 46 MMHG | WEIGHT: 263 LBS | BODY MASS INDEX: 35.62 KG/M2 | HEIGHT: 72 IN | SYSTOLIC BLOOD PRESSURE: 100 MMHG

## 2019-01-01 VITALS
TEMPERATURE: 97.3 F | WEIGHT: 260 LBS | OXYGEN SATURATION: 97 % | RESPIRATION RATE: 18 BRPM | HEIGHT: 72 IN | HEART RATE: 74 BPM | SYSTOLIC BLOOD PRESSURE: 163 MMHG | DIASTOLIC BLOOD PRESSURE: 82 MMHG | BODY MASS INDEX: 35.21 KG/M2

## 2019-01-01 VITALS
BODY MASS INDEX: 36.21 KG/M2 | RESPIRATION RATE: 12 BRPM | DIASTOLIC BLOOD PRESSURE: 67 MMHG | SYSTOLIC BLOOD PRESSURE: 147 MMHG | HEART RATE: 64 BPM | WEIGHT: 267 LBS

## 2019-01-01 VITALS
TEMPERATURE: 97.5 F | RESPIRATION RATE: 16 BRPM | WEIGHT: 265.9 LBS | SYSTOLIC BLOOD PRESSURE: 138 MMHG | HEART RATE: 84 BPM | HEIGHT: 72 IN | BODY MASS INDEX: 36.01 KG/M2 | DIASTOLIC BLOOD PRESSURE: 86 MMHG

## 2019-01-01 VITALS
SYSTOLIC BLOOD PRESSURE: 124 MMHG | RESPIRATION RATE: 16 BRPM | HEART RATE: 80 BPM | BODY MASS INDEX: 35.08 KG/M2 | WEIGHT: 259 LBS | DIASTOLIC BLOOD PRESSURE: 68 MMHG | TEMPERATURE: 97.7 F | HEIGHT: 72 IN

## 2019-01-01 DIAGNOSIS — R04.0 EPISTAXIS: Primary | ICD-10-CM

## 2019-01-01 DIAGNOSIS — N18.6 ESRD (END STAGE RENAL DISEASE) (HCC): ICD-10-CM

## 2019-01-01 DIAGNOSIS — E66.9 OBESITY (BMI 30-39.9): ICD-10-CM

## 2019-01-01 DIAGNOSIS — C61 PROSTATE CANCER (HCC): ICD-10-CM

## 2019-01-01 DIAGNOSIS — J44.9 CHRONIC OBSTRUCTIVE PULMONARY DISEASE, UNSPECIFIED COPD TYPE (HCC): ICD-10-CM

## 2019-01-01 DIAGNOSIS — E11.65 UNCONTROLLED TYPE 2 DIABETES MELLITUS WITH HYPERGLYCEMIA (HCC): Primary | ICD-10-CM

## 2019-01-01 DIAGNOSIS — G47.33 OSA (OBSTRUCTIVE SLEEP APNEA): Primary | ICD-10-CM

## 2019-01-01 DIAGNOSIS — R42 DIZZINESS: ICD-10-CM

## 2019-01-01 DIAGNOSIS — R04.0 EPISTAXIS: ICD-10-CM

## 2019-01-01 DIAGNOSIS — K92.2 LOWER GI BLEED: Primary | ICD-10-CM

## 2019-01-01 DIAGNOSIS — E78.5 HYPERLIPIDEMIA, UNSPECIFIED HYPERLIPIDEMIA TYPE: ICD-10-CM

## 2019-01-01 DIAGNOSIS — J34.89 NASAL CRUSTING: ICD-10-CM

## 2019-01-01 DIAGNOSIS — R31.29 MICROSCOPIC HEMATURIA: ICD-10-CM

## 2019-01-01 DIAGNOSIS — E03.9 ACQUIRED HYPOTHYROIDISM: ICD-10-CM

## 2019-01-01 DIAGNOSIS — Z99.89 OSA ON CPAP: Primary | ICD-10-CM

## 2019-01-01 DIAGNOSIS — C61 PROSTATE CANCER (HCC): Primary | ICD-10-CM

## 2019-01-01 DIAGNOSIS — R31.29 MICROSCOPIC HEMATURIA: Primary | ICD-10-CM

## 2019-01-01 DIAGNOSIS — R04.0 EPISTAXIS, RECURRENT: Primary | ICD-10-CM

## 2019-01-01 DIAGNOSIS — G47.33 OSA ON CPAP: Primary | ICD-10-CM

## 2019-01-01 LAB
ANION GAP SERPL CALCULATED.3IONS-SCNC: 16 MEQ/L (ref 8–16)
APTT: 41.7 SECONDS (ref 22–38)
BACTERIA: ABNORMAL /HPF
BASOPHILS # BLD: 0.8 %
BASOPHILS ABSOLUTE: 0.1 THOU/MM3 (ref 0–0.1)
BILIRUBIN URINE: NEGATIVE
BLOOD URINE, POC: ABNORMAL
BLOOD URINE, POC: ABNORMAL
BLOOD, URINE: NEGATIVE
BUN BLDV-MCNC: 46 MG/DL (ref 7–22)
CALCIUM SERPL-MCNC: 9.6 MG/DL (ref 8.5–10.5)
CASTS 2: ABNORMAL /LPF
CASTS UA: ABNORMAL /LPF
CHARACTER, URINE: CLEAR
CHLORIDE BLD-SCNC: 105 MEQ/L (ref 98–111)
CO2: 18 MEQ/L (ref 23–33)
COLOR, URINE: YELLOW
COLOR, URINE: YELLOW
COLOR: YELLOW
CREAT SERPL-MCNC: 2.5 MG/DL (ref 0.4–1.2)
CRYSTALS, UA: ABNORMAL
EOSINOPHIL # BLD: 1.7 %
EOSINOPHILS ABSOLUTE: 0.1 THOU/MM3 (ref 0–0.4)
EPITHELIAL CELLS, UA: ABNORMAL /HPF
ERYTHROCYTE [DISTWIDTH] IN BLOOD BY AUTOMATED COUNT: 13.8 % (ref 11.5–14.5)
ERYTHROCYTE [DISTWIDTH] IN BLOOD BY AUTOMATED COUNT: 14.8 % (ref 11.5–14.5)
ERYTHROCYTE [DISTWIDTH] IN BLOOD BY AUTOMATED COUNT: 48.2 FL (ref 35–45)
ERYTHROCYTE [DISTWIDTH] IN BLOOD BY AUTOMATED COUNT: 53.3 FL (ref 35–45)
GFR SERPL CREATININE-BSD FRML MDRD: 25 ML/MIN/1.73M2
GLUCOSE BLD-MCNC: 230 MG/DL (ref 70–108)
GLUCOSE URINE: 250 MG/DL
GLUCOSE URINE: NEGATIVE MG/DL
GLUCOSE URINE: NEGATIVE MG/DL
HBA1C MFR BLD: 7.7 %
HBA1C MFR BLD: 9.1 %
HCT VFR BLD CALC: 33.1 % (ref 42–52)
HCT VFR BLD CALC: 37.6 % (ref 42–52)
HEMOCCULT STL QL: POSITIVE
HEMOGLOBIN: 10.8 GM/DL (ref 14–18)
HEMOGLOBIN: 12.5 GM/DL (ref 14–18)
IMMATURE GRANS (ABS): 0.06 THOU/MM3 (ref 0–0.07)
IMMATURE GRANULOCYTES: 0.8 %
INR BLD: 1.09 (ref 0.85–1.13)
KETONES, URINE: NEGATIVE
LEUKOCYTE CLUMPS, URINE: NEGATIVE
LEUKOCYTE CLUMPS, URINE: NEGATIVE
LEUKOCYTE ESTERASE, URINE: NEGATIVE
LYMPHOCYTES # BLD: 12.9 %
LYMPHOCYTES ABSOLUTE: 1 THOU/MM3 (ref 1–4.8)
MCH RBC QN AUTO: 31.8 PG (ref 26–33)
MCH RBC QN AUTO: 32.1 PG (ref 26–33)
MCHC RBC AUTO-ENTMCNC: 32.6 GM/DL (ref 32.2–35.5)
MCHC RBC AUTO-ENTMCNC: 33.2 GM/DL (ref 32.2–35.5)
MCV RBC AUTO: 95.7 FL (ref 80–94)
MCV RBC AUTO: 98.5 FL (ref 80–94)
MISCELLANEOUS 2: ABNORMAL
MONOCYTES # BLD: 12.1 %
MONOCYTES ABSOLUTE: 0.9 THOU/MM3 (ref 0.4–1.3)
NITRITE, URINE: NEGATIVE
NUCLEATED RED BLOOD CELLS: 0 /100 WBC
ORGANISM: ABNORMAL
OSMOLALITY CALCULATION: 296.7 MOSMOL/KG (ref 275–300)
PH UA: 5 (ref 5–9)
PH, URINE: 5 (ref 5–9)
PH, URINE: 5.5 (ref 5–9)
PLATELET # BLD: 242 THOU/MM3 (ref 130–400)
PLATELET # BLD: 273 THOU/MM3 (ref 130–400)
PMV BLD AUTO: 10.6 FL (ref 9.4–12.4)
PMV BLD AUTO: 11.3 FL (ref 9.4–12.4)
POTASSIUM REFLEX MAGNESIUM: 4.9 MEQ/L (ref 3.5–5.2)
PROSTATE SPECIFIC ANTIGEN: < 0.02 NG/ML (ref 0–1)
PROTEIN UA: 100
PROTEIN, URINE: 100 MG/DL
PROTEIN, URINE: >= 300 MG/DL
RBC # BLD: 3.36 MILL/MM3 (ref 4.7–6.1)
RBC # BLD: 3.93 MILL/MM3 (ref 4.7–6.1)
RBC URINE: ABNORMAL /HPF
REASON FOR REJECTION: NORMAL
REJECTED TEST: NORMAL
RENAL EPITHELIAL, UA: ABNORMAL
SEG NEUTROPHILS: 71.7 %
SEGMENTED NEUTROPHILS ABSOLUTE COUNT: 5.4 THOU/MM3 (ref 1.8–7.7)
SODIUM BLD-SCNC: 139 MEQ/L (ref 135–145)
SPECIFIC GRAVITY, URINE: 1.01 (ref 1–1.03)
SPECIFIC GRAVITY, URINE: 1.01 (ref 1–1.03)
SPECIFIC GRAVITY, URINE: >= 1.03 (ref 1–1.03)
THROAT/NOSE CULTURE: ABNORMAL
THROAT/NOSE CULTURE: ABNORMAL
UROBILINOGEN, URINE: 0.2 EU/DL (ref 0–1)
WBC # BLD: 7.6 THOU/MM3 (ref 4.8–10.8)
WBC # BLD: 9.6 THOU/MM3 (ref 4.8–10.8)
WBC UA: ABNORMAL /HPF
YEAST: ABNORMAL

## 2019-01-01 PROCEDURE — 99213 OFFICE O/P EST LOW 20 MIN: CPT | Performed by: INTERNAL MEDICINE

## 2019-01-01 PROCEDURE — 85730 THROMBOPLASTIN TIME PARTIAL: CPT

## 2019-01-01 PROCEDURE — 81001 URINALYSIS AUTO W/SCOPE: CPT

## 2019-01-01 PROCEDURE — 83036 HEMOGLOBIN GLYCOSYLATED A1C: CPT | Performed by: FAMILY MEDICINE

## 2019-01-01 PROCEDURE — 99212 OFFICE O/P EST SF 10 MIN: CPT | Performed by: FAMILY MEDICINE

## 2019-01-01 PROCEDURE — 30901 CONTROL OF NOSEBLEED: CPT | Performed by: PHYSICIAN ASSISTANT

## 2019-01-01 PROCEDURE — 99202 OFFICE O/P NEW SF 15 MIN: CPT | Performed by: PHYSICIAN ASSISTANT

## 2019-01-01 PROCEDURE — 99212 OFFICE O/P EST SF 10 MIN: CPT | Performed by: PHYSICIAN ASSISTANT

## 2019-01-01 PROCEDURE — 81003 URINALYSIS AUTO W/O SCOPE: CPT | Performed by: NURSE PRACTITIONER

## 2019-01-01 PROCEDURE — 85610 PROTHROMBIN TIME: CPT

## 2019-01-01 PROCEDURE — 82272 OCCULT BLD FECES 1-3 TESTS: CPT

## 2019-01-01 PROCEDURE — 30901 CONTROL OF NOSEBLEED: CPT | Performed by: OTOLARYNGOLOGY

## 2019-01-01 PROCEDURE — 81003 URINALYSIS AUTO W/O SCOPE: CPT | Performed by: UROLOGY

## 2019-01-01 PROCEDURE — 99213 OFFICE O/P EST LOW 20 MIN: CPT | Performed by: NURSE PRACTITIONER

## 2019-01-01 PROCEDURE — 36415 COLL VENOUS BLD VENIPUNCTURE: CPT

## 2019-01-01 PROCEDURE — 80048 BASIC METABOLIC PNL TOTAL CA: CPT

## 2019-01-01 PROCEDURE — 99213 OFFICE O/P EST LOW 20 MIN: CPT | Performed by: FAMILY MEDICINE

## 2019-01-01 PROCEDURE — 52000 CYSTOURETHROSCOPY: CPT | Performed by: UROLOGY

## 2019-01-01 PROCEDURE — 85025 COMPLETE CBC W/AUTO DIFF WBC: CPT

## 2019-01-01 PROCEDURE — 2709999900 HC NON-CHARGEABLE SUPPLY

## 2019-01-01 PROCEDURE — 99284 EMERGENCY DEPT VISIT MOD MDM: CPT

## 2019-01-01 RX ORDER — AMOXICILLIN AND CLAVULANATE POTASSIUM 875; 125 MG/1; MG/1
1 TABLET, FILM COATED ORAL 2 TIMES DAILY
Qty: 10 TABLET | Refills: 0 | Status: SHIPPED | OUTPATIENT
Start: 2019-01-01 | End: 2019-01-01

## 2019-01-01 RX ORDER — LEVOTHYROXINE SODIUM 0.07 MG/1
TABLET ORAL
Qty: 90 TABLET | Refills: 1 | Status: CANCELLED | OUTPATIENT
Start: 2019-01-01

## 2019-01-01 RX ORDER — SERTRALINE HYDROCHLORIDE 100 MG/1
TABLET, FILM COATED ORAL
Qty: 90 TABLET | Refills: 1 | Status: CANCELLED | OUTPATIENT
Start: 2019-01-01

## 2019-01-01 RX ORDER — ATENOLOL 50 MG/1
TABLET ORAL
Qty: 90 TABLET | Refills: 4 | Status: CANCELLED | OUTPATIENT
Start: 2019-01-01

## 2019-01-01 RX ORDER — METOLAZONE 2.5 MG/1
TABLET ORAL
Refills: 0 | Status: ON HOLD | COMMUNITY
Start: 2019-01-01 | End: 2020-01-01 | Stop reason: HOSPADM

## 2019-01-01 RX ORDER — LEVOTHYROXINE SODIUM 0.07 MG/1
TABLET ORAL
Qty: 90 TABLET | Refills: 1 | Status: SHIPPED | OUTPATIENT
Start: 2019-01-01 | End: 2020-01-01

## 2019-01-01 RX ORDER — ATENOLOL 50 MG/1
TABLET ORAL
Qty: 90 TABLET | Refills: 4 | Status: SHIPPED | OUTPATIENT
Start: 2019-01-01

## 2019-01-01 RX ORDER — DEXAMETHASONE 4 MG/1
2 TABLET ORAL PRN
COMMUNITY
Start: 2019-05-04

## 2019-01-01 RX ORDER — SERTRALINE HYDROCHLORIDE 100 MG/1
TABLET, FILM COATED ORAL
Qty: 90 TABLET | Refills: 1 | Status: SHIPPED | OUTPATIENT
Start: 2019-01-01 | End: 2020-01-01

## 2019-01-01 RX ORDER — ISOSORBIDE MONONITRATE 30 MG/1
TABLET, EXTENDED RELEASE ORAL
Status: ON HOLD | COMMUNITY
Start: 2019-05-07 | End: 2020-01-01 | Stop reason: SDUPTHER

## 2019-01-01 RX ORDER — ATORVASTATIN CALCIUM 80 MG/1
TABLET, FILM COATED ORAL
Qty: 90 TABLET | Refills: 1 | Status: SHIPPED | OUTPATIENT
Start: 2019-01-01 | End: 2019-01-01 | Stop reason: SDUPTHER

## 2019-01-01 RX ORDER — RIVAROXABAN 15 MG/1
TABLET, FILM COATED ORAL
COMMUNITY
Start: 2019-01-01 | End: 2019-01-01

## 2019-01-01 RX ORDER — BACITRACIN ZINC AND POLYMYXIN B SULFATE 500; 1000 [USP'U]/G; [USP'U]/G
OINTMENT TOPICAL
Qty: 1 TUBE | Refills: 1 | Status: SHIPPED | OUTPATIENT
Start: 2019-01-01 | End: 2019-01-01

## 2019-01-01 RX ORDER — AMOXICILLIN AND CLAVULANATE POTASSIUM 875; 125 MG/1; MG/1
1 TABLET, FILM COATED ORAL 2 TIMES DAILY
Qty: 28 TABLET | Refills: 0 | Status: SHIPPED | OUTPATIENT
Start: 2019-01-01 | End: 2019-01-01

## 2019-01-01 RX ORDER — ATORVASTATIN CALCIUM 80 MG/1
TABLET, FILM COATED ORAL
Qty: 90 TABLET | Refills: 4 | Status: SHIPPED | OUTPATIENT
Start: 2019-01-01 | End: 2020-01-01 | Stop reason: SDUPTHER

## 2019-01-01 RX ORDER — LOSARTAN POTASSIUM 50 MG/1
50 TABLET ORAL DAILY
Qty: 90 TABLET | Refills: 1 | Status: CANCELLED | OUTPATIENT
Start: 2019-01-01

## 2019-01-01 ASSESSMENT — ENCOUNTER SYMPTOMS
SHORTNESS OF BREATH: 0
COUGH: 0
VOMITING: 0
EYE REDNESS: 0
CHOKING: 0
ABDOMINAL PAIN: 0
RHINORRHEA: 0
RHINORRHEA: 0
VOICE CHANGE: 0
ABDOMINAL PAIN: 0
SORE THROAT: 0
SORE THROAT: 0
FACIAL SWELLING: 0
SHORTNESS OF BREATH: 0
CHEST TIGHTNESS: 0
FACIAL SWELLING: 0
NAUSEA: 0
EYE DISCHARGE: 0
APNEA: 0
NAUSEA: 0
WHEEZING: 0
APNEA: 0
WHEEZING: 0
CHOKING: 0
SINUS PRESSURE: 0
TROUBLE SWALLOWING: 0
COUGH: 0
WHEEZING: 0
VOICE CHANGE: 0
RHINORRHEA: 0
SORE THROAT: 0
STRIDOR: 0
TROUBLE SWALLOWING: 0
APNEA: 0
CHEST TIGHTNESS: 0
COLOR CHANGE: 0
ABDOMINAL PAIN: 0
SINUS PRESSURE: 0
FACIAL SWELLING: 0
SHORTNESS OF BREATH: 0
SORE THROAT: 0
COUGH: 0
TROUBLE SWALLOWING: 0
CHOKING: 0
RHINORRHEA: 0
SHORTNESS OF BREATH: 0
BLOOD IN STOOL: 1
STRIDOR: 0
DIARRHEA: 0
COUGH: 0
STRIDOR: 0
VOMITING: 0
STRIDOR: 0
VOMITING: 0
WHEEZING: 0
VOICE CHANGE: 0
SHORTNESS OF BREATH: 0
SINUS PRESSURE: 0
DIARRHEA: 0
RHINORRHEA: 0
CHEST TIGHTNESS: 0
COLOR CHANGE: 0
CHOKING: 0
VOMITING: 0
DIARRHEA: 0
NAUSEA: 0
SORE THROAT: 0
SINUS PRESSURE: 0
TROUBLE SWALLOWING: 0
NAUSEA: 0
CHEST TIGHTNESS: 0
DIARRHEA: 0
BACK PAIN: 0
APNEA: 0
DIARRHEA: 0
ABDOMINAL PAIN: 0
VOICE CHANGE: 0
COLOR CHANGE: 0
NAUSEA: 0
VOMITING: 0
ABDOMINAL PAIN: 0
FACIAL SWELLING: 0
COLOR CHANGE: 0
COUGH: 0
WHEEZING: 0

## 2019-01-03 ENCOUNTER — CARE COORDINATION (OUTPATIENT)
Dept: CARE COORDINATION | Age: 74
End: 2019-01-03

## 2019-01-14 ENCOUNTER — CARE COORDINATION (OUTPATIENT)
Dept: CARE COORDINATION | Age: 74
End: 2019-01-14

## 2019-01-18 ENCOUNTER — OFFICE VISIT (OUTPATIENT)
Dept: FAMILY MEDICINE CLINIC | Age: 74
End: 2019-01-18
Payer: MEDICARE

## 2019-01-18 VITALS
HEART RATE: 63 BPM | DIASTOLIC BLOOD PRESSURE: 72 MMHG | BODY MASS INDEX: 36.7 KG/M2 | SYSTOLIC BLOOD PRESSURE: 131 MMHG | OXYGEN SATURATION: 97 % | WEIGHT: 271 LBS | TEMPERATURE: 97.6 F | HEIGHT: 72 IN

## 2019-01-18 DIAGNOSIS — E11.65 TYPE 2 DIABETES MELLITUS WITH HYPERGLYCEMIA, UNSPECIFIED WHETHER LONG TERM INSULIN USE (HCC): ICD-10-CM

## 2019-01-18 DIAGNOSIS — I25.10 CORONARY ARTERY DISEASE INVOLVING NATIVE CORONARY ARTERY OF NATIVE HEART, ANGINA PRESENCE UNSPECIFIED: ICD-10-CM

## 2019-01-18 DIAGNOSIS — N18.30 CHRONIC KIDNEY DISEASE, STAGE III (MODERATE) (HCC): ICD-10-CM

## 2019-01-18 DIAGNOSIS — E03.9 ACQUIRED HYPOTHYROIDISM: ICD-10-CM

## 2019-01-18 DIAGNOSIS — I10 ESSENTIAL HYPERTENSION: ICD-10-CM

## 2019-01-18 LAB — HBA1C MFR BLD: 8.9 %

## 2019-01-18 PROCEDURE — 83036 HEMOGLOBIN GLYCOSYLATED A1C: CPT | Performed by: FAMILY MEDICINE

## 2019-01-18 PROCEDURE — 99214 OFFICE O/P EST MOD 30 MIN: CPT | Performed by: FAMILY MEDICINE

## 2019-01-18 RX ORDER — LEVOTHYROXINE SODIUM 0.05 MG/1
TABLET ORAL
Qty: 90 TABLET | Refills: 1 | Status: SHIPPED | OUTPATIENT
Start: 2019-01-18 | End: 2019-02-14 | Stop reason: SDUPTHER

## 2019-01-18 RX ORDER — FLUTICASONE PROPIONATE 50 MCG
1 SPRAY, SUSPENSION (ML) NASAL DAILY
Qty: 1 BOTTLE | Refills: 5 | Status: SHIPPED | OUTPATIENT
Start: 2019-01-18 | End: 2020-01-01

## 2019-01-18 RX ORDER — ATENOLOL 50 MG/1
TABLET ORAL
Qty: 90 TABLET | Refills: 1 | Status: SHIPPED | OUTPATIENT
Start: 2019-01-18 | End: 2019-01-01 | Stop reason: SDUPTHER

## 2019-01-18 RX ORDER — BUMETANIDE 1 MG/1
1 TABLET ORAL DAILY
Qty: 90 TABLET | Refills: 1 | Status: CANCELLED | OUTPATIENT
Start: 2019-01-18

## 2019-01-18 RX ORDER — LOSARTAN POTASSIUM 50 MG/1
50 TABLET ORAL DAILY
Qty: 90 TABLET | Refills: 1 | Status: SHIPPED | OUTPATIENT
Start: 2019-01-18

## 2019-01-18 RX ORDER — SERTRALINE HYDROCHLORIDE 100 MG/1
TABLET, FILM COATED ORAL
Qty: 90 TABLET | Refills: 1 | Status: SHIPPED | OUTPATIENT
Start: 2019-01-18 | End: 2019-01-01 | Stop reason: SDUPTHER

## 2019-02-05 LAB
T4 FREE: 0.7 NG/DL (ref 0.61–1.12)
TSH SERPL DL<=0.05 MIU/L-ACNC: 5.27 MCIU/ML (ref 0.49–4.67)
VITAMIN B-12: 308 PG/ML (ref 180–914)

## 2019-02-06 ENCOUNTER — TELEPHONE (OUTPATIENT)
Dept: FAMILY MEDICINE CLINIC | Age: 74
End: 2019-02-06

## 2019-02-06 DIAGNOSIS — E03.9 HYPOTHYROIDISM, UNSPECIFIED TYPE: Primary | ICD-10-CM

## 2019-02-14 ENCOUNTER — CARE COORDINATION (OUTPATIENT)
Dept: CARE COORDINATION | Age: 74
End: 2019-02-14

## 2019-02-14 DIAGNOSIS — E03.9 ACQUIRED HYPOTHYROIDISM: Primary | ICD-10-CM

## 2019-02-14 RX ORDER — BUMETANIDE 2 MG/1
2 TABLET ORAL 2 TIMES DAILY
Qty: 60 TABLET | Refills: 2 | OUTPATIENT
Start: 2019-02-14

## 2019-02-14 RX ORDER — LEVOTHYROXINE SODIUM 0.07 MG/1
TABLET ORAL
Qty: 90 TABLET | Refills: 1 | Status: SHIPPED | OUTPATIENT
Start: 2019-02-14 | End: 2019-01-01 | Stop reason: SDUPTHER

## 2019-02-14 RX ORDER — LEVOTHYROXINE SODIUM 0.07 MG/1
75 TABLET ORAL DAILY
Qty: 30 TABLET | Refills: 2 | Status: CANCELLED | OUTPATIENT
Start: 2019-02-14

## 2019-02-14 RX ORDER — BUMETANIDE 1 MG/1
1 TABLET ORAL DAILY
Qty: 30 TABLET | Refills: 2 | OUTPATIENT
Start: 2019-02-14

## 2019-02-26 ENCOUNTER — TELEPHONE (OUTPATIENT)
Dept: SURGERY | Age: 74
End: 2019-02-26

## 2019-03-18 ENCOUNTER — OFFICE VISIT (OUTPATIENT)
Dept: FAMILY MEDICINE CLINIC | Age: 74
End: 2019-03-18
Payer: MEDICARE

## 2019-03-18 VITALS
RESPIRATION RATE: 18 BRPM | BODY MASS INDEX: 36.62 KG/M2 | HEART RATE: 82 BPM | HEIGHT: 72 IN | WEIGHT: 270.4 LBS | SYSTOLIC BLOOD PRESSURE: 138 MMHG | TEMPERATURE: 97.9 F | DIASTOLIC BLOOD PRESSURE: 69 MMHG

## 2019-03-18 DIAGNOSIS — E11.65 UNCONTROLLED TYPE 2 DIABETES MELLITUS WITH HYPERGLYCEMIA (HCC): Primary | ICD-10-CM

## 2019-03-18 PROCEDURE — G8510 SCR DEP NEG, NO PLAN REQD: HCPCS | Performed by: FAMILY MEDICINE

## 2019-03-18 PROCEDURE — 99213 OFFICE O/P EST LOW 20 MIN: CPT | Performed by: FAMILY MEDICINE

## 2019-03-18 ASSESSMENT — PATIENT HEALTH QUESTIONNAIRE - PHQ9
SUM OF ALL RESPONSES TO PHQ9 QUESTIONS 1 & 2: 0
2. FEELING DOWN, DEPRESSED OR HOPELESS: 0
1. LITTLE INTEREST OR PLEASURE IN DOING THINGS: 0
SUM OF ALL RESPONSES TO PHQ QUESTIONS 1-9: 0
SUM OF ALL RESPONSES TO PHQ QUESTIONS 1-9: 0

## 2019-03-25 ENCOUNTER — CARE COORDINATION (OUTPATIENT)
Dept: CARE COORDINATION | Age: 74
End: 2019-03-25

## 2019-03-25 ENCOUNTER — OFFICE VISIT (OUTPATIENT)
Dept: PULMONOLOGY | Age: 74
End: 2019-03-25
Payer: MEDICARE

## 2019-03-25 VITALS
DIASTOLIC BLOOD PRESSURE: 68 MMHG | BODY MASS INDEX: 36.03 KG/M2 | OXYGEN SATURATION: 97 % | HEART RATE: 71 BPM | SYSTOLIC BLOOD PRESSURE: 130 MMHG | HEIGHT: 72 IN | TEMPERATURE: 98.5 F | WEIGHT: 266 LBS

## 2019-03-25 DIAGNOSIS — Z99.89 OSA ON CPAP: Primary | ICD-10-CM

## 2019-03-25 DIAGNOSIS — E66.9 OBESITY (BMI 30-39.9): ICD-10-CM

## 2019-03-25 DIAGNOSIS — G47.33 OSA ON CPAP: Primary | ICD-10-CM

## 2019-03-25 PROCEDURE — 99213 OFFICE O/P EST LOW 20 MIN: CPT | Performed by: INTERNAL MEDICINE

## 2019-03-26 ASSESSMENT — ENCOUNTER SYMPTOMS: DYSPNEA ASSOCIATED WITH: EXERTION

## 2019-03-27 ENCOUNTER — NURSE ONLY (OUTPATIENT)
Dept: LAB | Age: 74
End: 2019-03-27

## 2019-03-27 LAB
ANION GAP SERPL CALCULATED.3IONS-SCNC: 20 MEQ/L (ref 8–16)
BACTERIA: ABNORMAL /HPF
BASOPHILS # BLD: 0.5 %
BASOPHILS ABSOLUTE: 0 THOU/MM3 (ref 0–0.1)
BILIRUBIN URINE: NEGATIVE
BLOOD, URINE: ABNORMAL
BUN BLDV-MCNC: 38 MG/DL (ref 7–22)
CALCIUM SERPL-MCNC: 9.7 MG/DL (ref 8.5–10.5)
CASTS 2: ABNORMAL /LPF
CASTS UA: ABNORMAL /LPF
CHARACTER, URINE: CLEAR
CHLORIDE BLD-SCNC: 103 MEQ/L (ref 98–111)
CO2: 19 MEQ/L (ref 23–33)
COLOR: YELLOW
CREAT SERPL-MCNC: 2.4 MG/DL (ref 0.4–1.2)
CREATININE URINE: 62.5 MG/DL
CRYSTALS, UA: ABNORMAL
EOSINOPHIL # BLD: 1.1 %
EOSINOPHILS ABSOLUTE: 0.1 THOU/MM3 (ref 0–0.4)
EPITHELIAL CELLS, UA: ABNORMAL /HPF
ERYTHROCYTE [DISTWIDTH] IN BLOOD BY AUTOMATED COUNT: 13.7 % (ref 11.5–14.5)
ERYTHROCYTE [DISTWIDTH] IN BLOOD BY AUTOMATED COUNT: 49.2 FL (ref 35–45)
GFR SERPL CREATININE-BSD FRML MDRD: 27 ML/MIN/1.73M2
GLUCOSE BLD-MCNC: 77 MG/DL (ref 70–108)
GLUCOSE URINE: NEGATIVE MG/DL
HCT VFR BLD CALC: 45.4 % (ref 42–52)
HEMOGLOBIN: 14.7 GM/DL (ref 14–18)
IMMATURE GRANS (ABS): 0.04 THOU/MM3 (ref 0–0.07)
IMMATURE GRANULOCYTES: 0.5 %
KETONES, URINE: NEGATIVE
LEUKOCYTE ESTERASE, URINE: NEGATIVE
LYMPHOCYTES # BLD: 12.5 %
LYMPHOCYTES ABSOLUTE: 1 THOU/MM3 (ref 1–4.8)
MCH RBC QN AUTO: 31.5 PG (ref 26–33)
MCHC RBC AUTO-ENTMCNC: 32.4 GM/DL (ref 32.2–35.5)
MCV RBC AUTO: 97.2 FL (ref 80–94)
MISCELLANEOUS 2: ABNORMAL
MONOCYTES # BLD: 12 %
MONOCYTES ABSOLUTE: 1 THOU/MM3 (ref 0.4–1.3)
NITRITE, URINE: NEGATIVE
NUCLEATED RED BLOOD CELLS: 0 /100 WBC
PH UA: 5.5 (ref 5–9)
PLATELET # BLD: 237 THOU/MM3 (ref 130–400)
PMV BLD AUTO: 11.2 FL (ref 9.4–12.4)
POTASSIUM SERPL-SCNC: 4.2 MEQ/L (ref 3.5–5.2)
PROT/CREAT RATIO, UR: 2.88
PROTEIN UA: 100
PROTEIN, URINE: 180.1 MG/DL
PTH INTACT: 114.4 PG/ML (ref 15–65)
RBC # BLD: 4.67 MILL/MM3 (ref 4.7–6.1)
RBC URINE: ABNORMAL /HPF
RENAL EPITHELIAL, UA: ABNORMAL
SEG NEUTROPHILS: 73.4 %
SEGMENTED NEUTROPHILS ABSOLUTE COUNT: 5.9 THOU/MM3 (ref 1.8–7.7)
SODIUM BLD-SCNC: 142 MEQ/L (ref 135–145)
SPECIFIC GRAVITY, URINE: 1.01 (ref 1–1.03)
UROBILINOGEN, URINE: 0.2 EU/DL (ref 0–1)
VITAMIN D 25-HYDROXY: 29 NG/ML (ref 30–100)
WBC # BLD: 8 THOU/MM3 (ref 4.8–10.8)
WBC UA: ABNORMAL /HPF
YEAST: ABNORMAL

## 2019-03-28 ENCOUNTER — TELEPHONE (OUTPATIENT)
Dept: SURGERY | Age: 74
End: 2019-03-28

## 2019-04-11 ENCOUNTER — TELEPHONE (OUTPATIENT)
Dept: FAMILY MEDICINE CLINIC | Age: 74
End: 2019-04-11

## 2019-04-17 ENCOUNTER — HOSPITAL ENCOUNTER (OUTPATIENT)
Age: 74
Discharge: HOME | End: 2019-04-20
Attending: INTERNAL MEDICINE | Admitting: INTERNAL MEDICINE
Payer: MEDICARE

## 2019-04-17 DIAGNOSIS — N18.30 CKD (CHRONIC KIDNEY DISEASE), STAGE III (HCC): Primary | ICD-10-CM

## 2019-04-17 LAB
ANION GAP SERPL CALCULATED.3IONS-SCNC: 13 MEQ/L (ref 8–16)
BUN BLDV-MCNC: 42 MG/DL (ref 7–22)
CALCIUM SERPL-MCNC: 8.5 MG/DL (ref 8.5–10.5)
CHLORIDE BLD-SCNC: 108 MEQ/L (ref 98–111)
CO2: 19 MEQ/L (ref 23–33)
CREAT SERPL-MCNC: 2.8 MG/DL (ref 0.4–1.2)
ERYTHROCYTE [DISTWIDTH] IN BLOOD BY AUTOMATED COUNT: 13.4 % (ref 11.5–14.5)
ERYTHROCYTE [DISTWIDTH] IN BLOOD BY AUTOMATED COUNT: 48.7 FL (ref 35–45)
GFR SERPL CREATININE-BSD FRML MDRD: 22 ML/MIN/1.73M2
GLUCOSE BLD-MCNC: 159 MG/DL (ref 70–108)
HCT VFR BLD CALC: 39.8 % (ref 42–52)
HEMOGLOBIN: 12.8 GM/DL (ref 14–18)
MCH RBC QN AUTO: 31.5 PG (ref 26–33)
MCHC RBC AUTO-ENTMCNC: 32.2 GM/DL (ref 32.2–35.5)
MCV RBC AUTO: 98 FL (ref 80–94)
PLATELET # BLD: 216 THOU/MM3 (ref 130–400)
PMV BLD AUTO: 11.6 FL (ref 9.4–12.4)
POTASSIUM SERPL-SCNC: 4.4 MEQ/L (ref 3.5–5.2)
RBC # BLD: 4.06 MILL/MM3 (ref 4.7–6.1)
SODIUM BLD-SCNC: 140 MEQ/L (ref 135–145)
WBC # BLD: 7 THOU/MM3 (ref 4.8–10.8)

## 2019-04-17 PROCEDURE — 6370000000 HC RX 637 (ALT 250 FOR IP): Performed by: INTERNAL MEDICINE

## 2019-04-17 PROCEDURE — 85027 COMPLETE CBC AUTOMATED: CPT

## 2019-04-17 PROCEDURE — 2709999900 HC NON-CHARGEABLE SUPPLY

## 2019-04-17 PROCEDURE — 94640 AIRWAY INHALATION TREATMENT: CPT

## 2019-04-17 PROCEDURE — 2580000003 HC RX 258: Performed by: INTERNAL MEDICINE

## 2019-04-17 PROCEDURE — 80048 BASIC METABOLIC PNL TOTAL CA: CPT

## 2019-04-17 PROCEDURE — 36415 COLL VENOUS BLD VENIPUNCTURE: CPT

## 2019-04-17 RX ORDER — ATORVASTATIN CALCIUM 80 MG/1
80 TABLET, FILM COATED ORAL DAILY
Status: DISCONTINUED | OUTPATIENT
Start: 2019-04-17 | End: 2019-04-20 | Stop reason: HOSPADM

## 2019-04-17 RX ORDER — HYDRALAZINE HYDROCHLORIDE 50 MG/1
50 TABLET, FILM COATED ORAL 3 TIMES DAILY PRN
Status: DISCONTINUED | OUTPATIENT
Start: 2019-04-17 | End: 2019-04-20 | Stop reason: HOSPADM

## 2019-04-17 RX ORDER — LEVOTHYROXINE SODIUM 0.07 MG/1
75 TABLET ORAL DAILY
Status: DISCONTINUED | OUTPATIENT
Start: 2019-04-18 | End: 2019-04-20 | Stop reason: HOSPADM

## 2019-04-17 RX ORDER — SERTRALINE HYDROCHLORIDE 100 MG/1
100 TABLET, FILM COATED ORAL DAILY
Status: DISCONTINUED | OUTPATIENT
Start: 2019-04-18 | End: 2019-04-20 | Stop reason: HOSPADM

## 2019-04-17 RX ORDER — POTASSIUM CHLORIDE 20 MEQ/1
40 TABLET, EXTENDED RELEASE ORAL PRN
Status: DISCONTINUED | OUTPATIENT
Start: 2019-04-17 | End: 2019-04-20 | Stop reason: HOSPADM

## 2019-04-17 RX ORDER — POTASSIUM CHLORIDE 7.45 MG/ML
10 INJECTION INTRAVENOUS PRN
Status: DISCONTINUED | OUTPATIENT
Start: 2019-04-17 | End: 2019-04-20 | Stop reason: HOSPADM

## 2019-04-17 RX ORDER — FLUTICASONE PROPIONATE 50 MCG
1 SPRAY, SUSPENSION (ML) NASAL DAILY
Status: DISCONTINUED | OUTPATIENT
Start: 2019-04-18 | End: 2019-04-20 | Stop reason: HOSPADM

## 2019-04-17 RX ORDER — ALBUTEROL SULFATE 90 UG/1
2 AEROSOL, METERED RESPIRATORY (INHALATION) EVERY 6 HOURS PRN
Status: DISCONTINUED | OUTPATIENT
Start: 2019-04-17 | End: 2019-04-20 | Stop reason: HOSPADM

## 2019-04-17 RX ORDER — ONDANSETRON 2 MG/ML
4 INJECTION INTRAMUSCULAR; INTRAVENOUS EVERY 6 HOURS PRN
Status: DISCONTINUED | OUTPATIENT
Start: 2019-04-17 | End: 2019-04-19 | Stop reason: SDUPTHER

## 2019-04-17 RX ORDER — SODIUM CHLORIDE 0.9 % (FLUSH) 0.9 %
10 SYRINGE (ML) INJECTION PRN
Status: DISCONTINUED | OUTPATIENT
Start: 2019-04-17 | End: 2019-04-19 | Stop reason: SDUPTHER

## 2019-04-17 RX ORDER — ASPIRIN 81 MG/1
81 TABLET ORAL DAILY
Status: DISCONTINUED | OUTPATIENT
Start: 2019-04-18 | End: 2019-04-17 | Stop reason: ALTCHOICE

## 2019-04-17 RX ORDER — FLUTICASONE PROPIONATE 110 UG/1
2 AEROSOL, METERED RESPIRATORY (INHALATION) 2 TIMES DAILY
Status: DISCONTINUED | OUTPATIENT
Start: 2019-04-17 | End: 2019-04-20 | Stop reason: HOSPADM

## 2019-04-17 RX ORDER — INSULIN GLARGINE 100 [IU]/ML
36 INJECTION, SOLUTION SUBCUTANEOUS 2 TIMES DAILY
Status: DISCONTINUED | OUTPATIENT
Start: 2019-04-17 | End: 2019-04-17 | Stop reason: ALTCHOICE

## 2019-04-17 RX ORDER — ASPIRIN 81 MG/1
81 TABLET, CHEWABLE ORAL DAILY
Status: DISCONTINUED | OUTPATIENT
Start: 2019-04-18 | End: 2019-04-20 | Stop reason: HOSPADM

## 2019-04-17 RX ORDER — NITROGLYCERIN 0.4 MG/1
0.4 TABLET SUBLINGUAL EVERY 5 MIN PRN
Status: DISCONTINUED | OUTPATIENT
Start: 2019-04-17 | End: 2019-04-18 | Stop reason: SDUPTHER

## 2019-04-17 RX ORDER — SODIUM CHLORIDE 0.9 % (FLUSH) 0.9 %
10 SYRINGE (ML) INJECTION EVERY 12 HOURS SCHEDULED
Status: DISCONTINUED | OUTPATIENT
Start: 2019-04-17 | End: 2019-04-19 | Stop reason: SDUPTHER

## 2019-04-17 RX ORDER — ATENOLOL 50 MG/1
50 TABLET ORAL DAILY
Status: DISCONTINUED | OUTPATIENT
Start: 2019-04-18 | End: 2019-04-20 | Stop reason: HOSPADM

## 2019-04-17 RX ORDER — LOSARTAN POTASSIUM 50 MG/1
50 TABLET ORAL DAILY
Status: DISCONTINUED | OUTPATIENT
Start: 2019-04-18 | End: 2019-04-20 | Stop reason: HOSPADM

## 2019-04-17 RX ORDER — CALCITRIOL 0.25 UG/1
0.25 CAPSULE, LIQUID FILLED ORAL EVERY OTHER DAY
Status: DISCONTINUED | OUTPATIENT
Start: 2019-04-19 | End: 2019-04-20 | Stop reason: HOSPADM

## 2019-04-17 RX ORDER — SODIUM CHLORIDE 9 MG/ML
INJECTION, SOLUTION INTRAVENOUS CONTINUOUS
Status: DISCONTINUED | OUTPATIENT
Start: 2019-04-17 | End: 2019-04-19 | Stop reason: SDUPTHER

## 2019-04-17 RX ORDER — ATORVASTATIN CALCIUM 40 MG/1
40 TABLET, FILM COATED ORAL NIGHTLY
Status: DISCONTINUED | OUTPATIENT
Start: 2019-04-17 | End: 2019-04-17 | Stop reason: ALTCHOICE

## 2019-04-17 RX ORDER — MULTIVITAMIN WITH FOLIC ACID 400 MCG
1 TABLET ORAL DAILY
Status: DISCONTINUED | OUTPATIENT
Start: 2019-04-18 | End: 2019-04-20 | Stop reason: HOSPADM

## 2019-04-17 RX ADMIN — Medication 2 PUFF: at 20:06

## 2019-04-17 RX ADMIN — ACETYLCYSTEINE 500 MG: 500 TABLET, EFFERVESCENT ORAL at 20:04

## 2019-04-17 RX ADMIN — SODIUM CHLORIDE: 9 INJECTION, SOLUTION INTRAVENOUS at 18:35

## 2019-04-17 RX ADMIN — ATORVASTATIN CALCIUM 80 MG: 80 TABLET, FILM COATED ORAL at 20:04

## 2019-04-17 ASSESSMENT — PAIN SCALES - GENERAL
PAINLEVEL_OUTOF10: 0
PAINLEVEL_OUTOF10: 0

## 2019-04-18 ENCOUNTER — TELEPHONE (OUTPATIENT)
Dept: FAMILY MEDICINE CLINIC | Age: 74
End: 2019-04-18

## 2019-04-18 LAB
ABO CHECK: NORMAL
ALBUMIN SERPL-MCNC: 3.5 G/DL (ref 3.5–5.1)
ALP BLD-CCNC: 107 U/L (ref 38–126)
ALT SERPL-CCNC: 17 U/L (ref 11–66)
ANION GAP SERPL CALCULATED.3IONS-SCNC: 12 MEQ/L (ref 8–16)
AST SERPL-CCNC: 20 U/L (ref 5–40)
BILIRUB SERPL-MCNC: 0.3 MG/DL (ref 0.3–1.2)
BILIRUBIN DIRECT: < 0.2 MG/DL (ref 0–0.3)
BUN BLDV-MCNC: 41 MG/DL (ref 7–22)
CALCIUM SERPL-MCNC: 8.4 MG/DL (ref 8.5–10.5)
CHLORIDE BLD-SCNC: 109 MEQ/L (ref 98–111)
CHOLESTEROL, TOTAL: 100 MG/DL (ref 100–199)
CO2: 18 MEQ/L (ref 23–33)
CREAT SERPL-MCNC: 2.5 MG/DL (ref 0.4–1.2)
EKG ATRIAL RATE: 64 BPM
EKG Q-T INTERVAL: 462 MS
EKG QRS DURATION: 102 MS
EKG QTC CALCULATION (BAZETT): 476 MS
EKG R AXIS: 69 DEGREES
EKG T AXIS: -128 DEGREES
EKG VENTRICULAR RATE: 64 BPM
ERYTHROCYTE [DISTWIDTH] IN BLOOD BY AUTOMATED COUNT: 13.6 % (ref 11.5–14.5)
ERYTHROCYTE [DISTWIDTH] IN BLOOD BY AUTOMATED COUNT: 48 FL (ref 35–45)
GFR SERPL CREATININE-BSD FRML MDRD: 25 ML/MIN/1.73M2
GLUCOSE BLD-MCNC: 139 MG/DL (ref 70–108)
HCT VFR BLD CALC: 39.4 % (ref 42–52)
HDLC SERPL-MCNC: 30 MG/DL
HEMOGLOBIN: 12.9 GM/DL (ref 14–18)
INR BLD: 1.21 (ref 0.85–1.13)
LDL CHOLESTEROL CALCULATED: 53 MG/DL
MCH RBC QN AUTO: 31.5 PG (ref 26–33)
MCHC RBC AUTO-ENTMCNC: 32.7 GM/DL (ref 32.2–35.5)
MCV RBC AUTO: 96.1 FL (ref 80–94)
PLATELET # BLD: 195 THOU/MM3 (ref 130–400)
PMV BLD AUTO: 11.4 FL (ref 9.4–12.4)
POTASSIUM REFLEX MAGNESIUM: 4 MEQ/L (ref 3.5–5.2)
RBC # BLD: 4.1 MILL/MM3 (ref 4.7–6.1)
RH FACTOR: NORMAL
SELECTED GEL ANTIBODY SCREEN: NORMAL
SODIUM BLD-SCNC: 139 MEQ/L (ref 135–145)
TOTAL PROTEIN: 6.2 G/DL (ref 6.1–8)
TRIGL SERPL-MCNC: 83 MG/DL (ref 0–199)
WBC # BLD: 6.4 THOU/MM3 (ref 4.8–10.8)

## 2019-04-18 PROCEDURE — 85027 COMPLETE CBC AUTOMATED: CPT

## 2019-04-18 PROCEDURE — 2500000003 HC RX 250 WO HCPCS

## 2019-04-18 PROCEDURE — 86901 BLOOD TYPING SEROLOGIC RH(D): CPT

## 2019-04-18 PROCEDURE — C1769 GUIDE WIRE: HCPCS

## 2019-04-18 PROCEDURE — 80076 HEPATIC FUNCTION PANEL: CPT

## 2019-04-18 PROCEDURE — 80061 LIPID PANEL: CPT

## 2019-04-18 PROCEDURE — 6370000000 HC RX 637 (ALT 250 FOR IP): Performed by: INTERNAL MEDICINE

## 2019-04-18 PROCEDURE — 2709999900 HC NON-CHARGEABLE SUPPLY

## 2019-04-18 PROCEDURE — 93010 ELECTROCARDIOGRAM REPORT: CPT | Performed by: INTERNAL MEDICINE

## 2019-04-18 PROCEDURE — C1894 INTRO/SHEATH, NON-LASER: HCPCS

## 2019-04-18 PROCEDURE — 94640 AIRWAY INHALATION TREATMENT: CPT

## 2019-04-18 PROCEDURE — 93005 ELECTROCARDIOGRAM TRACING: CPT | Performed by: INTERNAL MEDICINE

## 2019-04-18 PROCEDURE — 86900 BLOOD TYPING SEROLOGIC ABO: CPT

## 2019-04-18 PROCEDURE — 86885 COOMBS TEST INDIRECT QUAL: CPT

## 2019-04-18 PROCEDURE — 85610 PROTHROMBIN TIME: CPT

## 2019-04-18 PROCEDURE — 36415 COLL VENOUS BLD VENIPUNCTURE: CPT

## 2019-04-18 PROCEDURE — C1760 CLOSURE DEV, VASC: HCPCS

## 2019-04-18 PROCEDURE — 6360000002 HC RX W HCPCS

## 2019-04-18 PROCEDURE — 80053 COMPREHEN METABOLIC PANEL: CPT

## 2019-04-18 RX ORDER — NITROGLYCERIN 0.4 MG/1
0.4 TABLET SUBLINGUAL EVERY 5 MIN PRN
Status: DISCONTINUED | OUTPATIENT
Start: 2019-04-18 | End: 2019-04-20 | Stop reason: HOSPADM

## 2019-04-18 RX ORDER — ALPRAZOLAM 0.5 MG/1
0.5 TABLET ORAL
Status: ACTIVE | OUTPATIENT
Start: 2019-04-18 | End: 2019-04-18

## 2019-04-18 RX ORDER — ASPIRIN 325 MG
325 TABLET ORAL ONCE
Status: DISCONTINUED | OUTPATIENT
Start: 2019-04-18 | End: 2019-04-20 | Stop reason: HOSPADM

## 2019-04-18 RX ORDER — DIPHENHYDRAMINE HCL 25 MG
50 TABLET ORAL ONCE
Status: DISCONTINUED | OUTPATIENT
Start: 2019-04-18 | End: 2019-04-20 | Stop reason: HOSPADM

## 2019-04-18 RX ORDER — DIPHENHYDRAMINE HCL 25 MG
25 TABLET ORAL
Status: ACTIVE | OUTPATIENT
Start: 2019-04-18 | End: 2019-04-18

## 2019-04-18 RX ADMIN — SERTRALINE 100 MG: 100 TABLET, FILM COATED ORAL at 07:40

## 2019-04-18 RX ADMIN — ATORVASTATIN CALCIUM 80 MG: 80 TABLET, FILM COATED ORAL at 19:52

## 2019-04-18 RX ADMIN — LOSARTAN POTASSIUM 50 MG: 50 TABLET, FILM COATED ORAL at 07:40

## 2019-04-18 RX ADMIN — THERA TABS 1 TABLET: TAB at 07:40

## 2019-04-18 RX ADMIN — ASPIRIN 81 MG 81 MG: 81 TABLET ORAL at 07:25

## 2019-04-18 RX ADMIN — Medication 2 PUFF: at 20:13

## 2019-04-18 RX ADMIN — ACETYLCYSTEINE 500 MG: 500 TABLET, EFFERVESCENT ORAL at 19:52

## 2019-04-18 RX ADMIN — ACETYLCYSTEINE 500 MG: 500 TABLET, EFFERVESCENT ORAL at 07:25

## 2019-04-18 RX ADMIN — LEVOTHYROXINE SODIUM 75 MCG: 75 TABLET ORAL at 07:40

## 2019-04-18 RX ADMIN — Medication 2 PUFF: at 09:16

## 2019-04-18 RX ADMIN — INSULIN LISPRO 15 UNITS: 100 INJECTION, SOLUTION INTRAVENOUS; SUBCUTANEOUS at 18:19

## 2019-04-18 ASSESSMENT — PAIN SCALES - GENERAL: PAINLEVEL_OUTOF10: 0

## 2019-04-18 NOTE — H&P
[address] [address]   Sedation/Analgesia History and Physical    Pt Name: Katheryn Esparza  MRN: [id number]  YOB: 1945  Provider Performing Procedure: Ryder Nickerson MD  Primary Care Physician: Mouna Rodriges MD      Pre-Procedure: Chest pain, possible coronary artery disease, abnormal stress test    Consent: I have discussed with the patient and/or the patient representative the indication, alternatives, and the possible risks and/or complications of the planned procedure and the anesthesia methods. The patient and/or representative appear to understand and agree to proceed. Medical History:   has a past medical history of Angina pectoris ([address] Blood circulation, collateral, CAD (coronary artery disease), Cancer ([address] Cancer ([address] Carotid artery disease ([address] Cerebral artery occlusion with cerebral infarction ([address] CHF (congestive heart failure) ([address] CKD (chronic kidney disease), stage III ([address] Detached retina, Diabetes mellitus ([address] Diabetic nephropathy/sclerosis ([address] Diverticula of colon, GERD (gastroesophageal reflux disease), Hearing loss, History of blood transfusion, Hyperlipidemia, Hypertension, Hypothyroidism, Peripheral vascular disease ([address] Pneumonia, Prostate cancer ([address] Retinopathy due to secondary diabetes ([address] Tobacco abuse, Type II or unspecified type diabetes mellitus without mention of complication, not stated as uncontrolled, Vision blurred, Vitreous hemorrhage of right eye ([address] and Wears glasses. .    Surgical History:     has a past surgical history that includes Tonsillectomy (child); Leg Surgery (1999); Appendectomy (1967); Cholecystectomy (1990); hernia repair (1989); retinal laser (December 2013); retinal laser; malignant skin lesion excision (2011); Refractive surgery (Bilateral); vitrectomy (2/13/14); Cardiac surgery (1989, 1999); Cardiac catheterization; other surgical history; vitrectomy (Right, 03/06/14); eye surgery (Feb and March 2014);  Pacemaker insertion (2014); Cataract removal with implant (Bilateral, October 13, right; Oct 27, left eye); Colonoscopy (9/19/2006, 2015); vascular surgery; Endoscopy, colon, diagnostic; pacemaker placement; Prostatectomy (07/27/2016); Coronary artery bypass graft (1989); Coronary artery bypass graft (1999); Coronary artery bypass graft (08/23/2016); Upper gastrointestinal endoscopy (2017); and skin biopsy. Allergies: Allergies as of 04/17/2019 - Review Complete 04/17/2019   Allergen Reaction Noted    Tape Higinio Fletcher tape] Other (See Comments) 08/01/2012       Medications:   Coumadin use last 5 days:  No  Antiplatelet drug therapy use last 5 days:  Yes  Other anticoagulant use last 5 days:  No   diphenhydrAMINE  50 mg Oral Once    hydrocortisone sodium succinate PF  200 mg Intravenous Once    aspirin  325 mg Oral Once    sodium chloride flush  10 mL Intravenous 2 times per day    aspirin  81 mg Oral Daily    Acetylcysteine  500 mg Oral BID    atenolol  50 mg Oral Daily    atorvastatin  80 mg Oral Daily    [START ON 4/19/2019] calcitRIOL  0.25 mcg Oral Every Other Day    fluticasone  1 spray Nasal Daily    fluticasone  2 puff Inhalation BID    levothyroxine  75 mcg Oral Daily    losartan  50 mg Oral Daily    multivitamin  1 tablet Oral Daily    rivaroxaban  10 mg Oral Daily    sertraline  100 mg Oral Daily    Liraglutide  1.8 mg Subcutaneous Daily    insulin glargine  36 Units Subcutaneous BID    insulin lispro  1-20 Units Subcutaneous TID WC     Prior to Admission medications    Medication Sig Start Date End Date Taking?  Authorizing Provider   levothyroxine (SYNTHROID) 75 MCG tablet TAKE 1 TABLET PO DAILY 2/14/19  Yes ZHANNA May - CNP   atenolol (TENORMIN) 50 MG tablet TAKE 1 TABLET DAILY 1/18/19  Yes Erica Suazo MD   fluticasone (FLONASE) 50 MCG/ACT nasal spray 1 spray by Nasal route daily 1/18/19  Yes Erica Suazo MD   insulin lispro (HUMALOG KWIKPEN) 100 UNIT/ML pen INJECT PER SLIDING SCALE THREE TIMES A DAY BEFORE MEALS (MAX OF 80 UNITS DAILY) 1/18/19  Yes Vernell Jose MD   insulin glargine (LANTUS SOLOSTAR) 100 UNIT/ML injection pen Inject SQ 36Units in am, 36U in pm 1/18/19  Yes Vernell Jose MD   losartan (COZAAR) 50 MG tablet Take 1 tablet by mouth daily 1/18/19  Yes Vernell Jose MD   sertraline (ZOLOFT) 100 MG tablet TAKE 1 TABLET DAILY 1/18/19  Yes Vernell Jose MD   VICTOZA 18 MG/3ML SOPN SC injection INJECT 1.8 MG INTO THE SKIN DAILY 12/26/18  Yes ZHANNA Riddle - CNP   atorvastatin (LIPITOR) 80 MG tablet TAKE 1 TABLET DAILY 12/11/18  Yes Foye Saint, MD   rivaroxaban (XARELTO) 10 MG TABS tablet Take 10 mg by mouth Pt starting 8/7-d/c effient 8/7/18  Yes Historical Provider, MD   potassium chloride (KLOR-CON M) 10 MEQ extended release tablet Take 1 tablet by mouth 2 times daily 5/27/18  Yes Ammon Foss MD   fluticasone (FLOVENT HFA) 110 MCG/ACT inhaler Inhale 2 puffs into the lungs 2 times daily Rinse mouth after its use. 5/27/18 5/27/19 Yes Josafat Walden MD   hydrALAZINE (APRESOLINE) 50 MG tablet Take 50 mg by mouth 3 times daily as needed Take for systolic BP greater than 180   Yes Historical Provider, MD   aspirin 81 MG EC tablet Take 81 mg by mouth daily   Yes Historical Provider, MD   calcitRIOL (ROCALTROL) 0.25 MCG capsule Take 0.25 mcg by mouth every other day    Yes Historical Provider, MD   bumetanide (BUMEX) 2 MG tablet Take 1 tablet by mouth 2 times daily  5/1/17  Yes Gabbi Montana MD   Multiple Vitamin (MULTI-VITAMIN) TABS   Take 1 tablet by mouth daily    Yes Historical Provider, MD   CPAP Machine MISC by Does not apply route Please change APAP pressure to 12 to 20 cm H20. 7/17/18   Keiko Munguia MD   nitroGLYCERIN (NITROLINGUAL) 0.4 MG/SPRAY 0.4 mg spray Place 1 spray under the tongue as needed 12/28/17   Historical Provider, MD   albuterol sulfate HFA (PROVENTIL HFA) 108 (90 BASE) MCG/ACT inhaler Inhale 2 puffs into the lungs every 6 hours as needed for Wheezing 8/22/16   Katie Lo PA-C   glucose blood VI test strips (ACCU-CHEK TERESA PLUS) strip Check BS 4 times a day 4/19/16   Geraldo Brantley MD   B-D ULTRAFINE III SHORT PEN 31G X 8 MM MISC USE TO INJECT INSULIN UNDER THE SKIN FOUR TIMES A DAY 8/20/15   ZHANNA Swenson - CNS   glucagon 1 MG injection Inject 1 mg into the skin See Admin Instructions. Follow package directions for low blood sugar. 10/11/12   Geraldo Brantley MD       Vital Signs  Vitals:    04/18/19 0350   BP: (!) 161/83   Pulse:    Resp:    Temp:    SpO2:        Physical:  Heart:  regular rate and rhythm  Lungs:  Clear  Abdomen:  Soft  Mental Status:  Alert and Oriented    Planned Procedure:  Left Heart Cath and Possible Percutaneous Coronary Intervention    Sedation/ Anesthesia Plan: Midazolam and Sublimaze    ASA Classification: Class 3 - A patient with severe systemic disease that limits activity but is not incapacitating    Mallampati Airway Classification: II (soft palate, uvula, fauces visible)    · Pre-procedure diagnostic studies complete and results available. · Previous sedation/anesthesia experiences assessed. · The patient is an appropriate candidate to undergo the planned procedure sedation and anesthesia. (Refer to nursing sedation/analgesia documentation record)  · Formulation and discussion of sedation/procedure plan, risks, and expectations with patient and/or responsible adult completed. · Patient examined immediately prior to the procedure.  (Refer to nursing sedation/analgesia documentation record)    Bebo Graves MD  Electronically signed 4/18/2019 at 6:54 AM  Patient Name: Juice Carter   Medical Record Number: [id number]  Date: 4/18/2019   Time: 6:54 AM   Room/Bed: [facility]21/021-

## 2019-04-18 NOTE — PROGRESS NOTES
Inpatient Cardiac Rehabilitation Consult    Received consult for Phase II Cardiac Rehabilitation. We will continue to follow.

## 2019-04-18 NOTE — CARE COORDINATION
4/18/19, 9:24 AM    Discharge plan discussed by  and . Discharge plan reviewed with patient/ family. Patient/ family verbalize understanding of discharge plan and are in agreement with plan. Understanding was demonstrated using the teach back method. Met with pt today. He was admitted for prehydration for cardiac cath today. He is from home with spouse and no services. He does use a C-Pap. He has a PCP, he drives and he has no issues getting his medications. He denies discharge needs.

## 2019-04-19 ENCOUNTER — APPOINTMENT (OUTPATIENT)
Dept: CARDIAC CATH/INVASIVE PROCEDURES | Age: 74
End: 2019-04-19
Attending: INTERNAL MEDICINE
Payer: MEDICARE

## 2019-04-19 LAB
ANION GAP SERPL CALCULATED.3IONS-SCNC: 13 MEQ/L (ref 8–16)
BUN BLDV-MCNC: 39 MG/DL (ref 7–22)
CALCIUM SERPL-MCNC: 8.3 MG/DL (ref 8.5–10.5)
CHLORIDE BLD-SCNC: 112 MEQ/L (ref 98–111)
CO2: 17 MEQ/L (ref 23–33)
CREAT SERPL-MCNC: 2.1 MG/DL (ref 0.4–1.2)
ERYTHROCYTE [DISTWIDTH] IN BLOOD BY AUTOMATED COUNT: 13.6 % (ref 11.5–14.5)
ERYTHROCYTE [DISTWIDTH] IN BLOOD BY AUTOMATED COUNT: 48.6 FL (ref 35–45)
GFR SERPL CREATININE-BSD FRML MDRD: 31 ML/MIN/1.73M2
GLUCOSE BLD-MCNC: 106 MG/DL (ref 70–108)
HCT VFR BLD CALC: 42.9 % (ref 42–52)
HEMOGLOBIN: 14 GM/DL (ref 14–18)
MCH RBC QN AUTO: 31.7 PG (ref 26–33)
MCHC RBC AUTO-ENTMCNC: 32.6 GM/DL (ref 32.2–35.5)
MCV RBC AUTO: 97.1 FL (ref 80–94)
PLATELET # BLD: 194 THOU/MM3 (ref 130–400)
PMV BLD AUTO: 11.1 FL (ref 9.4–12.4)
POTASSIUM SERPL-SCNC: 4.3 MEQ/L (ref 3.5–5.2)
RBC # BLD: 4.42 MILL/MM3 (ref 4.7–6.1)
REASON FOR REJECTION: NORMAL
REJECTED TEST: NORMAL
SODIUM BLD-SCNC: 142 MEQ/L (ref 135–145)
WBC # BLD: 8.2 THOU/MM3 (ref 4.8–10.8)

## 2019-04-19 PROCEDURE — 2500000003 HC RX 250 WO HCPCS

## 2019-04-19 PROCEDURE — 94640 AIRWAY INHALATION TREATMENT: CPT

## 2019-04-19 PROCEDURE — C1894 INTRO/SHEATH, NON-LASER: HCPCS

## 2019-04-19 PROCEDURE — 93459 L HRT ART/GRFT ANGIO: CPT | Performed by: INTERNAL MEDICINE

## 2019-04-19 PROCEDURE — 6360000004 HC RX CONTRAST MEDICATION: Performed by: INTERNAL MEDICINE

## 2019-04-19 PROCEDURE — 2580000003 HC RX 258: Performed by: INTERNAL MEDICINE

## 2019-04-19 PROCEDURE — 6370000000 HC RX 637 (ALT 250 FOR IP): Performed by: INTERNAL MEDICINE

## 2019-04-19 PROCEDURE — 85027 COMPLETE CBC AUTOMATED: CPT

## 2019-04-19 PROCEDURE — C1769 GUIDE WIRE: HCPCS

## 2019-04-19 PROCEDURE — 2709999900 HC NON-CHARGEABLE SUPPLY

## 2019-04-19 PROCEDURE — 6360000002 HC RX W HCPCS

## 2019-04-19 PROCEDURE — C1760 CLOSURE DEV, VASC: HCPCS

## 2019-04-19 PROCEDURE — 36415 COLL VENOUS BLD VENIPUNCTURE: CPT

## 2019-04-19 PROCEDURE — 80048 BASIC METABOLIC PNL TOTAL CA: CPT

## 2019-04-19 RX ORDER — ONDANSETRON 2 MG/ML
4 INJECTION INTRAMUSCULAR; INTRAVENOUS EVERY 6 HOURS PRN
Status: DISCONTINUED | OUTPATIENT
Start: 2019-04-19 | End: 2019-04-20 | Stop reason: HOSPADM

## 2019-04-19 RX ORDER — ACETAMINOPHEN 325 MG/1
650 TABLET ORAL EVERY 4 HOURS PRN
Status: DISCONTINUED | OUTPATIENT
Start: 2019-04-19 | End: 2019-04-20 | Stop reason: HOSPADM

## 2019-04-19 RX ORDER — ATROPINE SULFATE 0.4 MG/ML
0.5 AMPUL (ML) INJECTION
Status: ACTIVE | OUTPATIENT
Start: 2019-04-19 | End: 2019-04-19

## 2019-04-19 RX ORDER — SODIUM CHLORIDE 9 MG/ML
100 INJECTION, SOLUTION INTRAVENOUS CONTINUOUS
Status: ACTIVE | OUTPATIENT
Start: 2019-04-19 | End: 2019-04-19

## 2019-04-19 RX ORDER — SODIUM CHLORIDE 0.9 % (FLUSH) 0.9 %
10 SYRINGE (ML) INJECTION PRN
Status: DISCONTINUED | OUTPATIENT
Start: 2019-04-19 | End: 2019-04-20 | Stop reason: HOSPADM

## 2019-04-19 RX ORDER — SODIUM CHLORIDE 0.9 % (FLUSH) 0.9 %
10 SYRINGE (ML) INJECTION EVERY 12 HOURS SCHEDULED
Status: DISCONTINUED | OUTPATIENT
Start: 2019-04-19 | End: 2019-04-20 | Stop reason: HOSPADM

## 2019-04-19 RX ADMIN — ACETYLCYSTEINE 500 MG: 500 TABLET, EFFERVESCENT ORAL at 05:13

## 2019-04-19 RX ADMIN — ASPIRIN 81 MG 81 MG: 81 TABLET ORAL at 05:12

## 2019-04-19 RX ADMIN — SODIUM CHLORIDE 100 ML/HR: 9 INJECTION, SOLUTION INTRAVENOUS at 09:53

## 2019-04-19 RX ADMIN — IOPAMIDOL 115 ML: 755 INJECTION, SOLUTION INTRAVENOUS at 08:29

## 2019-04-19 RX ADMIN — SERTRALINE 100 MG: 100 TABLET, FILM COATED ORAL at 11:53

## 2019-04-19 RX ADMIN — RIVAROXABAN 10 MG: 10 TABLET, FILM COATED ORAL at 19:10

## 2019-04-19 RX ADMIN — ATENOLOL 50 MG: 50 TABLET ORAL at 11:53

## 2019-04-19 RX ADMIN — LEVOTHYROXINE SODIUM 75 MCG: 75 TABLET ORAL at 05:12

## 2019-04-19 RX ADMIN — LOSARTAN POTASSIUM 50 MG: 50 TABLET, FILM COATED ORAL at 11:54

## 2019-04-19 RX ADMIN — Medication 2 PUFF: at 19:59

## 2019-04-19 RX ADMIN — THERA TABS 1 TABLET: TAB at 11:53

## 2019-04-19 RX ADMIN — ACETYLCYSTEINE 500 MG: 500 TABLET, EFFERVESCENT ORAL at 20:11

## 2019-04-19 RX ADMIN — FLUTICASONE PROPIONATE 1 SPRAY: 50 SPRAY, METERED NASAL at 11:53

## 2019-04-19 RX ADMIN — CALCITRIOL 0.25 MCG: 0.25 CAPSULE ORAL at 11:53

## 2019-04-19 RX ADMIN — SODIUM CHLORIDE: 9 INJECTION, SOLUTION INTRAVENOUS at 00:39

## 2019-04-19 RX ADMIN — ATORVASTATIN CALCIUM 80 MG: 80 TABLET, FILM COATED ORAL at 20:11

## 2019-04-19 RX ADMIN — INSULIN LISPRO 12 UNITS: 100 INJECTION, SOLUTION INTRAVENOUS; SUBCUTANEOUS at 19:09

## 2019-04-19 RX ADMIN — Medication 10 ML: at 20:12

## 2019-04-19 ASSESSMENT — PAIN SCALES - GENERAL
PAINLEVEL_OUTOF10: 0

## 2019-04-19 NOTE — OP NOTE
[address] [address]  Sedation/Analgesia Post Sedation Record      Pt Name: Renato Younger  MRN: [id number]  YOB: 1945  Procedure Performed By: Selin Duarte MD  Primary Care Physician: Kavita Torre MD    POST-PROCEDURE    Physician: Selin Duarte MD    Procedure Performed:   svg and lima visulisation and Left Heart Cath    Sedation/Anesthesia:  Local Anesthesia and IV Conscious Sedation with continuous O2 monitoring    Estimated Blood Loss:  Minimal    Specimens Removed:  None    Complications:  None     Post Procedure Diagnosis/Findings:  Coronary Artery Disease    Recommendations:  Medical treatment and review films.        Selin Duarte MD  Electronically signed 4/19/2019 at 8:37 AM

## 2019-04-19 NOTE — PROGRESS NOTES
Cardiac rehabilitation referral received. Patient does not have a qualifying diagnosis for cardiac rehab.

## 2019-04-19 NOTE — PROCEDURES
[address] [place] [address]                            CARDIAC CATHETERIZATION    PATIENT NAME: Joseline Rice                     :        1945  MED REC NO:   [id number]                           ROOM:       0021  ACCOUNT NO:   [de-identified]                           ADMIT DATE: 2019  PROVIDER:     Melida Basilio. Ethan Sandoval M.D.    Duong Mckusick:  2019    INDICATION FOR PROCEDURE:  This is a 74-year-old gentleman with a  history of atherosclerotic coronary artery disease. This patient had  prior intervention. The patient has been complaining of chest pain and  shortness of breath. The patient has angina pectoris class III. He had  a stress test which was high-risk scan. He opted to have noncardiac  surgery. The patient has renal failure. He was admitted and he was  hydrated, getting Mucomyst, and he was admitted for heart cath, possible  intervention. The patient understands the procedure, benefits, risks,  alternative methods of treatment, and possible complications and wants  it to be done. PROCEDURES PERFORMED:  1.  IV conscious sedation: This patient was given IV conscious sedation  in incremental dosage by the circulating cath lab RN, monitored by the  cath lab monitor tech under my supervision. Procedure started at 08:00  a.m. and finished at 08:30 a.m. No acute complication from the  procedure. 2.  Left heart cath:  Right femoral artery was cannulated with a  6-Kittitian sheath. Initially started with a 4-Kittitian and switched to the  6-Kittitian. It was hard to cannulate the right femoral artery due to the  extensive amount of calcification and fibrous tissue. Coronary angiogram showed the left main is short, severely diseased. Subtotal stenosis, distal left main at the point of bifurcation with  circumflex and the LAD. The 99% stenosis at the ostium of the  circumflex. Circumflex is a dominant artery. PDA of the circumflex is  a small caliber artery. Multiple small obtuse marginals and ramus were  noticed. About 70% narrowing of the mid circumflex. Diffuse, in-stent  restenosis into the distal circumflex. The LAD has subtotal stenosis at the ostium with severe stenosis in the  mid LAD before the origin of the diagonal artery. Stent visualization  of the LAD was seen. The diagonal artery after the stented area is a small caliber artery. The RCA was totally occluded proximally. The saphenous vein graft to the RCA was totally occluded. The saphenous vein graft to the obtuse marginal has about 50% to 60%  narrowing at the ostium. The branches of the obtuse marginal, small  caliber arteries. LIMA to the LAD was patent with extensive  calcification of the subclavian artery and the LAD itself becomes small  caliber artery with multiple areas of subtotal stenosis throughout its  course. The angiogram of the right iliac artery showed patent right iliac artery  with a good distal runoff and successful deployment of the Angio-Seal  closure device. The patient tolerated the procedure very well. SUMMARY:  1. Severe systolic dysfunction of the left ventricle, ejection fraction  about 25%. 2.  No mitral regurgitation and no gradient across the aortic valve. 3.  Left ventricle end-diastolic pressure is 12.  4.  Total occlusion of the RCA proximally. 5.  LIMA to the LAD was patent with the LAD itself distal to the site of  the anastomosis, is about 1.5 mm in diameter artery, diffusely diseased,  multiple areas involving the distal LAD about 99% stenosis. 6.  Total occlusion of the circumflex of the obtuse marginal.  7.  Subtotal stenosis, distal left main. 8.  Subtotal stenosis of the ostium of the circumflex, circumflex  dominant artery. 9.  Subtotal stenosis of the ostium of the LAD to the diagonal artery. 10.  Successful deployment of the Angio-seal closure device.     The patient tolerated the procedure well. The findings will be reviewed  and discussed with the patient and with his family. He does have very  poor prognosis and he could be considered for a high-risk intervention  of the circumflex, the left main, and the LAD. He could be considered for a LifeVest jacket and an AICD if the  patient's ejection fraction does not improve. The patient has no acute  complication from the procedure however.         Sarita Calvo M.D.    D: 04/19/2019 15:54:01       T: 04/19/2019 17:26:39     [email or web address]  Job#: [id number]     Doc#: [id number]    CC:

## 2019-04-20 VITALS
SYSTOLIC BLOOD PRESSURE: 137 MMHG | TEMPERATURE: 98.3 F | HEIGHT: 72 IN | OXYGEN SATURATION: 95 % | WEIGHT: 277.3 LBS | HEART RATE: 65 BPM | BODY MASS INDEX: 37.56 KG/M2 | DIASTOLIC BLOOD PRESSURE: 71 MMHG | RESPIRATION RATE: 16 BRPM

## 2019-04-20 LAB
ANION GAP SERPL CALCULATED.3IONS-SCNC: 12 MEQ/L (ref 8–16)
BUN BLDV-MCNC: 34 MG/DL (ref 7–22)
CALCIUM SERPL-MCNC: 8.5 MG/DL (ref 8.5–10.5)
CHLORIDE BLD-SCNC: 107 MEQ/L (ref 98–111)
CO2: 19 MEQ/L (ref 23–33)
CREAT SERPL-MCNC: 2.2 MG/DL (ref 0.4–1.2)
ERYTHROCYTE [DISTWIDTH] IN BLOOD BY AUTOMATED COUNT: 13.5 % (ref 11.5–14.5)
ERYTHROCYTE [DISTWIDTH] IN BLOOD BY AUTOMATED COUNT: 46.6 FL (ref 35–45)
GFR SERPL CREATININE-BSD FRML MDRD: 29 ML/MIN/1.73M2
GLUCOSE BLD-MCNC: 83 MG/DL (ref 70–108)
HCT VFR BLD CALC: 38.2 % (ref 42–52)
HEMOGLOBIN: 12.9 GM/DL (ref 14–18)
MCH RBC QN AUTO: 32.1 PG (ref 26–33)
MCHC RBC AUTO-ENTMCNC: 33.8 GM/DL (ref 32.2–35.5)
MCV RBC AUTO: 95 FL (ref 80–94)
PLATELET # BLD: 186 THOU/MM3 (ref 130–400)
PMV BLD AUTO: 10.8 FL (ref 9.4–12.4)
POTASSIUM SERPL-SCNC: 4.3 MEQ/L (ref 3.5–5.2)
RBC # BLD: 4.02 MILL/MM3 (ref 4.7–6.1)
SODIUM BLD-SCNC: 138 MEQ/L (ref 135–145)
WBC # BLD: 7.7 THOU/MM3 (ref 4.8–10.8)

## 2019-04-20 PROCEDURE — 6370000000 HC RX 637 (ALT 250 FOR IP): Performed by: INTERNAL MEDICINE

## 2019-04-20 PROCEDURE — 94760 N-INVAS EAR/PLS OXIMETRY 1: CPT

## 2019-04-20 PROCEDURE — 85027 COMPLETE CBC AUTOMATED: CPT

## 2019-04-20 PROCEDURE — 2580000003 HC RX 258: Performed by: INTERNAL MEDICINE

## 2019-04-20 PROCEDURE — 80048 BASIC METABOLIC PNL TOTAL CA: CPT

## 2019-04-20 PROCEDURE — 94640 AIRWAY INHALATION TREATMENT: CPT

## 2019-04-20 PROCEDURE — 36415 COLL VENOUS BLD VENIPUNCTURE: CPT

## 2019-04-20 RX ORDER — CLOPIDOGREL BISULFATE 75 MG/1
75 TABLET ORAL DAILY
Qty: 30 TABLET | Refills: 3 | Status: SHIPPED | OUTPATIENT
Start: 2019-04-20

## 2019-04-20 RX ADMIN — Medication 10 ML: at 08:13

## 2019-04-20 RX ADMIN — FLUTICASONE PROPIONATE 1 SPRAY: 50 SPRAY, METERED NASAL at 08:13

## 2019-04-20 RX ADMIN — ATENOLOL 50 MG: 50 TABLET ORAL at 08:11

## 2019-04-20 RX ADMIN — Medication 2 PUFF: at 08:24

## 2019-04-20 RX ADMIN — LOSARTAN POTASSIUM 50 MG: 50 TABLET, FILM COATED ORAL at 08:11

## 2019-04-20 RX ADMIN — SERTRALINE 100 MG: 100 TABLET, FILM COATED ORAL at 08:11

## 2019-04-20 RX ADMIN — ACETYLCYSTEINE 500 MG: 500 TABLET, EFFERVESCENT ORAL at 08:11

## 2019-04-20 RX ADMIN — ASPIRIN 81 MG 81 MG: 81 TABLET ORAL at 08:11

## 2019-04-20 RX ADMIN — THERA TABS 1 TABLET: TAB at 08:11

## 2019-04-20 RX ADMIN — LEVOTHYROXINE SODIUM 75 MCG: 75 TABLET ORAL at 05:53

## 2019-04-20 ASSESSMENT — PAIN SCALES - GENERAL: PAINLEVEL_OUTOF10: 0

## 2019-04-20 NOTE — PLAN OF CARE
Problem: Falls - Risk of:  Goal: Will remain free from falls  Description  Will remain free from falls  4/20/2019 1338 by Annie Sheets RN  Note:   No falls throughout shift. Gait steady. Call light within reach. Problem: Discharge Planning:  Goal: Discharged to appropriate level of care  Description  Discharged to appropriate level of care  4/20/2019 1338 by Annie Sheets RN  Note:   Plan to go home with discharge. Problem: Serum Glucose Level - Abnormal:  Goal: Ability to maintain appropriate glucose levels will improve  Description  Ability to maintain appropriate glucose levels will improve  4/20/2019 1338 by Annie Sheets RN  Note:   Blood sugar levels monitored and WNL. Problem: Tissue Perfusion - Cardiopulmonary, Altered:  Goal: Absence of angina  Description  Absence of angina  4/20/2019 1338 by Annie Sheets RN  Note:   Denies chest pain. Goal: Circulatory function within specified parameters  Description  Circulatory function within specified parameters  4/20/2019 1338 by Annie Sheets RN  Note:   Ciculatory function monitored. Goal: Hemodynamic stability will improve  Description  Hemodynamic stability will improve  4/20/2019 1338 by Annie Sheets RN  Note:   VS monitored. Care plan reviewed with patient. Patient verbalize understanding of the plan of care and contribute to goal setting.

## 2019-04-20 NOTE — DISCHARGE SUMMARY
[address] [place] [address]                               DISCHARGE SUMMARY    PATIENT NAME: Amparo Bush                     :        1945  MED REC NO:   [id number]                           ROOM:       0021  ACCOUNT NO:   [de-identified]                           ADMIT DATE: 2019  PROVIDER:     Cata Lowe. Jose Carlos Campos M.D.               Leon Blaise:    FINAL DIAGNOSES:  1. Acute dilated congestive ischemic cardiomyopathy with prior  myocardial infarction. 2.  Angina pectoris. 3.  Atherosclerotic coronary artery disease of native coronary arteries  and bypass grafts. 4.  Diabetes mellitus. 5.  Sleep apnea. 6.  Chronic renal insufficiency. 7.  History of pacemaker implantation. 8.  Paroxysmal atrial fib. 9.  Obesity. PROCEDURE PERFORMED DURING THIS HOSPITALIZATION:  Left heart cath, left  ventriculogram, selective coronary angiogram, saphenous vein graft  visualization, LIMA visualization. BRIEF HISTORY:  This is a patient, 65-year-old gentleman, with a known  history of coronary artery disease, had a history of CABG. The patient  had a history of prior intervention, a history of pacemaker  implantation, history of paroxysmal atrial fib. This patient has been  complaining of shortness of breath and chest pain. The patient did have  a stress test, which was highly abnormal.  The patient was admitted for  a heart cath. HOSPITALIZATION COURSE:  The patient was admitted the day before. He  was placed on IV fluids. He was given the Mucomyst and we had to delay  the procedure as his BUN and creatinine were still elevated and we  waited for creatinine to be below 2. He did have the heart cath, which  showed significant underlying coronary artery disease, probably needs  intervention of the circumflex, the left main, and the LAD. He had an  ejection fraction of about 20% to 25%.     The patient tolerated the procedure

## 2019-04-20 NOTE — PLAN OF CARE
Problem: Falls - Risk of:  Goal: Will remain free from falls  Description  Will remain free from falls  Outcome: Ongoing  Note:   Absence of falls this shift. Fall prevention in place. Fall band applied. Bed alarm activated as needed. Problem: Discharge Planning:  Goal: Discharged to appropriate level of care  Description  Discharged to appropriate level of care  Outcome: Ongoing  Note:   Pt plans to be discharged home when appropriate. Problem: Serum Glucose Level - Abnormal:  Goal: Ability to maintain appropriate glucose levels will improve  Description  Ability to maintain appropriate glucose levels will improve  Outcome: Ongoing  Note:   Blood sugar checked before meals and at bedtime. PRN coverage given as needed. Problem: Tissue Perfusion - Cardiopulmonary, Altered:  Goal: Absence of angina  Description  Absence of angina  Outcome: Ongoing  Note:   Patient free from angina this shift. Cardiac monitor in place. PRN meds given as needed. Problem: Tissue Perfusion - Cardiopulmonary, Altered:  Goal: Circulatory function within specified parameters  Description  Circulatory function within specified parameters  Outcome: Ongoing  Note:   Radial and pedal pulses present bilaterally. Skin warm and dry. Problem: Tissue Perfusion - Cardiopulmonary, Altered:  Goal: Hemodynamic stability will improve  Description  Hemodynamic stability will improve  Outcome: Ongoing  Note:   Vitals stable this shift. Cardiac monitor in place. Care plan reviewed with patient. Patient verbalize understanding of the plan of care and contribute to goal setting.

## 2019-04-22 ENCOUNTER — TELEPHONE (OUTPATIENT)
Dept: PHARMACY | Facility: CLINIC | Age: 74
End: 2019-04-22

## 2019-04-22 ENCOUNTER — TELEPHONE (OUTPATIENT)
Dept: FAMILY MEDICINE CLINIC | Age: 74
End: 2019-04-22

## 2019-04-22 DIAGNOSIS — Z79.899 ENCOUNTER FOR MEDICATION REVIEW: Primary | ICD-10-CM

## 2019-04-22 PROCEDURE — 1111F DSCHRG MED/CURRENT MED MERGE: CPT

## 2019-04-22 NOTE — TELEPHONE ENCOUNTER
CLINICAL PHARMACY NOTE  Post-Discharge Transitions of Care (ADRIANO)    Patient was discharged from [facility] on 4/20/19. Attempted to reach patient to review medications; left message asking for return call. Patient brother answered the phone and stated patient was not home and would not be until Saturday.       Sally Joe, PharmD, [facility] Pharmacist  Direct: [phone]  Toll Free: [phone], Option 7    =======================================================   CLINICAL PHARMACY NOTE   POST-DISCHARGE TELEPHONE FOLLOW-UP ADDENDUM    For Pharmacy Admin Tracking Only    TCM Call Made?: Yes  [address] Patient?: Yes    Time Spent (min): 5

## 2019-04-22 NOTE — TELEPHONE ENCOUNTER
Have the medications prescribed at discharge been filled? Yes  Does the patient understand what the medications are for? Yes  Was the patient given a follow up appointment at discharge? Yes  If no appointment was made at the time of discharge, has the patient scheduled a follow up appointment?  n/a   If yes, confirm appointment date and time   If no, schedule appointment  Has the patient experienced any new symptoms or have previous symptoms worsened? No  Is the patient experiencing any pain? No   If yes, is the pain well controlled? Yes  Does the patient understand all the discharge instructions? Yes  Did someone talk to the patient and/or family about the patient needs prior to being discharged? Yes  Did the patient's doctor communicate well? Yes  Did the patient's nurse communicate well? Yes  Was the patient satisfied with the services and care received at [address] [place] [address]? Yes  Would the patient like someone to follow up about their concerns? No  Was there one particular person or group of persons while at [address] [place] [address] the patient would like to recognize? No  Does the patient have any suggestions on what we could do better? No  Has this call helped the patient feel better about their plan of care?   Yes

## 2019-04-22 NOTE — TELEPHONE ENCOUNTER
04/22/2019 . Transition of Care visit scheduled.   4/24/2019  Patient is being discharged to home  Date of discharge 04/20/2019  Discharge from facility [facility]  Reason for admission acute ischemic cardiomyopathy

## 2019-04-22 NOTE — TELEPHONE ENCOUNTER
[facility] Transitions Initial Follow Up Call    Call within 2 business days of discharge: Yes     Patient: Pau Chandler Patient : [id number]   MRN: [id number]  Reason for Admission: 19  Discharge Date: 19 RARS: Readmission Risk Score: 22       Spoke with: pt    Discharge department/facility: [facility]    Non-face-to-face services provided:  Scheduled appointment with PCP-19    Follow Up  Future Appointments   Date Time Provider Dean Simpson   2019 11:30 AM MD BRANT Plata [facility]   2019 10:00 AM ZHANNA Chaparro - CNP SRPX PEL[facility]   2019  8:30 AM Arnel Hanna MD Pul Med [address] [facility]   2019  9:45 AM Mya Avila [facility] Astrid Gonzalez, [address] [place] [address] ([address] [place] [address])

## 2019-04-23 NOTE — TELEPHONE ENCOUNTER
That is correct Eduar Trujillo,  I tried several meds which did not help Melchor Giles, he decided to stop all of them.

## 2019-04-23 NOTE — TELEPHONE ENCOUNTER
Dr Lena Hawthorne discharge medication review completed with patient today  - patient states his Flovent inhaler was stopped by you, however still on his med list. Patient currently only using albuterol prn with no other inhalers. - Please confirm    Thank you,  Jeet Og, PharmD, [place] Pharmacist  Direct: [phone], Ext [phone]  ===========================================  CLINICAL PHARMACY NOTE  Post-Discharge Transitions of Care (ADRIANO)    Non-face-to-face services provided:  Assessment and support for treatment adherence and medication management-- med rec    Subjective/Objective:  Maikel Shin is a 75 y.o. male. Patient was discharged from [address] [place] [address] on 4/20/19 with a diagnosis of Acute dilated congestive ischemic cardiomyopathy. Patient outreach to review discharge medications and provide medication review and management. Spoke with patient. Is not checking BP at home, needs to get new batteries for his scale. Therefore is not currently checking his weight. Educated on importance of daily weights. Is following a low salt diet. Has follow up apts made. Will get blood work done before Dr. Sada Jarrell apt. Is wearing the life vest. Can not do much without getting short of breath, is taking it easy. Did start the plavix and is taking half tab of xarelto daily. Patient also states his lung doctor stopped his flovent inhaler. Allergies   Allergen Reactions    Tape Brandy Spindle Tape] Other (See Comments)     Rash and skin breakdown       Discharge Medications (as per current medication list/AVS):  There are NEW medications for you.  START taking them after you leave the hospital:  clopidogrel (PLAVIX) 75 MG tablet Take 1 tablet by mouth daily     You told us you were taking these medications at home, but the amount or how often you take this medication has CHANGED:  bumetanide (BUMEX) 2 MG tablet Take 1 tablet by mouth 2 times daily      rivaroxaban (XARELTO) 10 MG TABS tablet Take 0.5 tablets by mouth daily (with breakfast)     These are medications you told us you were taking at home, CONTINUE taking them after you leave the hospital:  Medication Sig    atenolol (TENORMIN) 50 MG tablet TAKE 1 TABLET DAILY    fluticasone (FLONASE) 50 MCG/ACT nasal spray 1 spray by Nasal route daily    atorvastatin (LIPITOR) 80 MG tablet TAKE 1 TABLET DAILY    CPAP Machine MISC by Does not apply route Please change APAP pressure to 12 to 20 cm H20.  fluticasone (FLOVENT HFA) 110 MCG/ACT inhaler Inhale 2 puffs into the lungs 2 times daily Rinse mouth after its use.   - patient states this was discontinued by his lung doctor    aspirin 81 MG EC tablet Take 81 mg by mouth daily    calcitRIOL (ROCALTROL) 0.25 MCG capsule Take 0.25 mcg by mouth every other day     albuterol sulfate HFA (PROVENTIL HFA) 108 (90 BASE) MCG/ACT inhaler Inhale 2 puffs into the lungs every 6 hours as needed for Wheezing    levothyroxine (SYNTHROID) 75 MCG tablet TAKE 1 TABLET PO DAILY    insulin lispro (HUMALOG KWIKPEN) 100 UNIT/ML pen INJECT PER SLIDING SCALE THREE TIMES A DAY BEFORE MEALS (MAX OF 80 UNITS DAILY)  - patient carb counts    insulin glargine (LANTUS SOLOSTAR) 100 UNIT/ML injection pen Inject SQ 36Units in am, 36U in pm    losartan (COZAAR) 50 MG tablet Take 1 tablet by mouth daily    sertraline (ZOLOFT) 100 MG tablet TAKE 1 TABLET DAILY    VICTOZA 18 MG/3ML SOPN SC injection INJECT 1.8 MG INTO THE SKIN DAILY    potassium chloride (KLOR-CON M) 10 MEQ extended release tablet Take 1 tablet by mouth 2 times daily    hydrALAZINE (APRESOLINE) 50 MG tablet Take 50 mg by mouth 3 times daily as needed Take for systolic BP greater than 214    nitroGLYCERIN (NITROLINGUAL) 0.4 MG/SPRAY 0.4 mg spray Place 1 spray under the tongue as needed    glucose blood VI test strips (ACCU-CHEK TERESA PLUS) strip Check BS 4 times a day    B-D ULTRAFINE III SHORT PEN 31G X 8 MM MISC USE TO INJECT INSULIN UNDER THE SKIN FOUR TIMES A DAY    glucagon 1 MG injection Inject 1 mg into the skin See Admin Instructions. Follow package directions for low blood sugar.  Multiple Vitamin (MULTI-VITAMIN) TABS   Take 1 tablet by mouth daily      These are the medications you have told us you were taking at home, STOP taking them after you leave the hospital:  Did stop taking:  EFFIENT 10 MG Tabs  Generic drug: prasugrel    TCM Call Made?: Yes     Additional Medications:  Vit D 5000 units daily    Estimated Creatinine Clearance: 40 mL/min (A) (based on SCr of 2.2 mg/dL (H)). Assessment/Plan:  - Pt is not taking medications as directed by discharging physician. Patient not taking Flovent- need to clarify  Number of discrepancies: 2. Instructions per discharge list provided except per below documentation. Identified medication discrepancies/issues:     · Category 3 (1):  1. Patient states Flovent was stopped by Dr. Amarjit Singh pulmonologist. Dr Yoshi Salgado states yes the flovent was stopped due to patient stating it was not helping him. Removed from med list.   · Category 4 (1):  1. Updated med list- added vit D    - Identified Potential Medication Interactions: The following clinically significant interactions were identified via [employer] Interaction Analysis as category D or higher: xarelto, aspirin, plavix- monitor for bleeding- xarelto dose was decreased to 5 mg this admission. .    - Renal Dosing: No renal adjustments necessary.    - Follow up appointment date (7 days for more severe illness, 14 days for others):    · Patient reminded of upcoming appointment    Thank you,    Marcelle Solano, PharmD, [facility] Pharmacist  Direct: [phone], Ext [phone]  ===============================  CLINICAL PHARMACY NOTE   POST-DISCHARGE [address] Only    TCM Call Made?: Yes  [facility] Select Patient?: Yes  Total # of Interventions Recommended: 1 - Updated Order #:

## 2019-04-24 ENCOUNTER — OFFICE VISIT (OUTPATIENT)
Dept: FAMILY MEDICINE CLINIC | Age: 74
End: 2019-04-24
Payer: MEDICARE

## 2019-04-24 VITALS
HEIGHT: 72 IN | TEMPERATURE: 98.8 F | HEART RATE: 68 BPM | WEIGHT: 273.6 LBS | DIASTOLIC BLOOD PRESSURE: 60 MMHG | BODY MASS INDEX: 37.06 KG/M2 | SYSTOLIC BLOOD PRESSURE: 128 MMHG | OXYGEN SATURATION: 96 %

## 2019-04-24 DIAGNOSIS — N28.9 RENAL INSUFFICIENCY: ICD-10-CM

## 2019-04-24 DIAGNOSIS — I25.708 CORONARY ARTERY DISEASE OF BYPASS GRAFT OF NATIVE HEART WITH STABLE ANGINA PECTORIS (HCC): Primary | ICD-10-CM

## 2019-04-24 PROCEDURE — 99495 TRANSJ CARE MGMT MOD F2F 14D: CPT | Performed by: FAMILY MEDICINE

## 2019-04-24 NOTE — PROGRESS NOTES
Post-Discharge Transitional Care Management Services      Kennedy Styles   YOB: 1945    Date of Visit:  4/24/2019  30 Day Post-Discharge Date: 5-13-19    He was admitted the day before and place on IV fluids. He was given the Mucomyst and the procedure was delay as his BUN and creatinine were still elevated. Once the creatinine was below 2 the procedure was done. Which showed significant underlying coronary artery disease, and Dr. Luís Suarez felt that probably needs intervention of the circumflex, the left main, and the LAD. He had an ejection fraction of about 20% to 25%.   Due to his renal function and the complexity of the procedure, the procedure was stopped. Dr. Luís Suarez discussed with Mr. Maxx Plunkett and his wife  the different options of treatment. He understood it is a high risk procedure. He was placed him on the aspirin and Plavix and was given a LifeVest jacket as he does have an ischemic and dilated cardiomyopathy, prior history of myocardial infarction and CABG. Today he is feeling well. He has angina with moderate physical activity.         Allergies   Allergen Reactions    Tape Elysia Gaines Tape] Other (See Comments)     Rash and skin breakdown     Outpatient Medications Marked as Taking for the 4/24/19 encounter (Office Visit) with Afsaneh Long MD   Medication Sig Dispense Refill    Cholecalciferol (VITAMIN D3) 5000 units TABS Take 5,000 Units by mouth daily      clopidogrel (PLAVIX) 75 MG tablet Take 1 tablet by mouth daily 30 tablet 3    rivaroxaban (XARELTO) 10 MG TABS tablet Take 0.5 tablets by mouth daily (with breakfast) 30 tablet 1    levothyroxine (SYNTHROID) 75 MCG tablet TAKE 1 TABLET PO DAILY 90 tablet 1    atenolol (TENORMIN) 50 MG tablet TAKE 1 TABLET DAILY 90 tablet 1    fluticasone (FLONASE) 50 MCG/ACT nasal spray 1 spray by Nasal route daily 1 Bottle 5    insulin lispro (HUMALOG KWIKPEN) 100 UNIT/ML pen INJECT PER SLIDING SCALE THREE TIMES A DAY BEFORE MEALS (MAX OF Readings from Last 3 Encounters:   04/24/19 273 lb 9.6 oz (124.1 kg)   04/20/19 277 lb 4.8 oz (125.8 kg)   03/25/19 266 lb (120.7 kg)     BP Readings from Last 3 Encounters:   04/24/19 128/60   04/20/19 137/71   03/25/19 130/68        Patient was admitted to [facility]  from 4-17-19 to 4-20-19 for CKD. Inpatient course: Discharge summary reviewed- see chart. Current status: home    Review of Systems:  A comprehensive review of systems was negative except for what was noted in the HPI. Physical Exam:  General Appearance: alert and oriented to person, place and time, well developed and well- nourished, in no acute distress  Skin: warm and dry, no rash or erythema  Head: normocephalic and atraumatic  Eyes: pupils equal, round, and reactive to light, extraocular eye movements intact, conjunctivae normal  ENT: tympanic membrane, external ear and ear canal normal bilaterally, nose without deformity, nasal mucosa and turbinates normal without polyps  Neck: supple and non-tender without mass, no thyromegaly or thyroid nodules, no cervical lymphadenopathy  Pulmonary/Chest: clear to auscultation bilaterally- no wheezes, rales or rhonchi, normal air movement, no respiratory distress  Cardiovascular: normal rate, regular rhythm, normal S1 and S2, no murmurs, rubs, clicks, or gallops, distal pulses intact, no carotid bruits  Abdomen: soft, non-tender, non-distended, normal bowel sounds, no masses or organomegaly  Extremities: no cyanosis, clubbing or edema    Initial post-discharge communication occurred between medical assistant and patient on 4-22-19- see documentation in chart: telephone encounter. Assessment/Plan:  Melchor Giles was seen today for follow-up from hospital and other.     Diagnoses and all orders for this visit:    Coronary artery disease of bypass graft of native heart with stable angina pectoris [facility])    Renal insufficiency          Diagnostic test results reviewed: inpatient labs, EKG and cardiac cath Patient risk of morbidity and mortality: moderate    Medical Decision Making: moderate complexity      Electronically signed by Inag Monzon MD on 4/24/2019 at 4:36 PM

## 2019-05-21 NOTE — CARE COORDINATION
5/21/19  8:57 AM  Received call from hammad, needed to know exact discharge date as patient went home with a lifevest. Patient was not seen by this  during admission but did confirm discharge date on 4/20/19.

## 2019-05-22 ENCOUNTER — HOSPITAL ENCOUNTER (INPATIENT)
Age: 74
LOS: 2 days | Discharge: HOME | DRG: 247 | End: 2019-05-25
Attending: INTERNAL MEDICINE | Admitting: INTERNAL MEDICINE
Payer: MEDICARE

## 2019-05-22 LAB
ABO: NORMAL
ANION GAP SERPL CALCULATED.3IONS-SCNC: 15 MEQ/L (ref 8–16)
BASOPHILS # BLD: 0.4 %
BASOPHILS ABSOLUTE: 0 THOU/MM3 (ref 0–0.1)
BUN BLDV-MCNC: 41 MG/DL (ref 7–22)
CALCIUM SERPL-MCNC: 8.8 MG/DL (ref 8.5–10.5)
CHLORIDE BLD-SCNC: 106 MEQ/L (ref 98–111)
CO2: 18 MEQ/L (ref 23–33)
CREAT SERPL-MCNC: 2.5 MG/DL (ref 0.4–1.2)
EOSINOPHIL # BLD: 1.2 %
EOSINOPHILS ABSOLUTE: 0.1 THOU/MM3 (ref 0–0.4)
ERYTHROCYTE [DISTWIDTH] IN BLOOD BY AUTOMATED COUNT: 13.6 % (ref 11.5–14.5)
ERYTHROCYTE [DISTWIDTH] IN BLOOD BY AUTOMATED COUNT: 46.9 FL (ref 35–45)
GEL INDIRECT COOMBS: NORMAL
GFR SERPL CREATININE-BSD FRML MDRD: 25 ML/MIN/1.73M2
GLUCOSE BLD-MCNC: 262 MG/DL (ref 70–108)
GLUCOSE BLD-MCNC: 267 MG/DL (ref 70–108)
HCT VFR BLD CALC: 38.1 % (ref 42–52)
HEMOGLOBIN: 12.9 GM/DL (ref 14–18)
IMMATURE GRANS (ABS): 0.07 THOU/MM3 (ref 0–0.07)
IMMATURE GRANULOCYTES: 1 %
LYMPHOCYTES # BLD: 14 %
LYMPHOCYTES ABSOLUTE: 1 THOU/MM3 (ref 1–4.8)
MCH RBC QN AUTO: 32 PG (ref 26–33)
MCHC RBC AUTO-ENTMCNC: 33.9 GM/DL (ref 32.2–35.5)
MCV RBC AUTO: 94.5 FL (ref 80–94)
MONOCYTES # BLD: 10.2 %
MONOCYTES ABSOLUTE: 0.7 THOU/MM3 (ref 0.4–1.3)
NUCLEATED RED BLOOD CELLS: 0 /100 WBC
PLATELET # BLD: 202 THOU/MM3 (ref 130–400)
PMV BLD AUTO: 11 FL (ref 9.4–12.4)
POTASSIUM REFLEX MAGNESIUM: 3.9 MEQ/L (ref 3.5–5.2)
RBC # BLD: 4.03 MILL/MM3 (ref 4.7–6.1)
RH FACTOR: NORMAL
SEG NEUTROPHILS: 73.2 %
SEGMENTED NEUTROPHILS ABSOLUTE COUNT: 5.1 THOU/MM3 (ref 1.8–7.7)
SODIUM BLD-SCNC: 139 MEQ/L (ref 135–145)
WBC # BLD: 6.9 THOU/MM3 (ref 4.8–10.8)

## 2019-05-22 PROCEDURE — 6370000000 HC RX 637 (ALT 250 FOR IP): Performed by: INTERNAL MEDICINE

## 2019-05-22 PROCEDURE — 86885 COOMBS TEST INDIRECT QUAL: CPT

## 2019-05-22 PROCEDURE — 6360000002 HC RX W HCPCS: Performed by: INTERNAL MEDICINE

## 2019-05-22 PROCEDURE — 2709999900 HC NON-CHARGEABLE SUPPLY

## 2019-05-22 PROCEDURE — 02703DZ DILATION OF CORONARY ARTERY, ONE ARTERY WITH INTRALUMINAL DEVICE, PERCUTANEOUS APPROACH: ICD-10-PCS | Performed by: INTERNAL MEDICINE

## 2019-05-22 PROCEDURE — 85025 COMPLETE CBC W/AUTO DIFF WBC: CPT

## 2019-05-22 PROCEDURE — 36415 COLL VENOUS BLD VENIPUNCTURE: CPT

## 2019-05-22 PROCEDURE — 86900 BLOOD TYPING SEROLOGIC ABO: CPT

## 2019-05-22 PROCEDURE — 80048 BASIC METABOLIC PNL TOTAL CA: CPT

## 2019-05-22 PROCEDURE — 86901 BLOOD TYPING SEROLOGIC RH(D): CPT

## 2019-05-22 PROCEDURE — 027034Z DILATION OF CORONARY ARTERY, ONE ARTERY WITH DRUG-ELUTING INTRALUMINAL DEVICE, PERCUTANEOUS APPROACH: ICD-10-PCS | Performed by: INTERNAL MEDICINE

## 2019-05-22 PROCEDURE — 2580000003 HC RX 258: Performed by: INTERNAL MEDICINE

## 2019-05-22 PROCEDURE — 82948 REAGENT STRIP/BLOOD GLUCOSE: CPT

## 2019-05-22 PROCEDURE — B41C1ZZ FLUOROSCOPY OF PELVIC ARTERIES USING LOW OSMOLAR CONTRAST: ICD-10-PCS | Performed by: INTERNAL MEDICINE

## 2019-05-22 PROCEDURE — 93005 ELECTROCARDIOGRAM TRACING: CPT | Performed by: INTERNAL MEDICINE

## 2019-05-22 RX ORDER — SODIUM CHLORIDE 0.9 % (FLUSH) 0.9 %
10 SYRINGE (ML) INJECTION PRN
Status: DISCONTINUED | OUTPATIENT
Start: 2019-05-22 | End: 2019-05-24 | Stop reason: SDUPTHER

## 2019-05-22 RX ORDER — DIPHENHYDRAMINE HCL 25 MG
25 TABLET ORAL
Status: DISPENSED | OUTPATIENT
Start: 2019-05-22 | End: 2019-05-22

## 2019-05-22 RX ORDER — BUMETANIDE 1 MG/1
2 TABLET ORAL 2 TIMES DAILY
Status: DISCONTINUED | OUTPATIENT
Start: 2019-05-22 | End: 2019-05-25 | Stop reason: HOSPADM

## 2019-05-22 RX ORDER — INSULIN GLARGINE 100 [IU]/ML
36 INJECTION, SOLUTION SUBCUTANEOUS NIGHTLY
Status: DISCONTINUED | OUTPATIENT
Start: 2019-05-22 | End: 2019-05-25 | Stop reason: HOSPADM

## 2019-05-22 RX ORDER — CALCITRIOL 0.25 UG/1
0.25 CAPSULE, LIQUID FILLED ORAL EVERY OTHER DAY
Status: DISCONTINUED | OUTPATIENT
Start: 2019-05-24 | End: 2019-05-25 | Stop reason: HOSPADM

## 2019-05-22 RX ORDER — HYDRALAZINE HYDROCHLORIDE 50 MG/1
50 TABLET, FILM COATED ORAL 3 TIMES DAILY PRN
Status: DISCONTINUED | OUTPATIENT
Start: 2019-05-22 | End: 2019-05-25 | Stop reason: HOSPADM

## 2019-05-22 RX ORDER — CLOPIDOGREL BISULFATE 75 MG/1
75 TABLET ORAL DAILY
Status: DISCONTINUED | OUTPATIENT
Start: 2019-05-23 | End: 2019-05-25 | Stop reason: HOSPADM

## 2019-05-22 RX ORDER — POTASSIUM CHLORIDE 20 MEQ/1
10 TABLET, EXTENDED RELEASE ORAL 2 TIMES DAILY
Status: DISCONTINUED | OUTPATIENT
Start: 2019-05-22 | End: 2019-05-25 | Stop reason: HOSPADM

## 2019-05-22 RX ORDER — ATORVASTATIN CALCIUM 80 MG/1
80 TABLET, FILM COATED ORAL NIGHTLY
Status: DISCONTINUED | OUTPATIENT
Start: 2019-05-22 | End: 2019-05-25 | Stop reason: HOSPADM

## 2019-05-22 RX ORDER — ALBUTEROL SULFATE 90 UG/1
2 AEROSOL, METERED RESPIRATORY (INHALATION) EVERY 6 HOURS PRN
Status: DISCONTINUED | OUTPATIENT
Start: 2019-05-22 | End: 2019-05-25 | Stop reason: HOSPADM

## 2019-05-22 RX ORDER — DIPHENHYDRAMINE HCL 25 MG
50 TABLET ORAL ONCE
Status: COMPLETED | OUTPATIENT
Start: 2019-05-22 | End: 2019-05-22

## 2019-05-22 RX ORDER — FLUTICASONE PROPIONATE 50 MCG
1 SPRAY, SUSPENSION (ML) NASAL DAILY
Status: DISCONTINUED | OUTPATIENT
Start: 2019-05-23 | End: 2019-05-25 | Stop reason: HOSPADM

## 2019-05-22 RX ORDER — LOSARTAN POTASSIUM 50 MG/1
50 TABLET ORAL DAILY
Status: DISCONTINUED | OUTPATIENT
Start: 2019-05-23 | End: 2019-05-23

## 2019-05-22 RX ORDER — SERTRALINE HYDROCHLORIDE 100 MG/1
100 TABLET, FILM COATED ORAL DAILY
Status: DISCONTINUED | OUTPATIENT
Start: 2019-05-23 | End: 2019-05-25 | Stop reason: HOSPADM

## 2019-05-22 RX ORDER — MULTIVITAMIN WITH FOLIC ACID 400 MCG
1 TABLET ORAL DAILY
Status: DISCONTINUED | OUTPATIENT
Start: 2019-05-23 | End: 2019-05-25 | Stop reason: HOSPADM

## 2019-05-22 RX ORDER — NITROGLYCERIN 0.4 MG/1
0.4 TABLET SUBLINGUAL EVERY 5 MIN PRN
Status: DISCONTINUED | OUTPATIENT
Start: 2019-05-22 | End: 2019-05-25 | Stop reason: HOSPADM

## 2019-05-22 RX ORDER — SODIUM CHLORIDE 9 MG/ML
INJECTION, SOLUTION INTRAVENOUS CONTINUOUS
Status: DISCONTINUED | OUTPATIENT
Start: 2019-05-22 | End: 2019-05-23

## 2019-05-22 RX ORDER — ATENOLOL 50 MG/1
50 TABLET ORAL DAILY
Status: DISCONTINUED | OUTPATIENT
Start: 2019-05-23 | End: 2019-05-25 | Stop reason: HOSPADM

## 2019-05-22 RX ORDER — ALPRAZOLAM 0.5 MG/1
0.5 TABLET ORAL
Status: ACTIVE | OUTPATIENT
Start: 2019-05-22 | End: 2019-05-22

## 2019-05-22 RX ORDER — ASPIRIN 81 MG/1
81 TABLET ORAL DAILY
Status: DISCONTINUED | OUTPATIENT
Start: 2019-05-23 | End: 2019-05-24

## 2019-05-22 RX ORDER — ASPIRIN 325 MG
325 TABLET ORAL ONCE
Status: DISCONTINUED | OUTPATIENT
Start: 2019-05-22 | End: 2019-05-25 | Stop reason: HOSPADM

## 2019-05-22 RX ORDER — SODIUM CHLORIDE 0.9 % (FLUSH) 0.9 %
10 SYRINGE (ML) INJECTION EVERY 12 HOURS SCHEDULED
Status: DISCONTINUED | OUTPATIENT
Start: 2019-05-22 | End: 2019-05-24 | Stop reason: SDUPTHER

## 2019-05-22 RX ADMIN — INSULIN GLARGINE 36 UNITS: 100 INJECTION, SOLUTION SUBCUTANEOUS at 22:24

## 2019-05-22 RX ADMIN — Medication 10 ML: at 20:25

## 2019-05-22 RX ADMIN — ACETYLCYSTEINE 500 MG: 500 TABLET, EFFERVESCENT ORAL at 19:28

## 2019-05-22 RX ADMIN — DIPHENHYDRAMINE HCL 50 MG: 25 TABLET ORAL at 19:33

## 2019-05-22 RX ADMIN — HYDROCORTISONE SODIUM SUCCINATE 200 MG: 100 INJECTION, POWDER, FOR SOLUTION INTRAMUSCULAR; INTRAVENOUS at 22:20

## 2019-05-22 RX ADMIN — BUMETANIDE 2 MG: 1 TABLET ORAL at 19:28

## 2019-05-22 RX ADMIN — SODIUM CHLORIDE: 9 INJECTION, SOLUTION INTRAVENOUS at 22:19

## 2019-05-22 RX ADMIN — ATORVASTATIN CALCIUM 80 MG: 80 TABLET, FILM COATED ORAL at 19:28

## 2019-05-22 ASSESSMENT — PAIN DESCRIPTION - ORIENTATION: ORIENTATION: RIGHT;LOWER

## 2019-05-22 ASSESSMENT — PAIN DESCRIPTION - PROGRESSION: CLINICAL_PROGRESSION: NOT CHANGED

## 2019-05-22 ASSESSMENT — PAIN SCALES - GENERAL
PAINLEVEL_OUTOF10: 0
PAINLEVEL_OUTOF10: 0

## 2019-05-22 ASSESSMENT — PAIN DESCRIPTION - ONSET: ONSET: SUDDEN

## 2019-05-22 ASSESSMENT — PAIN DESCRIPTION - FREQUENCY: FREQUENCY: INTERMITTENT

## 2019-05-22 ASSESSMENT — PAIN DESCRIPTION - DESCRIPTORS: DESCRIPTORS: SHARP

## 2019-05-22 ASSESSMENT — PAIN DESCRIPTION - LOCATION: LOCATION: BACK

## 2019-05-22 ASSESSMENT — PAIN DESCRIPTION - PAIN TYPE: TYPE: ACUTE PAIN

## 2019-05-22 ASSESSMENT — PAIN - FUNCTIONAL ASSESSMENT: PAIN_FUNCTIONAL_ASSESSMENT: ACTIVITIES ARE NOT PREVENTED

## 2019-05-23 ENCOUNTER — TELEPHONE (OUTPATIENT)
Dept: FAMILY MEDICINE CLINIC | Age: 74
End: 2019-05-23

## 2019-05-23 PROBLEM — I25.10 3-VESSEL CORONARY ARTERY DISEASE: Status: ACTIVE | Noted: 2019-05-23

## 2019-05-23 LAB
ALBUMIN SERPL-MCNC: 3.3 G/DL (ref 3.5–5.1)
ALP BLD-CCNC: 99 U/L (ref 38–126)
ALT SERPL-CCNC: 14 U/L (ref 11–66)
ANION GAP SERPL CALCULATED.3IONS-SCNC: 12 MEQ/L (ref 8–16)
AST SERPL-CCNC: 15 U/L (ref 5–40)
BACTERIA: ABNORMAL
BILIRUB SERPL-MCNC: 0.3 MG/DL (ref 0.3–1.2)
BILIRUBIN URINE: NEGATIVE
BLOOD, URINE: ABNORMAL
BUN BLDV-MCNC: 40 MG/DL (ref 7–22)
CALCIUM SERPL-MCNC: 8.7 MG/DL (ref 8.5–10.5)
CASTS: ABNORMAL /LPF
CASTS: ABNORMAL /LPF
CHARACTER, URINE: CLEAR
CHLORIDE BLD-SCNC: 112 MEQ/L (ref 98–111)
CHOLESTEROL, TOTAL: 117 MG/DL (ref 100–199)
CO2: 17 MEQ/L (ref 23–33)
COLOR: YELLOW
CREAT SERPL-MCNC: 2.3 MG/DL (ref 0.4–1.2)
CRYSTALS: ABNORMAL
EKG ATRIAL RATE: 62 BPM
EKG ATRIAL RATE: 75 BPM
EKG P AXIS: 99 DEGREES
EKG P-R INTERVAL: 372 MS
EKG P-R INTERVAL: 374 MS
EKG Q-T INTERVAL: 426 MS
EKG Q-T INTERVAL: 450 MS
EKG QRS DURATION: 104 MS
EKG QRS DURATION: 106 MS
EKG QTC CALCULATION (BAZETT): 453 MS
EKG QTC CALCULATION (BAZETT): 475 MS
EKG R AXIS: 42 DEGREES
EKG R AXIS: 77 DEGREES
EKG T AXIS: -148 DEGREES
EKG T AXIS: -41 DEGREES
EKG VENTRICULAR RATE: 61 BPM
EKG VENTRICULAR RATE: 75 BPM
EPITHELIAL CELLS, UA: ABNORMAL /HPF
ERYTHROCYTE [DISTWIDTH] IN BLOOD BY AUTOMATED COUNT: 13.5 % (ref 11.5–14.5)
ERYTHROCYTE [DISTWIDTH] IN BLOOD BY AUTOMATED COUNT: 46.5 FL (ref 35–45)
GFR SERPL CREATININE-BSD FRML MDRD: 28 ML/MIN/1.73M2
GLUCOSE BLD-MCNC: 162 MG/DL (ref 70–108)
GLUCOSE BLD-MCNC: 189 MG/DL (ref 70–108)
GLUCOSE BLD-MCNC: 213 MG/DL (ref 70–108)
GLUCOSE BLD-MCNC: 237 MG/DL (ref 70–108)
GLUCOSE BLD-MCNC: 280 MG/DL (ref 70–108)
GLUCOSE, URINE: >= 1000 MG/DL
HCT VFR BLD CALC: 37.4 % (ref 42–52)
HDLC SERPL-MCNC: 33 MG/DL
HEMOGLOBIN: 12.6 GM/DL (ref 14–18)
INR BLD: 1.01 (ref 0.85–1.13)
KETONES, URINE: NEGATIVE
LDL CHOLESTEROL CALCULATED: 66 MG/DL
LEUKOCYTE ESTERASE, URINE: NEGATIVE
MCH RBC QN AUTO: 31.7 PG (ref 26–33)
MCHC RBC AUTO-ENTMCNC: 33.7 GM/DL (ref 32.2–35.5)
MCV RBC AUTO: 94.2 FL (ref 80–94)
MISCELLANEOUS LAB TEST RESULT: ABNORMAL
NITRITE, URINE: NEGATIVE
PH UA: 5.5 (ref 5–9)
PLATELET # BLD: 186 THOU/MM3 (ref 130–400)
PMV BLD AUTO: 10.5 FL (ref 9.4–12.4)
POTASSIUM REFLEX MAGNESIUM: 4.1 MEQ/L (ref 3.5–5.2)
PROTEIN UA: >= 1000 MG/DL
RBC # BLD: 3.97 MILL/MM3 (ref 4.7–6.1)
RBC URINE: ABNORMAL /HPF
RENAL EPITHELIAL, UA: ABNORMAL
SODIUM BLD-SCNC: 141 MEQ/L (ref 135–145)
SPECIFIC GRAVITY UA: 1.02 (ref 1–1.03)
TOTAL PROTEIN: 6.4 G/DL (ref 6.1–8)
TRIGL SERPL-MCNC: 90 MG/DL (ref 0–199)
UROBILINOGEN, URINE: 0.2 EU/DL (ref 0–1)
WBC # BLD: 7.5 THOU/MM3 (ref 4.8–10.8)
WBC UA: ABNORMAL /HPF
YEAST: ABNORMAL

## 2019-05-23 PROCEDURE — 6370000000 HC RX 637 (ALT 250 FOR IP): Performed by: INTERNAL MEDICINE

## 2019-05-23 PROCEDURE — 2709999900 HC NON-CHARGEABLE SUPPLY

## 2019-05-23 PROCEDURE — 93005 ELECTROCARDIOGRAM TRACING: CPT | Performed by: INTERNAL MEDICINE

## 2019-05-23 PROCEDURE — 36415 COLL VENOUS BLD VENIPUNCTURE: CPT

## 2019-05-23 PROCEDURE — 80053 COMPREHEN METABOLIC PANEL: CPT

## 2019-05-23 PROCEDURE — 81001 URINALYSIS AUTO W/SCOPE: CPT

## 2019-05-23 PROCEDURE — 93010 ELECTROCARDIOGRAM REPORT: CPT | Performed by: INTERNAL MEDICINE

## 2019-05-23 PROCEDURE — 51798 US URINE CAPACITY MEASURE: CPT

## 2019-05-23 PROCEDURE — 85027 COMPLETE CBC AUTOMATED: CPT

## 2019-05-23 PROCEDURE — 80061 LIPID PANEL: CPT

## 2019-05-23 PROCEDURE — 82948 REAGENT STRIP/BLOOD GLUCOSE: CPT

## 2019-05-23 PROCEDURE — 1200000003 HC TELEMETRY R&B

## 2019-05-23 PROCEDURE — 85610 PROTHROMBIN TIME: CPT

## 2019-05-23 PROCEDURE — 2580000003 HC RX 258: Performed by: INTERNAL MEDICINE

## 2019-05-23 RX ORDER — ALPRAZOLAM 0.5 MG/1
0.5 TABLET ORAL ONCE
Status: COMPLETED | OUTPATIENT
Start: 2019-05-23 | End: 2019-05-23

## 2019-05-23 RX ADMIN — LOSARTAN POTASSIUM 50 MG: 50 TABLET, FILM COATED ORAL at 10:17

## 2019-05-23 RX ADMIN — ASPIRIN 81 MG: 81 TABLET ORAL at 10:15

## 2019-05-23 RX ADMIN — CLOPIDOGREL BISULFATE 75 MG: 75 TABLET, FILM COATED ORAL at 10:16

## 2019-05-23 RX ADMIN — ATORVASTATIN CALCIUM 80 MG: 80 TABLET, FILM COATED ORAL at 20:06

## 2019-05-23 RX ADMIN — INSULIN GLARGINE 36 UNITS: 100 INJECTION, SOLUTION SUBCUTANEOUS at 20:01

## 2019-05-23 RX ADMIN — ACETYLCYSTEINE 500 MG: 500 TABLET, EFFERVESCENT ORAL at 08:59

## 2019-05-23 RX ADMIN — ATENOLOL 50 MG: 50 TABLET ORAL at 10:16

## 2019-05-23 RX ADMIN — SODIUM CHLORIDE: 9 INJECTION, SOLUTION INTRAVENOUS at 15:19

## 2019-05-23 RX ADMIN — ACETYLCYSTEINE 500 MG: 500 TABLET, EFFERVESCENT ORAL at 19:58

## 2019-05-23 RX ADMIN — HYDRALAZINE HYDROCHLORIDE 50 MG: 50 TABLET, FILM COATED ORAL at 23:23

## 2019-05-23 RX ADMIN — ALPRAZOLAM 0.5 MG: 0.5 TABLET ORAL at 23:24

## 2019-05-23 ASSESSMENT — PAIN - FUNCTIONAL ASSESSMENT: PAIN_FUNCTIONAL_ASSESSMENT: ACTIVITIES ARE NOT PREVENTED

## 2019-05-23 ASSESSMENT — PAIN DESCRIPTION - ORIENTATION: ORIENTATION: RIGHT

## 2019-05-23 ASSESSMENT — PAIN DESCRIPTION - FREQUENCY: FREQUENCY: INTERMITTENT

## 2019-05-23 ASSESSMENT — PAIN SCALES - GENERAL
PAINLEVEL_OUTOF10: 0
PAINLEVEL_OUTOF10: 0
PAINLEVEL_OUTOF10: 6

## 2019-05-23 ASSESSMENT — PAIN DESCRIPTION - PROGRESSION: CLINICAL_PROGRESSION: GRADUALLY WORSENING

## 2019-05-23 ASSESSMENT — PAIN DESCRIPTION - DESCRIPTORS: DESCRIPTORS: DISCOMFORT

## 2019-05-23 ASSESSMENT — PAIN DESCRIPTION - PAIN TYPE: TYPE: ACUTE PAIN

## 2019-05-23 ASSESSMENT — PAIN DESCRIPTION - LOCATION: LOCATION: RIB CAGE

## 2019-05-23 ASSESSMENT — PAIN DESCRIPTION - ONSET: ONSET: ON-GOING

## 2019-05-23 NOTE — H&P
[facility]   Sedation/Analgesia History and Physical    Pt Name: Renato Younger  MRN: [id number]  YOB: 1945  Provider Performing Procedure: Selin Duarte MD  Primary Care Physician: Kavita Torre MD      Pre-Procedure: Chest pain, possible coronary artery disease, abnormal stress test    Consent: I have discussed with the patient and/or the patient representative the indication, alternatives, and the possible risks and/or complications of the planned procedure and the anesthesia methods. The patient and/or representative appear to understand and agree to proceed. Medical History:   has a past medical history of Angina pectoris ([address] Blood circulation, collateral, CAD (coronary artery disease), Cancer ([address] Cancer ([address] Carotid artery disease ([address] Cerebral artery occlusion with cerebral infarction ([address] CHF (congestive heart failure) ([address] CKD (chronic kidney disease), stage III ([address] Detached retina, Diabetes mellitus ([address] Diabetic nephropathy/sclerosis ([address] Diverticula of colon, GERD (gastroesophageal reflux disease), Hearing loss, History of blood transfusion, Hyperlipidemia, Hypertension, Hypothyroidism, Peripheral vascular disease ([address] Pneumonia, Prostate cancer ([address] Retinopathy due to secondary diabetes ([address] Tobacco abuse, Type II or unspecified type diabetes mellitus without mention of complication, not stated as uncontrolled, Vision blurred, Vitreous hemorrhage of right eye ([address] and Wears glasses. .    Surgical History:     has a past surgical history that includes Tonsillectomy (child); Leg Surgery (1999); Appendectomy (1967); Cholecystectomy (1990); hernia repair (1989); retinal laser (December 2013); retinal laser; malignant skin lesion excision (2011); Refractive surgery (Bilateral); vitrectomy (2/13/14); Cardiac surgery (1989, 1999); Cardiac catheterization; other surgical history; vitrectomy (Right, 03/06/14); eye surgery (Feb and March 2014);  Pacemaker insertion (2014); Cataract removal with implant (Bilateral, October 13, right; Oct 27, left eye); Colonoscopy (9/19/2006, 2015); vascular surgery; Endoscopy, colon, diagnostic; pacemaker placement; Prostatectomy (07/27/2016); Coronary artery bypass graft (1989); Coronary artery bypass graft (1999); Coronary artery bypass graft (08/23/2016); Upper gastrointestinal endoscopy (2017); and skin biopsy. Allergies: Allergies as of 05/22/2019 - Review Complete 05/22/2019   Allergen Reaction Noted    Tape Robley Stade tape] Other (See Comments) 08/01/2012       Medications:   Coumadin use last 5 days:  No  Antiplatelet drug therapy use last 5 days:  Yes  Other anticoagulant use last 5 days:  No   sodium chloride flush  10 mL Intravenous 2 times per day    aspirin  325 mg Oral Once    Acetylcysteine  500 mg Oral BID    atenolol  50 mg Oral Daily    aspirin  81 mg Oral Daily    atorvastatin  80 mg Oral Nightly    bumetanide  2 mg Oral BID    [START ON 5/24/2019] calcitRIOL  0.25 mcg Oral Every Other Day    clopidogrel  75 mg Oral Daily    fluticasone  1 spray Nasal Daily    insulin glargine  36 Units Subcutaneous Nightly    losartan  50 mg Oral Daily    multivitamin  1 tablet Oral Daily    potassium chloride  10 mEq Oral BID    sertraline  100 mg Oral Daily     Prior to Admission medications    Medication Sig Start Date End Date Taking?  Authorizing Provider   Cholecalciferol (VITAMIN D3) 5000 units TABS Take 5,000 Units by mouth daily   Yes Historical Provider, MD   clopidogrel (PLAVIX) 75 MG tablet Take 1 tablet by mouth daily 4/20/19  Yes Hayde Gonzalez MD   rivaroxaban (XARELTO) 10 MG TABS tablet Take 0.5 tablets by mouth daily (with breakfast) 4/20/19  Yes Hayde Gonzalez MD   levothyroxine (SYNTHROID) 75 MCG tablet TAKE 1 TABLET PO DAILY 2/14/19  Yes ZHANNA Arboleda - CNP   atenolol (TENORMIN) 50 MG tablet TAKE 1 TABLET DAILY 1/18/19  Yes Chidi Lopez MD   fluticasone (FLONASE) 50 MCG/ACT nasal spray 1 spray by Nasal route daily 1/18/19  Yes Hernán Rocha MD   insulin lispro (HUMALOG KWIKPEN) 100 UNIT/ML pen INJECT PER SLIDING SCALE THREE TIMES A DAY BEFORE MEALS (MAX OF 80 UNITS DAILY) 1/18/19  Yes Hernán Rocha MD   insulin glargine (LANTUS SOLOSTAR) 100 UNIT/ML injection pen Inject SQ 36Units in am, 36U in pm 1/18/19  Yes Hernán Rocha MD   losartan (COZAAR) 50 MG tablet Take 1 tablet by mouth daily 1/18/19  Yes Hernán Rocha MD   sertraline (ZOLOFT) 100 MG tablet TAKE 1 TABLET DAILY 1/18/19  Yes Hernán Rocha MD   VICTOZA 18 MG/3ML SOPN SC injection INJECT 1.8 MG INTO THE SKIN DAILY 12/26/18  Yes ZHANNA Peraza - CNP   atorvastatin (LIPITOR) 80 MG tablet TAKE 1 TABLET DAILY 12/11/18  Yes Jayme Gonzalez MD   potassium chloride (KLOR-CON M) 10 MEQ extended release tablet Take 1 tablet by mouth 2 times daily 5/27/18  Yes Yin Ferreira MD   hydrALAZINE (APRESOLINE) 50 MG tablet Take 50 mg by mouth 3 times daily as needed Take for systolic BP greater than 848   Yes Historical Provider, MD   nitroGLYCERIN (NITROLINGUAL) 0.4 MG/SPRAY 0.4 mg spray Place 1 spray under the tongue as needed 12/28/17  Yes Historical Provider, MD   aspirin 81 MG EC tablet Take 81 mg by mouth daily   Yes Historical Provider, MD   calcitRIOL (ROCALTROL) 0.25 MCG capsule Take 0.25 mcg by mouth every other day    Yes Historical Provider, MD   bumetanide (BUMEX) 2 MG tablet Take 1 tablet by mouth 2 times daily  5/1/17  Yes Haritha Cristina MD   albuterol sulfate HFA (PROVENTIL HFA) 108 (90 BASE) MCG/ACT inhaler Inhale 2 puffs into the lungs every 6 hours as needed for Wheezing 8/22/16  Yes GENEVIEVE Shankar-C   Multiple Vitamin (MULTI-VITAMIN) TABS   Take 1 tablet by mouth daily    Yes Historical Provider, MD   CPAP Machine MISC by Does not apply route Please change APAP pressure to 12 to 20 cm H20. 7/17/18   Vinayak Alvarado MD   glucose blood VI test strips (ACCU-CHEK TERESA PLUS) strip Check BS 4 times a day 4/19/16 Diamond Troy MD   B-D ULTRAFINE III SHORT PEN 31G X 8 MM MISC USE TO INJECT INSULIN UNDER THE SKIN FOUR TIMES A DAY 8/20/15   ZHANNA Rico - CNS   glucagon 1 MG injection Inject 1 mg into the skin See Admin Instructions. Follow package directions for low blood sugar. 10/11/12   Diamond Troy MD       Vital Signs  Vitals:    05/23/19 1643   BP: (!) 147/75   Pulse: 63   Resp: 16   Temp: 98.2 °F (36.8 °C)   SpO2: 94%       Physical:  Heart:  regular rate and rhythm  Lungs:  Clear  Abdomen:  Soft  Mental Status:  Alert and Oriented    Planned Procedure:  Left Heart Cath and Possible Percutaneous Coronary Intervention    Sedation/ Anesthesia Plan: Midazolam and Sublimaze    ASA Classification: Class 3 - A patient with severe systemic disease that limits activity but is not incapacitating    Mallampati Airway Classification: II (soft palate, uvula, fauces visible)    · Pre-procedure diagnostic studies complete and results available. · Previous sedation/anesthesia experiences assessed. · The patient is an appropriate candidate to undergo the planned procedure sedation and anesthesia. (Refer to nursing sedation/analgesia documentation record)  · Formulation and discussion of sedation/procedure plan, risks, and expectations with patient and/or responsible adult completed. · Patient examined immediately prior to the procedure.  (Refer to nursing sedation/analgesia documentation record)    Elisa Cardenas MD  Electronically signed 5/23/2019 at 6:21 PM  Patient Name: Deondre Sexton   Medical Record Number: [id number]  Date: 5/23/2019   Time: 6:21 PM   Room/Bed: [address] [place] [address]-

## 2019-05-23 NOTE — CARE COORDINATION
[address] Transitions Interview     2019    Patient: Gaby Oseguera Patient : [id number]   MRN: [id number]  Reason for Admission: CAD in native artery [I25.10]  3-vessel coronary artery disease [I25.10]  RARS: Readmission Risk Score: 19    Met with: Melchor Giles for inpatient Care Transition interview. Identified self/role. Explained CTC process, verbalized understanding and agreeable to CTC calls. Readmission Risk  Patient Active Problem List   Diagnosis    Hyperlipidemia    Hypertension    Peripheral vascular disease ([address]    CAD (coronary artery disease)    Hypothyroid    S/P CABG x 4    T2DM (type 2 diabetes mellitus) ([address]    Incisional hernia    CKD (chronic kidney disease), stage III (HCC)    Tachycardia-bradycardia syndrome ([address]    Dyspnea    CAP (community acquired pneumonia)    Diabetes mellitus, type II ([address]    CKD (chronic kidney disease)    Hypothyroidism    SOB (shortness of breath)    Acute diastolic heart failure (HCC)    Valvular heart disease    Pulmonary hypertension (HCC)    HTN (hypertension)    Chronic diarrhea    Hx of adenomatous colonic polyps    Diverticulosis    Microscopic colitis    GERD (gastroesophageal reflux disease)    H/O class III angina pectoris    Obesity due to excess calories    Prostate cancer ([address]    S/P prostatectomy    Chest pain    Urinary retention    MI, acute, non ST segment elevation (HCC)    Type 2 diabetes mellitus with hyperglycemia (HCC)    Unstable angina (HCC)    GAURI on CPAP    COPD (chronic obstructive pulmonary disease) (HCC)    Renal failure    Family history of class III angina pectoris    Chronic combined systolic and diastolic congestive heart failure (HCC)    Abnormal nuclear stress test    Obesity (BMI 30.0-34. 9)    CAD in native artery    Congestive heart failure of unknown etiology ([address]    Peripheral edema    Chronic cough    Nodule of right lung    Tobacco abuse    3-vessel coronary artery disease       Inpatient Assessment  Care Transitions Summary    Care Transitions Inpatient Review  Medication Review  Do you have all of your prescriptions and are they filled?:  No   Barriers to Medication Adherence:  None  Are you able to afford your medications?:  Yes  How often do you have difficulty taking your medications?:  I always take them as prescribed. Housing Review  Who do you live with?:  Partner/Spouse/SO  Are you an active caregiver in your home?:  Yes  For whom are you the caregiver?:  wife  Does the person that you care for see a [facility] PCP?:  Yes  Who is the PCP of the person that you care for?:  Dr. Abbie Titus you have a ?:  No  Do you have a [facility]?:  No  Durable Medical Equipment  Patient Home Equipment:  CPAP, Other (Comment: Lifevest)  Functional Review  Ability to seek help/take action for Emergent/Urgent situations i.e. fire, crime, inclement weather or health crisis. :  Independent  Ability handle personal hygiene needs (bathing/dressing/grooming): Independent  Ability to manage medications: Independent  Ability to prepare food:  Independent  Ability to maintain home (clean home, laundry): Independent  Ability to drive and/or has transportation:  Independent  Ability to do shopping:  Independent  Ability to manage finances: Independent  Is patient able to live independently?:  Yes  Hearing and Vision  Visual Impairment:  Visual impairment (Glasses/contacts)  Hearing Impairment:  Wears hearing aids, Hard of hearing  Care Transitions Interventions  No Identified Needs       Patient admitted for on 05/22/2019 for pre-hydration and cardiac catheterization with Dr. Hannah Brantley. PMH includes CAD, CHF, CKD, DM, HLD, and HTN. Patient admitted for CAD in native artery [I25.10] and 3-vessel coronary artery disease [I25.10]. Consult with Nephrology. Case management following. Patient resides at home with his wife.  Patient drives and is independent with ADL's. Patient denies assistive device use prior to admission. Patient has a scale, blood pressure cuff, and glucometer for chronic disease management. Patient is currently wearing a Lifevest from 04/17/2019 admission. Patient questioning process for defibrillator. Educated on reasoning for [address] in regards to insurance and potential defibrillator placement, patient verbalized understanding. Patient denies additional needs or concerns at this time. Business card with contact information for CTC provided. Patient understands CTC will follow upon discharge. Patient previously followed by [address] [place] [address] for diabetes and COPD, graduated on 03/26/2019. Will monitor for potential enrollment, [facility] notified. Follow Up  Future Appointments   Date Time Provider Dean Simpson   5/24/2019  3:00 PM [facility] CATH HYBRID OR [facility] CATH LB None   6/4/2019  8:30 AM Sha Bond MD Pul Med [facility]   7/12/2019  9:45 AM ZHANNA Roca - CNP [facility] Urology [facility] Maintenance  There are no preventive care reminders to display for this patient.     Bulmaro Covarrubias, RN  Care Transition Coordinator  (T) [phone] (e) [phone]

## 2019-05-23 NOTE — PLAN OF CARE
Problem: Pain:  Goal: Pain level will decrease  Description  Pain level will decrease  Outcome: Met This Shift  Note:   Pt denies CP, SOB  Goal: Control of acute pain  Description  Control of acute pain  Outcome: Met This Shift  Goal: Control of chronic pain  Description  Control of chronic pain  Outcome: Met This Shift     Problem: Activity:  Goal: Risk for activity intolerance will decrease  Description  Risk for activity intolerance will decrease  Outcome: Met This Shift  Note:   Pt at baseline functioning. Goal: Will verbalize the importance of balancing activity with adequate rest periods  Description  Will verbalize the importance of balancing activity with adequate rest periods  Outcome: Met This Shift     Problem: Cardiac:  Goal: Diagnostic test results will improve  Description  Diagnostic test results will improve  Outcome: Met This Shift  Note:   Pt's cardiac markers are normal.      Problem: Coping:  Goal: Ability to verbalize feelings about condition will improve  Description  Ability to verbalize feelings about condition will improve  Outcome: Met This Shift  Note:   Pt states confidence in Dr Jose Carlos Campos to be able to open his arteries and reestablish blood flow.      Problem: Health Behavior:  Goal: Ability to identify changes in lifestyle to reduce recurrence of condition will improve  Description  Ability to identify changes in lifestyle to reduce recurrence of condition will improve  Outcome: Met This Shift  Goal: Ability to manage health-related needs will improve  Description  Ability to manage health-related needs will improve  Outcome: Met This Shift     Problem: Nutritional:  Goal: Ability to identify appropriate dietary choices will improve  Description  Ability to identify appropriate dietary choices will improve  Outcome: Met This Shift     Problem: Respiratory:  Goal: Ability to maintain adequate ventilation will improve  Description  Ability to maintain adequate ventilation will

## 2019-05-24 ENCOUNTER — APPOINTMENT (OUTPATIENT)
Dept: ULTRASOUND IMAGING | Age: 74
DRG: 247 | End: 2019-05-24
Attending: INTERNAL MEDICINE
Payer: MEDICARE

## 2019-05-24 ENCOUNTER — APPOINTMENT (OUTPATIENT)
Dept: CARDIAC CATH/INVASIVE PROCEDURES | Age: 74
DRG: 247 | End: 2019-05-24
Attending: INTERNAL MEDICINE
Payer: MEDICARE

## 2019-05-24 LAB
ALBUMIN SERPL-MCNC: 3.1 G/DL (ref 3.5–5.1)
ANION GAP SERPL CALCULATED.3IONS-SCNC: 16 MEQ/L (ref 8–16)
BUN BLDV-MCNC: 39 MG/DL (ref 7–22)
CALCIUM SERPL-MCNC: 8.3 MG/DL (ref 8.5–10.5)
CHLORIDE BLD-SCNC: 109 MEQ/L (ref 98–111)
CO2: 14 MEQ/L (ref 23–33)
CREAT SERPL-MCNC: 2.3 MG/DL (ref 0.4–1.2)
ERYTHROCYTE [DISTWIDTH] IN BLOOD BY AUTOMATED COUNT: 13.8 % (ref 11.5–14.5)
ERYTHROCYTE [DISTWIDTH] IN BLOOD BY AUTOMATED COUNT: 48.5 FL (ref 35–45)
GFR SERPL CREATININE-BSD FRML MDRD: 28 ML/MIN/1.73M2
GLUCOSE BLD-MCNC: 237 MG/DL (ref 70–108)
HCT VFR BLD CALC: 37.6 % (ref 42–52)
HEMOGLOBIN: 12.6 GM/DL (ref 14–18)
MCH RBC QN AUTO: 32.6 PG (ref 26–33)
MCHC RBC AUTO-ENTMCNC: 33.5 GM/DL (ref 32.2–35.5)
MCV RBC AUTO: 97.2 FL (ref 80–94)
PHOSPHORUS: 2.8 MG/DL (ref 2.4–4.7)
PLATELET # BLD: 179 THOU/MM3 (ref 130–400)
PMV BLD AUTO: 11.1 FL (ref 9.4–12.4)
POTASSIUM REFLEX MAGNESIUM: 3.9 MEQ/L (ref 3.5–5.2)
POTASSIUM SERPL-SCNC: 3.9 MEQ/L (ref 3.5–5.2)
PRO-BNP: 9716 PG/ML (ref 0–900)
RBC # BLD: 3.87 MILL/MM3 (ref 4.7–6.1)
SODIUM BLD-SCNC: 139 MEQ/L (ref 135–145)
WBC # BLD: 10.1 THOU/MM3 (ref 4.8–10.8)

## 2019-05-24 PROCEDURE — 92928 PRQ TCAT PLMT NTRAC ST 1 LES: CPT | Performed by: INTERNAL MEDICINE

## 2019-05-24 PROCEDURE — C1760 CLOSURE DEV, VASC: HCPCS

## 2019-05-24 PROCEDURE — 6370000000 HC RX 637 (ALT 250 FOR IP): Performed by: INTERNAL MEDICINE

## 2019-05-24 PROCEDURE — 2709999900 HC NON-CHARGEABLE SUPPLY

## 2019-05-24 PROCEDURE — C1769 GUIDE WIRE: HCPCS

## 2019-05-24 PROCEDURE — C1894 INTRO/SHEATH, NON-LASER: HCPCS

## 2019-05-24 PROCEDURE — 6370000000 HC RX 637 (ALT 250 FOR IP)

## 2019-05-24 PROCEDURE — 6360000002 HC RX W HCPCS

## 2019-05-24 PROCEDURE — 2500000003 HC RX 250 WO HCPCS

## 2019-05-24 PROCEDURE — 85027 COMPLETE CBC AUTOMATED: CPT

## 2019-05-24 PROCEDURE — 80069 RENAL FUNCTION PANEL: CPT

## 2019-05-24 PROCEDURE — 6360000002 HC RX W HCPCS: Performed by: INTERNAL MEDICINE

## 2019-05-24 PROCEDURE — 83880 ASSAY OF NATRIURETIC PEPTIDE: CPT

## 2019-05-24 PROCEDURE — 76770 US EXAM ABDO BACK WALL COMP: CPT

## 2019-05-24 PROCEDURE — C1725 CATH, TRANSLUMIN NON-LASER: HCPCS

## 2019-05-24 PROCEDURE — 94640 AIRWAY INHALATION TREATMENT: CPT

## 2019-05-24 PROCEDURE — 36415 COLL VENOUS BLD VENIPUNCTURE: CPT

## 2019-05-24 PROCEDURE — 6360000004 HC RX CONTRAST MEDICATION: Performed by: INTERNAL MEDICINE

## 2019-05-24 PROCEDURE — 2500000003 HC RX 250 WO HCPCS: Performed by: INTERNAL MEDICINE

## 2019-05-24 PROCEDURE — C1887 CATHETER, GUIDING: HCPCS

## 2019-05-24 PROCEDURE — 80048 BASIC METABOLIC PNL TOTAL CA: CPT

## 2019-05-24 PROCEDURE — 93005 ELECTROCARDIOGRAM TRACING: CPT | Performed by: INTERNAL MEDICINE

## 2019-05-24 PROCEDURE — C1874 STENT, COATED/COV W/DEL SYS: HCPCS

## 2019-05-24 PROCEDURE — 2580000003 HC RX 258: Performed by: INTERNAL MEDICINE

## 2019-05-24 PROCEDURE — 1200000003 HC TELEMETRY R&B

## 2019-05-24 RX ORDER — ATROPINE SULFATE 0.4 MG/ML
0.5 AMPUL (ML) INJECTION
Status: ACTIVE | OUTPATIENT
Start: 2019-05-24 | End: 2019-05-24

## 2019-05-24 RX ORDER — SODIUM CHLORIDE 9 MG/ML
INJECTION, SOLUTION INTRAVENOUS CONTINUOUS
Status: ACTIVE | OUTPATIENT
Start: 2019-05-24 | End: 2019-05-25

## 2019-05-24 RX ORDER — SODIUM CHLORIDE 0.9 % (FLUSH) 0.9 %
10 SYRINGE (ML) INJECTION PRN
Status: DISCONTINUED | OUTPATIENT
Start: 2019-05-24 | End: 2019-05-25 | Stop reason: HOSPADM

## 2019-05-24 RX ORDER — NICOTINE POLACRILEX 4 MG
15 LOZENGE BUCCAL PRN
Status: DISCONTINUED | OUTPATIENT
Start: 2019-05-24 | End: 2019-05-25 | Stop reason: HOSPADM

## 2019-05-24 RX ORDER — FUROSEMIDE 10 MG/ML
20 INJECTION INTRAMUSCULAR; INTRAVENOUS ONCE
Status: COMPLETED | OUTPATIENT
Start: 2019-05-24 | End: 2019-05-24

## 2019-05-24 RX ORDER — ONDANSETRON 2 MG/ML
4 INJECTION INTRAMUSCULAR; INTRAVENOUS EVERY 6 HOURS PRN
Status: DISCONTINUED | OUTPATIENT
Start: 2019-05-24 | End: 2019-05-25 | Stop reason: HOSPADM

## 2019-05-24 RX ORDER — ASPIRIN 325 MG
325 TABLET ORAL DAILY
Status: DISCONTINUED | OUTPATIENT
Start: 2019-05-25 | End: 2019-05-25 | Stop reason: HOSPADM

## 2019-05-24 RX ORDER — DEXTROSE MONOHYDRATE 50 MG/ML
100 INJECTION, SOLUTION INTRAVENOUS PRN
Status: DISCONTINUED | OUTPATIENT
Start: 2019-05-24 | End: 2019-05-25 | Stop reason: HOSPADM

## 2019-05-24 RX ORDER — ACETAMINOPHEN 325 MG/1
650 TABLET ORAL EVERY 4 HOURS PRN
Status: DISCONTINUED | OUTPATIENT
Start: 2019-05-24 | End: 2019-05-25 | Stop reason: HOSPADM

## 2019-05-24 RX ORDER — SODIUM CHLORIDE 0.9 % (FLUSH) 0.9 %
10 SYRINGE (ML) INJECTION EVERY 12 HOURS SCHEDULED
Status: DISCONTINUED | OUTPATIENT
Start: 2019-05-24 | End: 2019-05-25 | Stop reason: HOSPADM

## 2019-05-24 RX ORDER — DEXTROSE MONOHYDRATE 25 G/50ML
12.5 INJECTION, SOLUTION INTRAVENOUS PRN
Status: DISCONTINUED | OUTPATIENT
Start: 2019-05-24 | End: 2019-05-25 | Stop reason: HOSPADM

## 2019-05-24 RX ADMIN — FUROSEMIDE 20 MG: 10 INJECTION, SOLUTION INTRAMUSCULAR; INTRAVENOUS at 12:43

## 2019-05-24 RX ADMIN — ONDANSETRON 4 MG: 2 INJECTION INTRAMUSCULAR; INTRAVENOUS at 23:17

## 2019-05-24 RX ADMIN — ASPIRIN 81 MG: 81 TABLET ORAL at 07:54

## 2019-05-24 RX ADMIN — ACETYLCYSTEINE 500 MG: 500 TABLET, EFFERVESCENT ORAL at 07:50

## 2019-05-24 RX ADMIN — Medication 1 TABLET: at 07:54

## 2019-05-24 RX ADMIN — ATORVASTATIN CALCIUM 80 MG: 80 TABLET, FILM COATED ORAL at 21:40

## 2019-05-24 RX ADMIN — INSULIN GLARGINE 36 UNITS: 100 INJECTION, SOLUTION SUBCUTANEOUS at 21:42

## 2019-05-24 RX ADMIN — CALCITRIOL 0.25 MCG: 0.25 CAPSULE, LIQUID FILLED ORAL at 07:54

## 2019-05-24 RX ADMIN — SODIUM BICARBONATE: 84 INJECTION, SOLUTION INTRAVENOUS at 00:02

## 2019-05-24 RX ADMIN — ATENOLOL 50 MG: 50 TABLET ORAL at 07:50

## 2019-05-24 RX ADMIN — IOPAMIDOL 230 ML: 755 INJECTION, SOLUTION INTRAVENOUS at 20:12

## 2019-05-24 RX ADMIN — Medication 2 PUFF: at 00:10

## 2019-05-24 RX ADMIN — ACETYLCYSTEINE 500 MG: 500 TABLET, EFFERVESCENT ORAL at 23:03

## 2019-05-24 RX ADMIN — FLUTICASONE PROPIONATE 1 SPRAY: 50 SPRAY, METERED NASAL at 07:56

## 2019-05-24 RX ADMIN — SERTRALINE HYDROCHLORIDE 100 MG: 100 TABLET, FILM COATED ORAL at 07:50

## 2019-05-24 RX ADMIN — HYDRALAZINE HYDROCHLORIDE 50 MG: 50 TABLET, FILM COATED ORAL at 21:48

## 2019-05-24 RX ADMIN — HYDRALAZINE HYDROCHLORIDE 50 MG: 50 TABLET, FILM COATED ORAL at 16:17

## 2019-05-24 RX ADMIN — POTASSIUM CHLORIDE 10 MEQ: 20 TABLET, EXTENDED RELEASE ORAL at 07:54

## 2019-05-24 RX ADMIN — CLOPIDOGREL BISULFATE 75 MG: 75 TABLET, FILM COATED ORAL at 07:54

## 2019-05-24 ASSESSMENT — PAIN DESCRIPTION - PROGRESSION
CLINICAL_PROGRESSION: GRADUALLY WORSENING
CLINICAL_PROGRESSION: NOT CHANGED
CLINICAL_PROGRESSION: GRADUALLY WORSENING

## 2019-05-24 ASSESSMENT — PAIN DESCRIPTION - ONSET: ONSET: ON-GOING

## 2019-05-24 ASSESSMENT — PAIN SCALES - GENERAL: PAINLEVEL_OUTOF10: 5

## 2019-05-24 ASSESSMENT — PAIN - FUNCTIONAL ASSESSMENT: PAIN_FUNCTIONAL_ASSESSMENT: ACTIVITIES ARE NOT PREVENTED

## 2019-05-24 ASSESSMENT — PAIN DESCRIPTION - DESCRIPTORS: DESCRIPTORS: ACHING

## 2019-05-24 ASSESSMENT — PAIN DESCRIPTION - LOCATION: LOCATION: BACK

## 2019-05-24 ASSESSMENT — PAIN DESCRIPTION - FREQUENCY: FREQUENCY: INTERMITTENT

## 2019-05-24 ASSESSMENT — PAIN DESCRIPTION - ORIENTATION: ORIENTATION: MID

## 2019-05-24 ASSESSMENT — PAIN DESCRIPTION - PAIN TYPE: TYPE: ACUTE PAIN

## 2019-05-24 NOTE — FLOWSHEET NOTE
05/23/19 2301   Provider Notification   Reason for Communication Review case   Provider Name Chidi Hammond   Provider Notification Physician   Method of Communication Call   Response See orders   Notification Time [phone] per patient request for something to sleep. Xanax 0.5mg ordered.

## 2019-05-24 NOTE — PROGRESS NOTES
Renal Progress Note    Pt Name: Vania Lubin  MRN: [id number]  [id number]  YOB: 1945  Admit Date: 5/22/2019  5:26 PM  Date of evaluation: 5/24/2019  Primary Care Physician: Jasmina Lyons MD   [id number]       Subjective: Interval History:  Patient was not SOB last night, but this afternoon started to have some shortness of breath upon lying down. No chest pain reported. Diet: DIET CARDIAC;    Medications:   Scheduled Meds:   sodium chloride flush  10 mL Intravenous 2 times per day    aspirin  325 mg Oral Once    Acetylcysteine  500 mg Oral BID    atenolol  50 mg Oral Daily    aspirin  81 mg Oral Daily    atorvastatin  80 mg Oral Nightly    bumetanide  2 mg Oral BID    calcitRIOL  0.25 mcg Oral Every Other Day    clopidogrel  75 mg Oral Daily    fluticasone  1 spray Nasal Daily    insulin glargine  36 Units Subcutaneous Nightly    multivitamin  1 tablet Oral Daily    potassium chloride  10 mEq Oral BID    sertraline  100 mg Oral Daily     Continuous Infusions:   sodium bicarbonate infusion Stopped (05/24/19 1244)       Objective:   Vitals:   BP (!) 173/93   Pulse 70   Temp 98.5 °F (36.9 °C) (Oral)   Resp 16   Ht 6' (1.829 m)   Wt 279 lb 12.8 oz (126.9 kg)   SpO2 94%   BMI 37.95 kg/m²     I&O's:    Intake/Output Summary (Last 24 hours) at 5/24/2019 1307  Last data filed at 5/24/2019 0803  Gross per 24 hour   Intake 2594.89 ml   Output 750 ml   Net 1844.89 ml     I/O last 3 completed shifts: In: 2594.9 [P.O.:420; I.V.:2174.9]  Out: 500 [Urine:500]   Date 05/24/19 0000 - 05/24/19 2359   Shift 8207-1745 0779-1999 7941-2359 24 Hour Total   INTAKE   P.O.(mL/kg/hr) 0(0)   0   I. V.(mL/kg) 978.8(7.7)   978.8(7.7)   Shift Total(mL/kg) 978.8(7.7)   978.8(7.7)   OUTPUT   Urine(mL/kg/hr) 250(0.2) 250  500   Shift Total(mL/kg) 250(2) 250(2)  500(3.9)   Weight (kg) 126.9 126.9 126.9 126.9       General appearance: some dyspnea noted  HEENT: PERRLA, EOMI, NON ICTERIC  Neck: NO LAD, NO THYROMEGALY  Lungs: CLEAR TO AUSCULTATION BILATERALLY  Heart: S1 S2 NORMAL, REGULAR RATE AND RHYTHM, NO AUDIBLE MURMURS  Abdomen: SOFT, NON TENDER, NON DISTENDED, NO ORGANOMEGALY FELT, BOWEL SOUNDS NORMAL  Extremities: NO EDEMA  Neurologic: GROSSLY NON FOCAL  Skin: NO RASHES  Hematology: NO BLEEDING, NO BRUISING  Genitourinary: no lesions    LABS:    CBC:   Recent Labs     05/22/19 1830 05/23/19 0333 05/24/19  0612   WBC 6.9 7.5 10.1   HGB 12.9* 12.6* 12.6*    186 179     BMP:  Recent Labs     05/22/19 1830 05/23/19 0333 05/24/19  0612    141 139   K 3.9 4.1 3.9  3.9    112* 109   CO2 18* 17* 14*   BUN 41* 40* 39*   CREATININE 2.5* 2.3* 2.3*   GLUCOSE 267* 162* 237*   CALCIUM 8.8 8.7 8.3*   PHOS  --   --  2.8     TSH: No results for input(s): TSH in the last 72 hours. HgBa1c: No results for input(s): LABA1C in the last 72 hours. Hepatic:   Recent Labs     05/23/19 0333 05/24/19 0612   LABALBU 3.3* 3.1*   AST 15  --    ALT 14  --    BILITOT 0.3  --    ALKPHOS 99  --      Troponin: No results for input(s): TROPONINI in the last 72 hours. BNP: No results for input(s): BNP in the last 72 hours. Lipids:   Recent Labs     05/23/19 0333   CHOL 117   HDL 33     INR:   Recent Labs     05/23/19 0333   INR 1.01       Images and Other:  reviewed      Assessment and Plan:   1, CAD  2, Angina  3, Hypertension  5, CKD stage 4 - baseline serum creatinine of 2.2-2.5 mg/dL  6, Diabetes  7, Acute on Chronic Congestive heart failure.     Plan  - hold IV fluids  - Lasix 20 mg IV x1 now  - check renal US  - continue hold Losartan  - s/p mucomyst   - check bladder scan and record PVRS     I had extensive discussion with the patient and explained to him the risk of worsening renal function even to the point of needing dialysis if exposed to IV contrast during cardiac catherization. Patient said that he has thought a lot about it and knows the risk and agreed to proceed with cardiac cath.   Patient told me he has not decided about dialysis in case his renal function deteriorates.   Recommend minimizing dose of contrast and use of Visipaque if feasible.     Thank you  for allowing us to participate in care of Deondre Darshan           Electronically signed by Stevie Ariza MD on 5/24/2019 at 1:07 PM

## 2019-05-24 NOTE — CONSULTS
Nephrology Consult Note  [facility]    PatPt Name: Anderson Ortiz  MRN: [id number]  [id number]  YOB: 1945  Admit Date: 5/22/2019  5:26 PM  Date of evaluation: 5/23/2019  Primary Care Physician: Zane Hope MD   [id number]     Nephrologist:  ARABELLA Castle    Reason for Consult:  Chronic kidney disease stage 3, CAD  Requesting Physician: Dr Janice Echols MD    Chief Complaint:  Chest pain  History Obtained From:  patient    History of Present Ilness:    Mr Thomas Ruth is a pleasant male with significant past medical history of CAD, CKD stage 4, Hypertension, Diabetes, CHF who presented to the hospital for hydration prior to cardiac catherization for angina and abnormal stress test.  Patient told me that he has had CKD stage 4 since a few years and has seen Dr Ruth Piedra outpatient. Patient has cardiac cath done few weeks and presented this time stages intervention. He denies any shortness of breath, chest pain, nausea, vomiting, diarrhea. No urinary symptoms reported.     PMHx:   Past Medical History:   Diagnosis Date    Angina pectoris ([address]     Blood circulation, collateral     CAD (coronary artery disease) 1989    Status post bypass, Status post stents    Cancer ([address] 4602,2116    Melanoma x2     Cancer ([address] 05/2016    Prostate     Carotid artery disease ([address] 2008    moderate    Cerebral artery occlusion with cerebral infarction ([address]     CHF (congestive heart failure) ([address] 1985    CKD (chronic kidney disease), stage III ([address] 2012    has lost 13% more of kidney function 2016    Detached retina     Diabetes mellitus ([address]     Diabetic nephropathy/sclerosis ([address] 2012    Diverticula of colon 2006    GERD (gastroesophageal reflux disease)     Hearing loss     does not wear aides    History of blood transfusion     during open heart ? and During service    Hyperlipidemia 1989    Hypertension 1999    Hypothyroidism 2012     Heart Surgery Brother 64    Coronary Art Dis Brother 64        MI    Heart Disease Brother 64    Heart Disease Maternal Aunt     Coronary Art Dis Maternal Aunt     Heart Attack Maternal Aunt     Heart Disease Maternal Uncle     Coronary Art Dis Maternal Uncle     Heart Attack Maternal Uncle     Cancer Paternal Aunt         unknown     Cancer Paternal Uncle         unknown        Social History:  reports that he quit smoking about 17 years ago. His smoking use included cigarettes. He has a 60.00 pack-year smoking history. He has never used smokeless tobacco. He reports that he drinks about 0.6 oz of alcohol per week. He reports that he does not use drugs.     Allergies:  Tape [adhesive tape]    Current Medications:      sodium bicarbonate 75 mEq in sodium chloride 0.45 % 1,000 mL infusion Continuous   sodium chloride flush 0.9 % injection 10 mL 2 times per day   sodium chloride flush 0.9 % injection 10 mL PRN   nitroGLYCERIN (NITROSTAT) SL tablet 0.4 mg Q5 Min PRN   aspirin tablet 325 mg Once   Acetylcysteine TBEF 500 mg BID   atenolol (TENORMIN) tablet 50 mg Daily   aspirin EC tablet 81 mg Daily   atorvastatin (LIPITOR) tablet 80 mg Nightly   bumetanide (BUMEX) tablet 2 mg BID   [START ON 5/24/2019] calcitRIOL (ROCALTROL) capsule 0.25 mcg Every Other Day   clopidogrel (PLAVIX) tablet 75 mg Daily   fluticasone (FLONASE) 50 MCG/ACT nasal spray 1 spray Daily   hydrALAZINE (APRESOLINE) tablet 50 mg TID PRN   insulin glargine (LANTUS) injection vial 36 Units Nightly   multivitamin 1 tablet Daily   potassium chloride (KLOR-CON M) extended release tablet 10 mEq BID   sertraline (ZOLOFT) tablet 100 mg Daily   albuterol sulfate  (90 Base) MCG/ACT inhaler 2 puff Q6H PRN       Review of Systems:   Constitutional: no distress  HEENT:  No eyes, nose or ear dianage  Neck: no sore throat  Lungs: no shortness of breath  Heart: no chest pain  Abdomen: no abdominal pain, nausea, vomiting diarrhea  Extremities: no edema  Neurologic: weakness noted  Skin : no rashes  Hematology : no bruising or bleeding  Genitourinary : no urinary symptoms reported    Physical exam:  Vitals: BP (!) 169/82   Pulse 65   Temp 98.1 °F (36.7 °C) (Oral)   Resp 16   SpO2 96%     I & O's:    Intake/Output Summary (Last 24 hours) at 5/23/2019 2246  Last data filed at 5/23/2019 1935  Gross per 24 hour   Intake 2795.3 ml   Output --   Net 2795.3 ml     I/O last 3 completed shifts: In: 1379.2 [P.O.:500; I.V.:879.2]  Out: -    Date 05/23/19 0000 - 05/23/19 2359   Shift 2985-9124 5211-2022 5182-4153 24 Hour Total   INTAKE   P.O. 300 0 420 720   I.V. 879.2 0 1196.1 2075. 3   Shift Total 1179.2 0 1616.1 2795.3   OUTPUT   Shift Total       Weight (kg)           General appearance: no distress  HEENT: PERRLA, EOMI, NON ICTERIC  Neck: NO LAD, NO THYROMEGALY  Lungs: CLEAR TO AUSCULTATION BILATERALLY  Heart: S1 S2 NORMAL, REGULAR RATE AND RHYTHM, NO AUDIBLE MURMURS  Abdomen: SOFT, NON TENDER, NON DISTENDED, NO ORGANOMEGALY FELT, BOWEL SOUNDS NORMAL  Extremities: NO EDEMA  Neurologic: GROSSLY NON FOCAL  Skin : NO RASHES   Hematology : NO BLEEDING, NO BRUSISING  Genitourinary: no lesions      Data:    CBC: Recent Labs     05/22/19 1830 05/23/19 0333   WBC 6.9 7.5   RBC 4.03* 3.97*   HGB 12.9* 12.6*   HCT 38.1* 37.4*   MCV 94.5* 94.2*    186     BMP: Recent Labs     05/22/19  1830 05/23/19  0333    141   K 3.9 4.1    112*   CO2 18* 17*   BUN 41* 40*   CREATININE 2.5* 2.3*     Hepatic: Recent Labs     05/23/19 0333   ALKPHOS 99   ALT 14   AST 15   PROT 6.4   BILITOT 0.3   LABALBU 3.3*     PT/INR:   Recent Labs     05/23/19 0333   INR 1.01     APTT:No results for input(s): APTT in the last 72 hours.   ABGs: No results found for: PHART, PO2ART, NTL4UMO, CYN9FDB, BEART, U9UFRMVU   Lactic Acid:   Lab Results   Component Value Date    LACTA 1.2 05/23/2018      Amylase: No results found for: AMYLASE   Lipase:   Lab Results   Component Value Date LIPASE 105.6 08/30/2016    CBC: Recent Labs     05/22/19  1830 05/23/19  0333   WBC 6.9 7.5   HGB 12.9* 12.6*    186     Calcium:  Recent Labs     05/23/19  0333   CALCIUM 8.7     Ionized Calcium:No results for input(s): IONCA in the last 72 hours. Magnesium:No results for input(s): MG in the last 72 hours. Phosphorus:No results for input(s): PHOS in the last 72 hours. Ludmila Aidan UA: No results for input(s): SPECGRAV, PHUR, COLORU, CLARITYU, MUCUS, PROTEINU, BLOODU, RBCUA, WBCUA, BACTERIA, NITRU, GLUCOSEU, BILIRUBINUR, UROBILINOGEN, KETUA, LABCAST, LABCASTTY, AMORPHOS in the last 72 hours. Invalid input(s): CRYSTALS    Imaging: reviewed  Micro: No results found for: [facility]  Impression/ Plan:  1, CAD  2, Angina  3, Hypertension  5, CKD stage 4 - baseline serum creatinine of 2.2-2.5 mg/dL  6, Diabetes  7, Chronic Congestive heart failure. Plan  - change IV fluids to sodium bicarb drip as slight acidosis noted (0.45 NS + 75 mEQ of sodium bicarbonate at 100 ml/hr)  - hold Losartan  - s/p mucomyst   - obtain urinalysis   - check bladder scan and record PVRS    I had extensive discussion with the patient and explained to him the risk of worsening renal function even to the point of needing dialysis if exposed to IV contrast during cardiac catherization. Patient said that he has thought a lot about it and knows the risk and agreed to proceed with cardiac cath. Recommend minimizing dose of contrast and use of Visipaque if feasible.     Thank you  for allowing us to participate in care of Kenneth Gonsales     Electronically signed by Carole Garzon MD on 5/23/2019 at 03:40 pm

## 2019-05-24 NOTE — PLAN OF CARE
verbalize feelings about condition will improve  Outcome: Ongoing  Note:   Patient stated \"feels tired\" today     Problem: Coping:  Goal: Ability to identify strategies to decrease anxiety will improve  Description  Ability to identify strategies to decrease anxiety will improve  Outcome: Ongoing  Note:   Mood stable     Problem: Health Behavior:  Goal: Ability to identify changes in lifestyle to reduce recurrence of condition will improve  Description  Ability to identify changes in lifestyle to reduce recurrence of condition will improve  Outcome: Ongoing  Note:   Ability to change lifetstyles     Problem: Health Behavior:  Goal: Ability to manage health-related needs will improve  Description  Ability to manage health-related needs will improve  Outcome: Ongoing  Note:   Ability to manage health related needs     Problem: Nutritional:  Goal: Ability to identify appropriate dietary choices will improve  Description  Ability to identify appropriate dietary choices will improve  Outcome: Ongoing  Note:   Cardiac diet. NPO at this time     Problem: Respiratory:  Goal: Ability to maintain adequate ventilation will improve  Description  Ability to maintain adequate ventilation will improve  Outcome: Ongoing  Note:   Room air. CPAP at HS     Problem: Respiratory:  Goal: Levels of oxygenation will improve  Description  Levels of oxygenation will improve  5/24/2019 1042 by Tessa Velez RN  Outcome: Ongoing  Note:   See vitals. WNL     Problem: Sensory:  Goal: Pain level will decrease  Description  Pain level will decrease  5/24/2019 1042 by Tessa Velez RN  Outcome: Ongoing  Note:   Acute back pain repositioning and pillow support. Problem: Discharge Planning:  Goal: Discharged to appropriate level of care  Description  Discharged to appropriate level of care  5/24/2019 1042 by Tessa Velez RN  Outcome: Ongoing  Note:   Discharge to home pending.      Problem: Falls - Risk of:  Goal: Will remain free from falls  Description  Will remain free from falls  5/24/2019 1042 by Eugenia Nguyen RN  Outcome: Ongoing  Note:   Free from falls. Problem: Falls - Risk of:  Goal: Absence of physical injury  Description  Absence of physical injury  Outcome: Ongoing  Note:   Free from injury    Care plan reviewed with patient. Patient verbalize understanding of the plan of care and contribute to goal setting.

## 2019-05-24 NOTE — CARE COORDINATION
5/24/19  10:07 AM    Brief revisit with pt today as he is reclining up in chair. IMM obtained. Cardiac Cath planned for 3 pm today, however creat is 2.3 today. IVF, Sodium Bicarb and Mucamyst.  Nephrology consulted.

## 2019-05-24 NOTE — PROCEDURES
A Bladder scan was performed at 2305 . The patient's last void was at 2255 . The residual amount was measured to be 27 ML. Report of results was given to [facility].

## 2019-05-24 NOTE — FLOWSHEET NOTE
05/23/19 2243   Provider Notification   Reason for Communication Review case   Provider Name [facility]   Provider Notification Physician   Method of Communication Call   Response See orders   Notification Time 2246     Dr. MANN [facility] called the floor to check on the patient. She informed that she discontinued Losartan. Changed normal saline to Sodium Bicarb/normal saline. Post void bladder scan.

## 2019-05-25 VITALS
WEIGHT: 279.8 LBS | HEART RATE: 65 BPM | BODY MASS INDEX: 37.9 KG/M2 | OXYGEN SATURATION: 95 % | DIASTOLIC BLOOD PRESSURE: 77 MMHG | HEIGHT: 72 IN | RESPIRATION RATE: 16 BRPM | TEMPERATURE: 97.8 F | SYSTOLIC BLOOD PRESSURE: 157 MMHG

## 2019-05-25 LAB
ANION GAP SERPL CALCULATED.3IONS-SCNC: 16 MEQ/L (ref 8–16)
BUN BLDV-MCNC: 36 MG/DL (ref 7–22)
CALCIUM SERPL-MCNC: 8.6 MG/DL (ref 8.5–10.5)
CHLORIDE BLD-SCNC: 106 MEQ/L (ref 98–111)
CO2: 17 MEQ/L (ref 23–33)
CREAT SERPL-MCNC: 2.2 MG/DL (ref 0.4–1.2)
EKG ATRIAL RATE: 79 BPM
EKG Q-T INTERVAL: 386 MS
EKG QRS DURATION: 108 MS
EKG QTC CALCULATION (BAZETT): 428 MS
EKG R AXIS: 83 DEGREES
EKG T AXIS: -27 DEGREES
EKG VENTRICULAR RATE: 74 BPM
GFR SERPL CREATININE-BSD FRML MDRD: 29 ML/MIN/1.73M2
GLUCOSE BLD-MCNC: 125 MG/DL (ref 70–108)
GLUCOSE BLD-MCNC: 161 MG/DL (ref 70–108)
GLUCOSE BLD-MCNC: 246 MG/DL (ref 70–108)
GLUCOSE BLD-MCNC: 253 MG/DL (ref 70–108)
POTASSIUM SERPL-SCNC: 3.9 MEQ/L (ref 3.5–5.2)
SODIUM BLD-SCNC: 139 MEQ/L (ref 135–145)

## 2019-05-25 PROCEDURE — 93010 ELECTROCARDIOGRAM REPORT: CPT | Performed by: INTERNAL MEDICINE

## 2019-05-25 PROCEDURE — 82948 REAGENT STRIP/BLOOD GLUCOSE: CPT

## 2019-05-25 PROCEDURE — 2709999900 HC NON-CHARGEABLE SUPPLY

## 2019-05-25 PROCEDURE — 80048 BASIC METABOLIC PNL TOTAL CA: CPT

## 2019-05-25 PROCEDURE — 36415 COLL VENOUS BLD VENIPUNCTURE: CPT

## 2019-05-25 PROCEDURE — 6370000000 HC RX 637 (ALT 250 FOR IP): Performed by: INTERNAL MEDICINE

## 2019-05-25 PROCEDURE — 2580000003 HC RX 258: Performed by: INTERNAL MEDICINE

## 2019-05-25 RX ADMIN — BUMETANIDE 2 MG: 1 TABLET ORAL at 09:31

## 2019-05-25 RX ADMIN — ATENOLOL 50 MG: 50 TABLET ORAL at 09:31

## 2019-05-25 RX ADMIN — ASPIRIN 325 MG: 325 TABLET, COATED ORAL at 09:30

## 2019-05-25 RX ADMIN — ACETYLCYSTEINE 500 MG: 500 TABLET, EFFERVESCENT ORAL at 09:28

## 2019-05-25 RX ADMIN — POTASSIUM CHLORIDE 10 MEQ: 20 TABLET, EXTENDED RELEASE ORAL at 09:32

## 2019-05-25 RX ADMIN — Medication 1 TABLET: at 09:34

## 2019-05-25 RX ADMIN — SERTRALINE HYDROCHLORIDE 100 MG: 100 TABLET, FILM COATED ORAL at 09:33

## 2019-05-25 RX ADMIN — CLOPIDOGREL BISULFATE 75 MG: 75 TABLET, FILM COATED ORAL at 09:31

## 2019-05-25 RX ADMIN — Medication 10 ML: at 09:36

## 2019-05-25 RX ADMIN — INSULIN LISPRO 3 UNITS: 100 INJECTION, SOLUTION INTRAVENOUS; SUBCUTANEOUS at 14:25

## 2019-05-25 ASSESSMENT — PAIN SCALES - GENERAL
PAINLEVEL_OUTOF10: 0
PAINLEVEL_OUTOF10: 0

## 2019-05-25 NOTE — PROGRESS NOTES
Discharge teaching and instructions for diagnosis/procedure of CAD completed with patient using teachback method. AVS reviewed. Patient voiced understanding regarding prescriptions, follow up appointments, and care of self at home. Discharged in a wheelchair to  independent living per family.

## 2019-05-25 NOTE — PLAN OF CARE
Problem: Pain:  Goal: Pain level will decrease  Description  Pain level will decrease  Outcome: Ongoing  Note:   Pt denies pain at this time. Will continue to monitor. Problem: Activity:  Goal: Risk for activity intolerance will decrease  Description  Risk for activity intolerance will decrease  Outcome: Ongoing  Note:   Pt SOB with activity. CPAP worn at HS. Problem: Cardiac:  Goal: Hemodynamic stability will improve  Description  Hemodynamic stability will improve  Outcome: Ongoing  Note:   BP elevated. PO Hydralazine administered per order when needed. Will continue to monitor. Problem: Respiratory:  Goal: Levels of oxygenation will improve  Description  Levels of oxygenation will improve  Outcome: Ongoing  Note:   Patient O2 Sat >90 this shift. Oxygen placed as needed per order. Problem: Sensory:  Goal: Pain level will decrease  Description  Pain level will decrease  Outcome: Ongoing  Note:   Pt denies pain at this time. Will continue to monitor. Problem: Discharge Planning:  Goal: Discharged to appropriate level of care  Description  Discharged to appropriate level of care  Outcome: Ongoing  Note:   Patient plans to discharge home when medically stable. No barriers noted at this time. Problem: Falls - Risk of:  Goal: Will remain free from falls  Description  Will remain free from falls  Outcome: Ongoing  Note:   Patient free from falls. Bed in low, locked position with 2/4 rails up and bed alarm on. Fall band intact. Call light, bedside table and personal belongings within reach. Care plan reviewed with patient. Patient verbalize understanding of the plan of care and contribute to goal setting.

## 2019-05-25 NOTE — OP NOTE
[address] [address]  Sedation/Analgesia Post Sedation Record      Pt Name: Juice Carter  MRN: [id number]  YOB: 1945  Procedure Performed By: Bebo Graves MD  Primary Care Physician: Mildred Camejo MD    POST-PROCEDURE    Physician: Bebo Graves MD    Procedure Performed:  Stent circ lad and left main Percutaneous Coronary Intervention    Sedation/Anesthesia:  Local Anesthesia and IV Conscious Sedation with continuous O2 monitoring    Estimated Blood Loss:  Minimal    Specimens Removed:  None    Complications:  None     Post Procedure Diagnosis/Findings:  Coronary Artery Disease    Recommendations:  Medical treatment and review films.        Bebo Graves MD  Electronically signed 5/24/2019 at 8:19 PM

## 2019-05-25 NOTE — PLAN OF CARE
Problem: Cardiac:  Goal: Ability to maintain an adequate cardiac output will improve  Description  Ability to maintain an adequate cardiac output will improve  Note:   Patient received pamphlet about cardiac intervention, how to take care of the incision site, mended hearts program, cardiac rehab and the hours of operations, and Nutritional information regarding cardiac diet. Care plan reviewed with patient. Patient verbalize understanding of the plan of care and contribute to goal setting.

## 2019-05-26 ENCOUNTER — CARE COORDINATION (OUTPATIENT)
Dept: CASE MANAGEMENT | Age: 74
End: 2019-05-26

## 2019-05-26 NOTE — CARE COORDINATION
[facility] Transitions Initial Follow Up Call    Call within 2 business days of discharge: Yes    Patient: Eduarda Sanabria Patient : 1945   MRN: [id number]  Reason for Admission: cardiac cath  Discharge Date: 19 RARS: Readmission Risk Score: 25      Last Discharge [facility]       Complaint Diagnosis Description Type Department Provider    19   Admission (Discharged) Price Davis MD           Spoke with: [address]: [facility]    Non-face-to-face services provided:  Scheduled appointment with PCP-pt stated he will call Tu for appt  Scheduled appointment with Benito Cruz stated he will call Tues for an apt  Obtained and reviewed discharge summary and/or continuity of care documents    Care Transitions 24 Hour Call    Do you have any ongoing symptoms?:  No  Do you have a copy of your discharge instructions?:  Yes  Do you have all of your prescriptions and are they filled?:  Yes  Have you scheduled your follow up appointment?:  No  Were you discharged with any Home Care or Post Acute Services:  No  Patient Home Equipment:  CPAP, Other (Comment: [employer])  Do you have support at home?:  Partner/Spouse/SO  Do you feel like you have everything you need to keep you well at home?:  Yes  Are you an active caregiver in your home?:  Yes  Care Transitions Interventions  No Identified Needs     Called pt for the care transition initial follow up call, explained the role of the CTC. Pt reported he was admitted for a \"cardiac cath & had 3 stents\"  Pt stated this is # 28 cardiac cath, he did not need to review post op instructions. Pt stated he will remove the groin dressing today & shower. Pt also declined politely to review meds, no changes. Pt denied any needs or concerns. CTC will continue to follow.     Follow Up  Future Appointments   Date Time Provider Dean Simpson   2019  8:30 AM Ronnell Carvajal MD Pul [facility]   2019  9:45 AM Priya Lancaster

## 2019-05-28 NOTE — TELEPHONE ENCOUNTER
[facility] Transitions Initial Follow Up Call    Call within 2 business days of discharge: No     Patient: Gloria Mcmanus Patient : [birth year]   MRN: [id number]  Reason for Admission: Acute MI, Heart Failure  Discharge Date: 19 RARS: Readmission Risk Score: 25       Spoke with: patient    Discharge department/facility: [facility]  Non-face-to-face services provided:  Scheduled appointment with PCP-Dr. Royal Number    Follow Up  Future Appointments   Date Time Provider Dean Simpson   2019  8:30 AM Nicki Jovel   2019  9:45 AM Mya Faulkner Urology [facility] [address] [facility] [address]       Have the medications prescribed at discharge been filled? No  Does the patient understand what the medications are for? Na  Was the patient given a follow up appointment at discharge? No  If no appointment was made at the time of discharge, has the patient scheduled a follow up appointment? Yes   If yes, confirm appointment date and time   If no, schedule appointment  Has the patient experienced any new symptoms or have previous symptoms worsened? No  Is the patient experiencing any pain? No   If yes, is the pain well controlled? Yes  Does the patient understand all the discharge instructions? Yes  Did someone talk to the patient and/or family about the patient needs prior to being discharged? Yes  Did the patient's doctor communicate well? Yes  Did the patient's nurse communicate well? Yes  Was the patient satisfied with the services and care received at [address] [place] [address]? Yes  Would the patient like someone to follow up about their concerns? No  Was there one particular person or group of persons while at [address] [place] [address] the patient would like to recognize? No  Does the patient have any suggestions on what we could do better? No  Has this call helped the patient feel better about their plan of care?   Yes

## 2019-05-28 NOTE — PROGRESS NOTES
Cardiac rehabilitation referral received.   Follow up phone call made to patient at home for referral to cardiac rehab in [facility] or [facility] since patient lives in [place]

## 2019-05-29 ENCOUNTER — OFFICE VISIT (OUTPATIENT)
Dept: FAMILY MEDICINE CLINIC | Age: 74
End: 2019-05-29
Payer: MEDICARE

## 2019-05-29 ENCOUNTER — CARE COORDINATION (OUTPATIENT)
Dept: CASE MANAGEMENT | Age: 74
End: 2019-05-29

## 2019-05-29 VITALS
DIASTOLIC BLOOD PRESSURE: 59 MMHG | BODY MASS INDEX: 36.7 KG/M2 | HEIGHT: 72 IN | WEIGHT: 271 LBS | SYSTOLIC BLOOD PRESSURE: 128 MMHG | RESPIRATION RATE: 12 BRPM | HEART RATE: 76 BPM

## 2019-05-29 DIAGNOSIS — E11.65 UNCONTROLLED TYPE 2 DIABETES MELLITUS WITH HYPERGLYCEMIA (HCC): ICD-10-CM

## 2019-05-29 DIAGNOSIS — Z95.5 S/P CORONARY ARTERY STENT PLACEMENT: ICD-10-CM

## 2019-05-29 DIAGNOSIS — I50.42 CHRONIC COMBINED SYSTOLIC AND DIASTOLIC CONGESTIVE HEART FAILURE (HCC): ICD-10-CM

## 2019-05-29 DIAGNOSIS — I25.110 CORONARY ARTERY DISEASE INVOLVING NATIVE CORONARY ARTERY OF NATIVE HEART WITH UNSTABLE ANGINA PECTORIS (HCC): Primary | ICD-10-CM

## 2019-05-29 DIAGNOSIS — I27.20 PULMONARY HYPERTENSION (HCC): ICD-10-CM

## 2019-05-29 DIAGNOSIS — I50.9 CONGESTIVE HEART FAILURE OF UNKNOWN ETIOLOGY (HCC): ICD-10-CM

## 2019-05-29 LAB — HBA1C MFR BLD: 9.2 %

## 2019-05-29 PROCEDURE — 99495 TRANSJ CARE MGMT MOD F2F 14D: CPT | Performed by: FAMILY MEDICINE

## 2019-05-29 PROCEDURE — 1111F DSCHRG MED/CURRENT MED MERGE: CPT | Performed by: FAMILY MEDICINE

## 2019-05-29 PROCEDURE — 83036 HEMOGLOBIN GLYCOSYLATED A1C: CPT | Performed by: FAMILY MEDICINE

## 2019-05-29 NOTE — PROGRESS NOTES
Post-Discharge Transitional Care Management Services or Hospital Follow Up      Gloria Mcmanus   YOB: 1945    Date of Office Visit:  5/29/2019  Date of Hospital Admission: 5/22/19  Date of Hospital Discharge: 5/25/19  Risk of hospital readmission (high >=14%. Medium >=10%) :Readmission Risk Score: 25    Mr. Lucia Davey comes today for follow up after admission to the hospital after a complex angioplasty of LAD, circumflex and left main artery. Due to his medical history he was admitted for re-procedure hydration before cardiac cath. His BUN and creatinine remained elevated so he was aggressively hydrated and he received Mucomyst.  He underwent complex a intervention of the left main, the ostium of the circumflex, the ostium of the LAD reducing the stenosis to 0%. Today he is feeling better, he can do much more physical activity without feeling tired or SOB. He was requiring Nitro daily before the procedure and now he is not requiring any. Diabetes: taking Lantus 36 in am and 36 in pm, plus 1.8 Victoza every morning,  Also he takes Humalog 5 U/15 carb count. He admits not to be diligent with his diet. The wife admits he likes sweats.      He has hx of Chronic combined systolic and diastolic CHF and Pulmonary HTB which are stable      Care management risk score Rising risk (score 2-5) and Complex Care (Scores >=6): 8     Non face to face  following discharge, date last encounter closed (first attempt may have been earlier): 5/26/2019  9:32 AM    Call initiated 2 business days of discharge: Yes    Patient Active Problem List   Diagnosis    Hyperlipidemia    Hypertension    Peripheral vascular disease ([address]    CAD (coronary artery disease)    Hypothyroid    S/P CABG x 4    T2DM (type 2 diabetes mellitus) ([address]    Incisional hernia    CKD (chronic kidney disease), stage III ([address]    Tachycardia-bradycardia syndrome ([address]    Dyspnea    CAP (community acquired pneumonia)    Diabetes mellitus, type II [facility]    CKD (chronic kidney disease)    Hypothyroidism    SOB (shortness of breath)    Acute diastolic heart failure (HCC)    Valvular heart disease    Pulmonary hypertension (HCC)    HTN (hypertension)    Chronic diarrhea    Hx of adenomatous colonic polyps    Diverticulosis    Microscopic colitis    GERD (gastroesophageal reflux disease)    H/O class III angina pectoris    Obesity due to excess calories    Prostate cancer ([facility]    S/P prostatectomy    Chest pain    Urinary retention    MI, acute, non ST segment elevation (HCC)    Type 2 diabetes mellitus with hyperglycemia (HCC)    Unstable angina (HCC)    GAURI on CPAP    COPD (chronic obstructive pulmonary disease) (HCC)    Renal failure    Family history of class III angina pectoris    Chronic combined systolic and diastolic congestive heart failure (HCC)    Abnormal nuclear stress test    Obesity (BMI 30.0-34. 9)    CAD in native artery    Congestive heart failure of unknown etiology ([facility]    Peripheral edema    Chronic cough    Nodule of right lung    Tobacco abuse    3-vessel coronary artery disease       Allergies   Allergen Reactions    Tape [Adhesive Tape] Other (See Comments)     Rash and skin breakdown       Medications [facility] as [facility] the [facility] Medication Instructions [facility]O:    Printed on:05/29/19 8674   Medication Information                      albuterol sulfate HFA (PROVENTIL HFA) 108 (90 BASE) MCG/ACT inhaler  Inhale 2 puffs into the lungs every 6 hours as needed for Wheezing             aspirin 81 MG EC tablet  Take 81 mg by mouth daily             atenolol (TENORMIN) 50 MG tablet  TAKE 1 TABLET DAILY             atorvastatin (LIPITOR) 80 MG tablet  TAKE 1 TABLET DAILY             B-D ULTRAFINE III SHORT PEN 31G X 8 MM MISC  USE TO INJECT INSULIN UNDER THE SKIN FOUR TIMES A DAY             bumetanide (BUMEX) 2 MG tablet  Take 1 tablet by mouth 2 times daily              calcitRIOL (ROCALTROL) 0.25 MCG capsule  Take 0.25 mcg by mouth every other day              Cholecalciferol (VITAMIN D3) 5000 units TABS  Take 5,000 Units by mouth daily             clopidogrel (PLAVIX) 75 MG tablet  Take 1 tablet by mouth daily             CPAP Machine MISC  by Does not apply route Please change APAP pressure to 12 to 20 cm H20.             fluticasone (FLONASE) 50 MCG/ACT nasal spray  1 spray by Nasal route daily             glucagon 1 MG injection  Inject 1 mg into the skin See Admin Instructions. Follow package directions for low blood sugar.              glucose blood VI test strips (ACCU-CHEK TERESA PLUS) strip  Check BS 4 times a day             hydrALAZINE (APRESOLINE) 50 MG tablet  Take 50 mg by mouth 3 times daily as needed Take for systolic BP greater than 423             insulin glargine (LANTUS SOLOSTAR) 100 UNIT/ML injection pen  Inject SQ 36Units in am, 36U in pm             insulin lispro (HUMALOG KWIKPEN) 100 UNIT/ML pen  INJECT PER SLIDING SCALE THREE TIMES A DAY BEFORE MEALS (MAX OF 80 UNITS DAILY)             levothyroxine (SYNTHROID) 75 MCG tablet  TAKE 1 TABLET PO DAILY             losartan (COZAAR) 50 MG tablet  Take 1 tablet by mouth daily             Multiple Vitamin (MULTI-VITAMIN) TABS    Take 1 tablet by mouth daily              nitroGLYCERIN (NITROLINGUAL) 0.4 MG/SPRAY 0.4 mg spray  Place 1 spray under the tongue as needed             potassium chloride (KLOR-CON M) 10 MEQ extended release tablet  Take 1 tablet by mouth 2 times daily             rivaroxaban (XARELTO) 10 MG TABS tablet  Take 0.5 tablets by mouth daily (with breakfast)             sertraline (ZOLOFT) 100 MG tablet  TAKE 1 TABLET DAILY             VICTOZA 18 MG/3ML SOPN SC injection  INJECT 1.8 MG INTO THE SKIN DAILY                   Medications marked \"taking\" at this time  Outpatient Medications Marked as Taking for the 5/29/19 encounter (Office Visit) with Stanford Castellano MD Medication Sig Dispense Refill    Cholecalciferol (VITAMIN D3) 5000 units TABS Take 5,000 Units by mouth daily      clopidogrel (PLAVIX) 75 MG tablet Take 1 tablet by mouth daily 30 tablet 3    rivaroxaban (XARELTO) 10 MG TABS tablet Take 0.5 tablets by mouth daily (with breakfast) 30 tablet 1    levothyroxine (SYNTHROID) 75 MCG tablet TAKE 1 TABLET PO DAILY 90 tablet 1    atenolol (TENORMIN) 50 MG tablet TAKE 1 TABLET DAILY 90 tablet 1    fluticasone (FLONASE) 50 MCG/ACT nasal spray 1 spray by Nasal route daily 1 Bottle 5    insulin lispro (HUMALOG KWIKPEN) 100 UNIT/ML pen INJECT PER SLIDING SCALE THREE TIMES A DAY BEFORE MEALS (MAX OF 80 UNITS DAILY) 60 mL 1    insulin glargine (LANTUS SOLOSTAR) 100 UNIT/ML injection pen Inject SQ 36Units in am, 36U in pm 60 mL 2    losartan (COZAAR) 50 MG tablet Take 1 tablet by mouth daily 90 tablet 1    sertraline (ZOLOFT) 100 MG tablet TAKE 1 TABLET DAILY 90 tablet 1    VICTOZA 18 MG/3ML SOPN SC injection INJECT 1.8 MG INTO THE SKIN DAILY 27 mL 2    atorvastatin (LIPITOR) 80 MG tablet TAKE 1 TABLET DAILY 90 tablet 1    CPAP Machine MISC by Does not apply route Please change APAP pressure to 12 to 20 cm H20. 1 each 0    potassium chloride (KLOR-CON M) 10 MEQ extended release tablet Take 1 tablet by mouth 2 times daily 60 tablet 3    hydrALAZINE (APRESOLINE) 50 MG tablet Take 50 mg by mouth 3 times daily as needed Take for systolic BP greater than 934      nitroGLYCERIN (NITROLINGUAL) 0.4 MG/SPRAY 0.4 mg spray Place 1 spray under the tongue as needed  0    aspirin 81 MG EC tablet Take 81 mg by mouth daily      calcitRIOL (ROCALTROL) 0.25 MCG capsule Take 0.25 mcg by mouth every other day       bumetanide (BUMEX) 2 MG tablet Take 1 tablet by mouth 2 times daily       albuterol sulfate HFA (PROVENTIL HFA) 108 (90 BASE) MCG/ACT inhaler Inhale 2 puffs into the lungs every 6 hours as needed for Wheezing 1 Inhaler 3    glucose blood VI test strips (ACCU-CHEK TERESA PLUS) strip Check BS 4 times a day 400 each 1    B-D ULTRAFINE III SHORT PEN 31G X 8 MM MISC USE TO INJECT INSULIN UNDER THE SKIN FOUR TIMES A  each 1    glucagon 1 MG injection Inject 1 mg into the skin See Admin Instructions. Follow package directions for low blood sugar. 1 kit 3    Multiple Vitamin (MULTI-VITAMIN) TABS   Take 1 tablet by mouth daily           Medications patient taking as of now reconciled against medications ordered at time of hospital discharge: Yes    Chief Complaint   Patient presents with   [address] from [facility] 5/22/2019 - 5/25/2019 Angioplasty procedure  with 3 stents placed        History of Present illness - Follow up of Hospital diagnosis(es): CAD    Inpatient course: Discharge summary reviewed- see chart. Interval history/Current status: improved    A comprehensive review of systems was negative except for what was noted in the HPI. Vitals:    05/29/19 1027   BP: (!) 128/59   Site: Left Upper Arm   Position: Sitting   Cuff Size: Large Adult   Pulse: 76   Resp: 12   Weight: 271 lb (122.9 kg)   Height: 6' (1.829 m)     Body mass index is 36.75 kg/m².    Wt Readings from Last 3 Encounters:   05/29/19 271 lb (122.9 kg)   05/24/19 279 lb 12.8 oz (126.9 kg)   04/24/19 273 lb 9.6 oz (124.1 kg)     BP Readings from Last 3 Encounters:   05/29/19 (!) 128/59   05/25/19 (!) 157/77   04/24/19 128/60        Physical Exam:  General Appearance: alert and oriented to person, place and time, well developed and well- nourished, in no acute distress  Skin: warm and dry, no rash or erythema  Head: normocephalic and atraumatic  Eyes: pupils equal, round, and reactive to light, extraocular eye movements intact, conjunctivae normal  ENT: tympanic membrane, external ear and ear canal normal bilaterally, nose without deformity, nasal mucosa and turbinates normal without polyps  Neck: supple and non-tender without mass, no thyromegaly or thyroid nodules, no cervical lymphadenopathy  Pulmonary/Chest: clear to auscultation bilaterally- no wheezes, rales or rhonchi, normal air movement, no respiratory distress  Cardiovascular: normal rate, regular rhythm, normal S1 and S2, no murmurs, rubs, clicks, or gallops, distal pulses intact, no carotid bruits  Abdomen: soft, non-tender, non-distended, normal bowel sounds, no masses or organomegaly  Extremities: no cyanosis, clubbing or edema    Assessment/Plan:  1. Coronary artery disease involving native coronary artery of native heart with unstable angina pectoris ([address]    - [place] DISCHARGE MEDS RECONCILED W/ CURRENT OUTPATIENT MED LIST    2. S/P coronary artery stent placement      3. Uncontrolled type 2 diabetes mellitus with hyperglycemia (HCC)    - POCT glycosylated hemoglobin (Hb A1C)  - Increase Lantus 2 U per day (divided by 2 doses) every week, if average blood sugar of last 3 readings is above 110.    Follow up in 6 weeks        Medical Decision Making: moderate complexity     Pressley Shone, MD

## 2019-05-29 NOTE — DISCHARGE SUMMARY
[address] [place] [address]                               DISCHARGE SUMMARY    PATIENT NAME: Collie Gitelman                     :        1945  MED REC NO:   [id number]                           ROOM:       [id number]  ACCOUNT NO:   [de-identified]                           ADMIT DATE: 2019  PROVIDER:     Ephraim Jameson. Kobi Shah M.D.               Estrella Sers: 2019    FINAL DIAGNOSES:  1. Crescendo angina pectoris. 2.  Atherosclerotic coronary artery disease. 3.  History of CABG. 4.  Diabetes mellitus. 5.  History of hypertension. 6.  COPD. 7.  Chronic renal failure. 8.  Diabetic retinopathy. 9.  History of mild anemia. 10.  Obesity. 11.  Sleep apnea. 12.  History of dyslipidemia. PROCEDURE PERFORMED DURING THIS HOSPITALIZATION:  Complex angioplasty of  the LAD, circumflex, and left main under IV conscious sedation. BRIEF HISTORY:  This is a 50-year-old gentleman with history of coronary  artery disease, history of prior intervention. This patient has been  complaining of chest pain, angina pectoris class III, had an abnormal  nuclear stress test, high-risk scan. The patient was treated medically,  continued to be symptomatic. Heart catheterization showed severe  stenosis of the distal left main, the ostium of the circumflex, the  ostium of the LAD. The patient was admitted for intervention. The patient has renal  failure. He was admitted for rehydration. He was seen by nephrology  service. HOSPITALIZATION COURSE:  The patient was admitted the day before. His  BUN and creatinine were still elevated. The patient was hydrated with  IV fluids. He was given _____. This patient was seen by nephrology  service. The patient was hydrated. He stayed in the hospital an extra day for  that. The Mucomyst was given. The patient was then taken to the cath  lab.   He underwent complex intervention of the left main, the ostium of  the circumflex, the ostium of the LAD reducing the stenosis to 0%,  MILAGRO-3 flow. The patient tolerated the procedure well. The patient has  dual antiplatelet treatments. The patient ambulated and continued to be  stable. He was discharged home. He will be seen in the office in two  to three weeks. He is to follow up with family physician and seek  medical attention if he has any change in clinical condition. Charla Barreto M.D.    D: 05/28/2019 7:53:50       T: 05/29/2019 1:53:45     [email or web address]  Job#: [id number]     Doc#: [id number]    CC:  Khanh Valencia M.D.        Referring Service

## 2019-05-29 NOTE — PROCEDURES
wire was placed into the circumflex. The circumflex artery was  then dilated utilizing a 4.0 x 50 mm PTCA balloon. This was followed by  placement of a drug eluting stent, 4 mm x 80 mm length stent and  deployed at 16 atmospheric pressure, increasing the effective size of  the stent to 4.5 mm, reducing the stenosis to a 0% MILAGRO-3 flow. After that, the ostium of the LAD was dilated utilizing a 3.5 x 10 mm  length cutting balloon, which was placed at the ostium and then also in  the mid diagonal artery. The LAD was then stented utilizing 3 or 4 mm x 8 mm length Resolute  drug-eluting stent. This was placed at the ostium. The circumflex and  the left main was then entered utilizing a 4.0 Resolute drug-eluting  stent and this was dilated up to 4.5 mm. At the end, there was  reduction of the stenosis to a 0% MILAGRO-3 flow. 3.  The angiogram of the right iliac artery showed patent right iliac  artery with a good distal runoff and successful deployment of the  AngioSeal closure device. IMPRESSION:  1. Successful angioplasty cutting balloon, stenting of the mid  circumflex utilizing Resolute drug-eluting stent. 2.  Successful angioplasty cutting balloon at the ostium and proximal  portion of the circumflex. 3.  Successful angioplasty cutting balloon and stent deployment at the  ostium of the LAD and cutting balloon angioplasty of the proximal  portion of the circumflex. 4.  Angioplasty stent of the left main. IMPRESSION:  1. Successful complex angioplasty stent deployment of the LAD. 2.  Successful angioplasty stent balloon of the mid and the proximal  circumflex. 3.  Successful angioplasty stent of the left main. 4.  Successful deployment of the AngioSeal closure device. We recommend aggressive medical treatment, risk factor modification,  periodic followup, continue on antiplatelet treatment, and cardiac  rehab.         Lilli Andrews M.D.    D: 05/29/2019 8:10:33       T: 05/29/2019 10:28:03 [email or web address]  Job#: [id number]     Doc#: [id number]    CC:

## 2019-06-03 ENCOUNTER — CARE COORDINATION (OUTPATIENT)
Dept: CASE MANAGEMENT | Age: 74
End: 2019-06-03

## 2019-06-11 NOTE — CARE COORDINATION
30.0-34. 9)    CAD in native artery    Congestive [facility] failure of unknown etiology [facility]    Peripheral edema    Chronic cough    Nodule of right lung    Tobacco abuse    3-vessel coronary artery disease     Summary:  Met with staci Downey self/role. Presented to ED for evaluation of epistaxis and rectal bleeding. Patient had recent heart catheterization (5/22), complex angioplasty of LAD, Circumflex, and left main, on ASA, plavix and xarelto. Patient resides at home with his wife. Patient drives and is independent with ADL's. Patient denies assistive device use prior to admission. Patient has a scale, blood pressure cuff, and glucometer for chronic disease management. Patient is currently wearing a Lifevest from 04/17/2019 admission. Uses CPAP. Wearing Lifevest. Denies further needs/assistance at home. Plan is for discharge. Follow up with Dr. Celina Alvarez and Dr. Joylene Essex (covering for Dr. Beau Anand). Patient to hold xarelto until seen by Dr. Joylene Essex. Patient educated on s/s to report or return to ED. Questions answered. Denies further needs/assistance at this time. Patient given my contact information and advised to call with any questions/concerns/needs/assistance, verbalized understanding.      Follow up appointments:    Future Appointments   Date Time Provider Dean Simpson   6/24/2019  9:45 AM Read Kanner, [address]   7/12/2019  9:45 AM Mya Upton [facility] [email or web address]   7/22/2019 11:30 AM [facility] [address]       Review Due Health Maintenance:  Health Maintenance Due   Topic Date Due    Shingles Vaccine (2 of 3) 07/16/2017    Diabetic retinal exam  04/11/2019     SEDRICK LindoN  [phone]

## 2019-06-11 NOTE — ED NOTES
Patient presents to ED for rectal bleeding and epistaxis that began this morning. Patient states he woke up to his CPAP mask full of blood this morning. Reports one episode of black stool today. Denies any pain. Shows no signs of distress. Skin warm and dry. Respirations easy and unlabored.       Concepción Nowak RN  06/11/19 1538

## 2019-06-11 NOTE — ED NOTES
Patient states at the door of room 23 stating the bleeding has stopped and he is ready to go home. Dr. Louise Mosher agrees patient can be discharged at this time. Discharge instructions reviewed again. Patient left with wife, ambulatory with steady gait.       Ruby Peter RN  06/11/19 4227

## 2019-06-11 NOTE — ED PROVIDER NOTES
has a past medical history of Angina pectoris ([facility] [address] Blood circulation, collateral, CAD (coronary artery disease), Cancer ([address] Cancer ([address] Carotid artery disease ([address] Cerebral artery occlusion with cerebral infarction ([address] CHF (congestive heart failure) ([address] CKD (chronic kidney disease), stage III ([address] Detached retina, Diabetes mellitus ([address] Diabetic nephropathy/sclerosis ([address] Diverticula of colon, GERD (gastroesophageal reflux disease), Hearing loss, History of blood transfusion, Hyperlipidemia, Hypertension, Hypothyroidism, Peripheral vascular disease ([address] Pneumonia, Prostate cancer ([address] Retinopathy due to secondary diabetes ([facility] [address] Tobacco abuse, Type II or unspecified type diabetes mellitus without mention of complication, not stated as uncontrolled, Vision blurred, Vitreous hemorrhage of right eye ([address] and Wears glasses. SURGICAL HISTORY    has a past surgical history that includes Tonsillectomy (child); Leg Surgery (1999); Appendectomy (1967); Cholecystectomy (1990); hernia repair (1989); retinal laser (December 2013); retinal laser; malignant skin lesion excision (2011); Refractive surgery (Bilateral); vitrectomy (2/13/14); Cardiac surgery (1989, 1999); Cardiac catheterization; other surgical history; vitrectomy (Right, 03/06/14); eye surgery (Feb and March 2014); Pacemaker insertion (2014); Cataract removal with implant (Bilateral, October 13, right; Oct 27, left eye); Colonoscopy (9/19/2006, 2015); vascular surgery; Endoscopy, colon, diagnostic; pacemaker placement; Prostatectomy (07/27/2016); Coronary artery bypass graft (1989); Coronary artery bypass graft (1999); Coronary artery bypass graft (08/23/2016); Upper gastrointestinal endoscopy (2017); and skin biopsy.     CURRENT MEDICATIONS       Previous Medications    ALBUTEROL SULFATE HFA (PROVENTIL HFA) 108 (90 BASE) MCG/ACT INHALER    Inhale 2 puffs into the lungs every 6 hours as needed for Wheezing    ASPIRIN 81 MG EC TABLET Take 81 mg by mouth daily    ATENOLOL (TENORMIN) 50 MG TABLET    TAKE 1 TABLET DAILY    ATORVASTATIN (LIPITOR) 80 MG TABLET    TAKE 1 TABLET DAILY    B-D ULTRAFINE III SHORT PEN 31G X 8 MM MISC    USE TO INJECT INSULIN UNDER THE SKIN FOUR TIMES A DAY    BUMETANIDE (BUMEX) 2 MG TABLET    Take 1 tablet by mouth 2 times daily     CALCITRIOL (ROCALTROL) 0.25 MCG CAPSULE    Take 0.25 mcg by mouth every other day     CHOLECALCIFEROL (VITAMIN D3) 5000 UNITS TABS    Take 5,000 Units by mouth daily    CLOPIDOGREL (PLAVIX) 75 MG TABLET    Take 1 tablet by mouth daily    CPAP MACHINE MISC    by Does not apply route Please change APAP pressure to 12 to 20 cm H20.    FLUTICASONE (FLONASE) 50 MCG/ACT NASAL SPRAY    1 spray by Nasal route daily    GLUCAGON 1 MG INJECTION    Inject 1 mg into the skin See Admin Instructions. Follow package directions for low blood sugar.     GLUCOSE BLOOD VI TEST STRIPS (ACCU-CHEK TERESA PLUS) STRIP    Check BS 4 times a day    HYDRALAZINE (APRESOLINE) 50 MG TABLET    Take 50 mg by mouth 3 times daily as needed Take for systolic BP greater than 157    INSULIN GLARGINE (LANTUS SOLOSTAR) 100 UNIT/ML INJECTION PEN    Inject SQ 36Units in am, 36U in pm    INSULIN LISPRO (HUMALOG KWIKPEN) 100 UNIT/ML PEN    INJECT PER SLIDING SCALE THREE TIMES A DAY BEFORE MEALS (MAX OF 80 UNITS DAILY)    LEVOTHYROXINE (SYNTHROID) 75 MCG TABLET    TAKE 1 TABLET PO DAILY    LOSARTAN (COZAAR) 50 MG TABLET    Take 1 tablet by mouth daily    MULTIPLE VITAMIN (MULTI-VITAMIN) TABS      Take 1 tablet by mouth daily     NITROGLYCERIN (NITROLINGUAL) 0.4 MG/SPRAY 0.4 MG SPRAY    Place 1 spray under the tongue as needed    POTASSIUM CHLORIDE (KLOR-CON M) 10 MEQ EXTENDED RELEASE TABLET    Take 1 tablet by mouth 2 times daily    RIVAROXABAN (XARELTO) 10 MG TABS TABLET    Take 0.5 tablets by mouth daily (with breakfast)    SERTRALINE (ZOLOFT) 100 MG TABLET    TAKE 1 TABLET DAILY    VICTOZA 18 MG/3ML SOPN SC INJECTION    INJECT 1.8 MG atraumatic. Right Ear: Tympanic membrane and external ear normal.   Left Ear: Tympanic membrane and external ear normal.   Nose: No nasal deformity or nasal septal hematoma. Epistaxis (right nare) is observed. Mouth/Throat: Oropharynx is clear and moist and mucous membranes are normal. No oropharyngeal exudate, posterior oropharyngeal edema or posterior oropharyngeal erythema. Eyes: Conjunctivae and EOM are normal.   Neck: Normal range of motion. Neck supple. No JVD present. Cardiovascular: Normal rate, regular rhythm, normal heart sounds, intact distal pulses and normal pulses. Exam reveals no gallop and no friction rub. No murmur heard. Pulmonary/Chest: Effort normal and breath sounds normal. No respiratory distress. He has no decreased breath sounds. He has no wheezes. He has no rhonchi. He has no rales. Abdominal: Soft. Bowel sounds are normal. He exhibits no distension. There is no tenderness. There is no rebound, no guarding and no CVA tenderness. Musculoskeletal: Normal range of motion. He exhibits no edema. Neurological: He is alert and oriented to person, place, and time. He exhibits normal muscle tone. Coordination normal.   Skin: Skin is warm and dry. No rash noted. He is not diaphoretic. Nursing note and vitals reviewed.       DIFFERENTIALDIAGNOSIS:   Epistaxis, GI bleed, abnormal lab values, medication reaction    DIAGNOSTIC RESULTS     EKG: All EKG's are interpreted by the Emergency Department Physician who either signs or Co-signs this chart in the absence of a cardiologist.  EKG interpreted by Kevin Van DO:    None    RADIOLOGY: non-plain film images(s) such as CT, Ultrasound and MRI are read by the radiologist.    No orders to display       LABS:   Labs Reviewed   BASIC METABOLIC PANEL W/ REFLEX TO MG FOR LOW K - Abnormal; Notable for the following components:       Result Value    CO2 18 (*)     Glucose 230 (*)     BUN 46 (*)     CREATININE 2.5 (*)     All other components within normal limits   CBC WITH AUTO DIFFERENTIAL - Abnormal; Notable for the following components:    RBC 3.93 (*)     Hemoglobin 12.5 (*)     Hematocrit 37.6 (*)     MCV 95.7 (*)     RDW-SD 48.2 (*)     All other components within normal limits   APTT - Abnormal; Notable for the following components:    aPTT 41.7 (*)     All other components within normal limits   GLOMERULAR FILTRATION RATE, ESTIMATED - Abnormal; Notable for the following components:    Est, Glom Filt Rate 25 (*)     All other components within normal limits   PROTIME-INR   BLOOD OCCULT STOOL SCREEN #1   ANION GAP   OSMOLALITY       EMERGENCY DEPARTMENT COURSE:   Vitals:    Vitals:    06/11/19 1255 06/11/19 1318   BP: (!) 156/66 130/89   Pulse: 65 62   Resp: 18 18   Temp: 97.3 °F (36.3 °C)    TempSrc: Oral    SpO2: 94% 95%   Weight: 260 lb (117.9 kg)    Height: 6' (1.829 m)        1:25 PM: The patient was seen and evaluated in a timely fashion. MDM:  The patient was seen within the ED today for the evaluation of epistaxis and dark colored stools. The patient arrived in no acute distress and in stable condition. Within the department, I observed the patient's vital signs to be within acceptable range. On exam, I appreciated epistaxis right nare. No septal hematoma. Heart and lungs clear to ascultation. Abdomen soft and non-tender. Laboratory work was reassuring. I observed the patient's condition to improve during the duration of her stay. I explained my proposed course of treatment to the patient, who was amenable to my decision, and I answered all questions that were asked. She was discharged home in stable condition with prescriptions for augmentin, and the patient will return to the ED if her symptoms become more severe in nature or otherwise change. I advised the patient to follow-up with Dr. Moses Sandoval. I also discussed return to ED precautions with the patient who verbalized understanding.      CRITICAL CARE:   None CONSULTS:  None    PROCEDURES:  None     FINAL IMPRESSION      1. Lower GI bleed    2. Epistaxis          DISPOSITION/PLAN   Discharge    PATIENT REFERRED TO:  Amanda Yee MD  [address] ANNABEL/Nasim Mendezshannansandip [address]  [phone]    Schedule an appointment as soon as possible for a visit today  remove nasal packing; STOP TAKING XARELTO UNTIL YOU SEE DR Brand 351. Shankar Fatima MD  [address] [address]  [phone]    Schedule an appointment as soon as possible for a visit today  restart you blood thinning medication as needed      DISCHARGE MEDICATIONS:  New Prescriptions    AMOXICILLIN-CLAVULANATE (AUGMENTIN) 875-125 MG PER TABLET    Take 1 tablet by mouth 2 times daily for 5 days       (Please note that portions of this note were completed with a voice recognition program.Efforts were made to edit thedictations but occasionally words are mis-transcribed.)    Scribe:  Mary Garcia 6/11/19 1:25 PM Scribing forand in the presence Noemí rFias DO. Signed by: Silvia Jennings, 06/11/19 2:44 PM    Provider:  I personally performed the services described in the documentation, reviewed and edited the documentation which was dictated to the scribe in my presence, and it accurately records mywords and actions.     Kennedy Thomson DO 6/11/19 2:44 PM                  Kennedy Thomson DO  08/01/19 5436

## 2019-06-11 NOTE — ED NOTES
Dr. Jennifer Orozco attempted to place rapid rhino and unable to do so due to patient's multiple previous nose fractures. Attempting to place epistaxis packing at this time.       Jeanette Geronimo RN  06/11/19 7580

## 2019-06-11 NOTE — CARE COORDINATION
Name: Shady Stafford    ### Patient Details  YOB: 1945  MRN: [id number]    ### Encounter Details  Encounter ID: [id number]  Arrival Date: N/A  Discharge Date: N/A    ### Related interaction  CHF-COPD-Diabetes High Touch UA (Welcome Call) ([email or web address])    ### Questions     Question 1   Consent   If you are interested in starting this program today, please press 1.. If you have questions or would like to talk to our , please press 2 and we will call you right back. If youd like to opt out of the program, please press 3   Opt Out (Issue Panel: Opt Out)    ### Required Interventions and Feedback     CarePATH Update         *Patient Status changed in CarePATH to[de-identified]     Patient Declined (selected by Sher Turcios on 06/11/2019 12:47 PM EDT)    Explain why patient opted out[de-identified]     utc (edited by Sher Turcios on 06/11/2019 12:47 PM EDT)     Call Status         *Call Status:     Other (Provide details below) (edited by Sher Turcios on 06/11/2019 12:47 PM EDT)    Additional Call Status Details[de-identified]     UTC left name and number on Patients  VM for return contact. Dhara METCALF RN.       (edited by Sher Turcios on 06/11/2019 12:47 PM EDT). JSL

## 2019-06-13 NOTE — CARE COORDINATION
Care Transition  ED Follow up Call    Reason for ED visit:  Epistaxis, rectal bleeding s/p cath on ASA, plavix, xarelto       Attempted to reach patient for Care Transitions ED follow up. Left message to return call. Contact information provided.      FU appts/Provider:    Future Appointments   Date Time Provider Dean Simpson   6/14/2019  3:15 PM GENEVIEVE White ENT [facility] CHARITY  [address]   6/24/2019  9:45 AM [facility]  [address]   7/12/2019  9:45 AM ZHANNA Upton - [address] [facility]  [address] [address]   7/22/2019 11:30 AM Christa Chapman MD SRPX [facility], [place]  [phone]  [phone]

## 2019-06-14 NOTE — PATIENT INSTRUCTIONS
Afrin (oxymetazoline) on your way home today. You will use this for three days. Three sprays to the right nostril, three times a day for the next 3 days. Begin using nasal saline gel in the nose starting next week when you restart using CPAP. Put this in your nose before bed, it will help keep your nose moist while using the CPAP.

## 2019-06-14 NOTE — PROGRESS NOTES
[address] [facility] [place], NOSE AND THROAT  [address] [address]  Dept: [phone]  Dept Fax: [phone]  Loc: [phone]    Renato Younger is a 75 y.o. male who was referred by No ref. provider found for:  Chief Complaint   Patient presents with    New Patient     Patient here ruth ann Epistaxis seen in [facility] ED needs packing removed.  Nithin HPI:     Patient presents today for right sided nasal packing removal.  The nasal packing was placed 6/11/19 in [facility] ED. The patient reports that the nasal packing has seemed to gained slightly more blood over the last 3 days. The patient usually wears a CPAP at night with humidification. The patient reports he has a headache from the pressure of the packing. The patient denies dizziness, nausea, emesis. He was previously taking Plavix, Xarelto, and aspirin. He reports his cardiologist told him to hold his Xarelto currently, but he may not resume it due to GI bleeds. He was told to continue his Plavix. Subjective:        Review of Systems   Constitutional: Negative for activity change, appetite change, chills, diaphoresis, fatigue, fever and unexpected weight change. HENT: Positive for nosebleeds. Negative for congestion, dental problem, ear discharge, ear pain, facial swelling, hearing loss, mouth sores, postnasal drip, rhinorrhea, sinus pressure, sneezing, sore throat, tinnitus, trouble swallowing and voice change. Eyes: Negative for visual disturbance. Respiratory: Negative for apnea, cough, choking, chest tightness, shortness of breath, wheezing and stridor. Cardiovascular: Negative for chest pain, palpitations and leg swelling. Gastrointestinal: Negative for abdominal pain, diarrhea, nausea and vomiting. Endocrine: Negative for cold intolerance, heat intolerance, polydipsia and polyuria.    Genitourinary: Negative for difficulty urinating, discharge, dysuria, enuresis, hematuria, penile pain, penile swelling, scrotal swelling, testicular pain and urgency. Musculoskeletal: Negative for arthralgias, gait problem, neck pain and neck stiffness. Skin: Negative for color change, rash and wound. Allergic/Immunologic: Negative for environmental allergies, food allergies and immunocompromised state. Neurological: Positive for headaches. Negative for dizziness, seizures, syncope, facial asymmetry, speech difficulty and light-headedness. Hematological: Negative for adenopathy. Does not bruise/bleed easily. Psychiatric/Behavioral: Negative for confusion, sleep disturbance and suicidal ideas. The patient is not nervous/anxious. Social History:    TOBACCO:   reports that he quit smoking about 17 years ago. His smoking use included cigarettes. He has a 60.00 pack-year smoking history. He has never used smokeless tobacco.  ETOH:   reports that he drinks about 0.6 oz of alcohol per week. Family History:       Problem Relation Age of Onset    Heart Disease Mother 43    Heart Attack Mother 43    Coronary Art Dis Mother 43    Colon Cancer Father     Lung Cancer Father     Heart Surgery Brother 64    Coronary Art Dis Brother 64        MI    Heart Disease Brother 64    Heart Disease Maternal Aunt     Coronary Art Dis Maternal Aunt     Heart Attack Maternal Aunt     Heart Disease Maternal Uncle     Coronary Art Dis Maternal Uncle     Heart Attack Maternal Uncle     Cancer Paternal Aunt         unknown     Cancer Paternal Uncle         unknown        Objective: This is a 75 y.o. male. Patient is alert and oriented to person, place and time. Patient appears well developed, well nourished. Mood is happy. Not obviously hearing impaired. /74 (Site: Left Upper Arm, Position: Sitting, Cuff Size: Medium Adult)   Pulse 84   Temp 97.6 °F (36.4 °C) (Oral)   Resp 20   Ht 6' (1.829 m)   Wt 264 lb 4.8 oz (119.9 kg)   BMI 35.85 kg/m²     Head:   Normocephalic, atraumatic.   No obvious masses or lesions noted. Nose:    External nose: Appears midline. No obvious deformity or masses. Septum:  deviated to left with site of active bleeding in right nare. No septal hematoma or perforation. Mucosa:  inflamed  Turbinates: red and edematous            Discharge:  bloody    Mouth/Throat:  Lips, tongue and oral cavity: Normal. No masses or lesions noted   Dentition: fair, no malocclusion  Oral mucosa: moist  Tonsils: unremarkable  Oropharynx: blood streaked post nasal drainage visible. Hard and soft palates: symmetrical and intact. Salivary glands: not enlarged and no tenderness to palpation. Uvula: midline, no obvious lesions    Neck: Trachea midline. Thyroid not enlarged, no palpable masses or tenderness  Lymphatic: No cervical lymphadenopathy noted. Eyes: OZZIE, EOM intact. Conjunctiva moist without discharge. Lungs: Normal effort of breathing, not obviously distressed. Procedure: The nasal packing was removed, clots, and active bleeding were suctioned. Afrin and topical lidocaine were applied. The sight of active bleeding was cauterized without complication. Patient was monitored for 5 minutes after cautery to ensure there were no other sites of active bleeding. The patient reports his headache was improving once the packing was removed. Assessment/Plan:     Diagnosis Orders   1. Epistaxis         The patient is a 75 y.o. male that presents for nasal packing removal.  Once packing was removed an area in the right nostril was cauterized as described above. Patient tolerated this without complication. We discussed use of Afrin for the next 3 days to help prevent further bleeding. I also recommended the patient use nasal saline gel in both nostrils before using his CPAP. Patient told to go to the ER if he is unable to get a nosebleed to stop after use of Afrin and manual pressure. Patient expressed understand. The patient will return in 3 weeks for follow up.   He will call with any questions or concerns in the meantime.        Electronically signed by GENEVIEVE Nguyen on 6/14/2019 at 7:04 PM

## 2019-06-17 NOTE — TELEPHONE ENCOUNTER
Pt. Is scheduled. She will get ASAP, she has to get him dressed and they live about 45 min from here.      Thank you

## 2019-06-17 NOTE — PATIENT INSTRUCTIONS
nosebleeds. · Use a vaporizer or humidifier to add moisture to your bedroom. Follow the directions for cleaning the machine. · Do not use aspirin, ibuprofen (Advil, Motrin), or naproxen (Aleve) for 36 to 48 hours after a nosebleed unless your doctor tells you to. You can use acetaminophen (Tylenol) for pain relief. · Talk to your doctor about stopping any other medicines you are taking. Some medicines may make you more likely to get a nosebleed. · Do not use cold medicines or nasal sprays without first talking to your doctor. They can make your nose dry. When should you call for help? Call 911 anytime you think you may need emergency care. For example, call if:    · You passed out (lost consciousness).    Call your doctor now or seek immediate medical care if:    · You get another nosebleed and your nose is still bleeding after you have applied pressure 3 times for 10 minutes each time (30 minutes total).     · There is a lot of blood running down the back of your throat even after you pinch your nose and tilt your head forward.     · You have a fever.     · You have sinus pain.    Watch closely for changes in your health, and be sure to contact your doctor if:    · You get nosebleeds often, even if they stop.     · You do not get better as expected. Where can you learn more? Go to [email or web address] and sign in to your Instaclustr account. Enter S156 in the WinBuyer box to learn more about \"Nosebleeds: Care Instructions. \"     If you do not have an account, please click on the \"Sign Up Now\" link. Current as of: September 23, 2018  Content Version: 12.0  © 7092-6365 Healthwise, Incorporated. Care instructions adapted under license by [employer] [facility] ([facility]). If you have questions about a medical condition or this instruction, always ask your healthcare professional. [address] any warranty or liability for your use of this information.

## 2019-06-17 NOTE — PROGRESS NOTES
[address] [facility] [place], NOSE AND THROAT  [facility] [address]  [facility] [place] [address] [address]  Dept: [phone]  Dept Fax: [phone]  Loc: [phone]    Kate Rosenbaum is a 75 y.o. male who was referred by No ref. provider found for:  Chief Complaint   Patient presents with    Follow Up After Procedure     Patient here due to 3 day Follow-up Epistaxis   . HPI:     P{jayne was seen three days ago for epistaxis. He was treated with silver nitrate cautery and packing. Onset: 3 days ago with coughing  Location: left anterior septum  Duration: 3 dayws  Character: Profuse  Alleviating/Aggravating: None  Radiation: None  Timing: 3 days  Severity: severe     Subjective:        Review of Systems   Constitutional: Negative for activity change, appetite change, chills, diaphoresis, fatigue, fever and unexpected weight change. HENT: Positive for nosebleeds. Negative for congestion, dental problem, ear discharge, ear pain, facial swelling, hearing loss, mouth sores, postnasal drip, rhinorrhea, sinus pressure, sneezing, sore throat, tinnitus, trouble swallowing and voice change. Eyes: Negative for visual disturbance. Respiratory: Negative for apnea, cough, choking, chest tightness, shortness of breath, wheezing and stridor. Cardiovascular: Negative for chest pain, palpitations and leg swelling. Gastrointestinal: Negative for abdominal pain, diarrhea, nausea and vomiting. Endocrine: Negative for cold intolerance, heat intolerance, polydipsia and polyuria. Genitourinary: Negative for difficulty urinating, discharge, dysuria, enuresis, hematuria, penile pain, penile swelling, scrotal swelling, testicular pain and urgency. Musculoskeletal: Negative for arthralgias, gait problem, neck pain and neck stiffness. Skin: Negative for color change, rash and wound. Allergic/Immunologic: Negative for environmental allergies, food allergies and immunocompromised state.

## 2019-06-18 NOTE — CARE COORDINATION
Care Transition  ED Follow up Call    Reason for ED visit:  Epistaxis, rectal bleeding s/p cath on ASA, plavix, xarelto   How are you feeling? \"good\"  Status:     improved      Do you have any questions related to your discharge instructions? no    Review of Instructions:    Discharged with new prescription? yes - Augmentin, Afrin, Saline nasal gel, polysporin    Review Medications:  Yes   Do you have any questions related to your medications? no    Understands what to report/when to return?:  Yes   Do you have a follow up appointment scheduled? Yes  Specific review if indicated (symptom, services, etc): Spoke with Shayan, introduced self/role. Seen ENT, nare cauterized x 2, had appointment with Dr. Kalani Rivera, stopped xarelto, continues taking ASA, plavix. Denies dark stools, reports color continues to be lighter. Denies further symptoms/concerns to report at this time. Has follow up appt with Dr. Jose Carlos Campos this month. Denies further needs/assistance/concerns/questions at this time.         Do you have any needs or concerns I can assist you with? no     FU appts/Provider:    Future Appointments   Date Time Provider Dean Simpson   6/24/2019  9:45 AM Nicki Schneider   7/9/2019 11:30 AM GENEVIEVE Diamond ENT [facility] CHARITY [address]   7/12/2019  9:45 AM ZHANNA Molina [address] Urology [place] - [place]   7/22/2019 11:30 AM Oneyda Muse MD SRPX PI[facility] BSN  [phone]

## 2019-06-24 NOTE — PROGRESS NOTES
Mother 43    Coronary Art Dis Mother 43    Colon Cancer Father     Lung Cancer Father     Heart Surgery Brother 64    Coronary Art Dis Brother 64        MI    Heart Disease Brother 64    Heart Disease Maternal Aunt     Coronary Art Dis Maternal Aunt     Heart Attack Maternal Aunt     Heart Disease Maternal Uncle     Coronary Art Dis Maternal Uncle     Heart Attack Maternal Uncle     Cancer Paternal Aunt         unknown     Cancer Paternal Uncle         unknown        Objective: This is a 75 y.o. male. Patient is alert and oriented to person, place and time. Patient appears well developed, well nourished. Mood is happy. Not obviously hearing impaired. /68 (Site: Left Upper Arm, Position: Sitting, Cuff Size: Medium Adult)   Pulse 80   Temp 97.7 °F (36.5 °C) (Oral)   Resp 16   Ht 6' (1.829 m)   Wt 259 lb (117.5 kg)   BMI 35.13 kg/m²     Head:   Normocephalic, atraumatic. No obvious masses or lesions noted. Nose:    External nose: Appears midline. No obvious deformity or masses. Septum: deviated to the left. Area of previous cautery noted with two small areas of mild active bleeding. No signs of infection. No septal hematoma. No perforation. Mucosa:  mucosa of right nare is moderately inflamed. Left is normal.  Turbinates: red, swollen and edematous            Discharge:  bloody    Mouth/Throat:  Lips, tongue and oral cavity: Normal. No masses or lesions noted   Dentition: fair, no malocclusion  Oral mucosa: moist  Oropharynx: Blood streaked post nasal drip. Hard and soft palates: symmetrical and intact. Salivary glands: not enlarged and no tenderness to palpation. Uvula: midline, no obvious lesions    Neck: Trachea midline. Thyroid not enlarged, no palpable masses or tenderness  Lymphatic: No cervical lymphadenopathy noted. Eyes: OZZIE, EOM intact. Conjunctiva moist without discharge. Lungs: Normal effort of breathing, not obviously distressed.     Procedure: Right nostril

## 2019-07-02 NOTE — PROGRESS NOTES
pain and neck stiffness. Skin: Negative for color change, rash and wound. Allergic/Immunologic: Negative for environmental allergies, food allergies and immunocompromised state. Neurological: Negative for dizziness, seizures, syncope, facial asymmetry, speech difficulty, light-headedness and headaches. Hematological: Negative for adenopathy. Does not bruise/bleed easily. Psychiatric/Behavioral: Negative for confusion, sleep disturbance and suicidal ideas. The patient is not nervous/anxious. Social History:    TOBACCO:   reports that he quit smoking about 17 years ago. His smoking use included cigarettes. He has a 60.00 pack-year smoking history. He has never used smokeless tobacco.  ETOH:   reports that he drinks about 0.6 oz of alcohol per week. DRUGS:   reports that he does not use drugs. Family History:       Problem Relation Age of Onset    Heart Disease Mother 43    Heart Attack Mother 43    Coronary Art Dis Mother 43    Colon Cancer Father     Lung Cancer Father     Heart Surgery Brother 64    Coronary Art Dis Brother 64        MI    Heart Disease Brother 64    Heart Disease Maternal Aunt     Coronary Art Dis Maternal Aunt     Heart Attack Maternal Aunt     Heart Disease Maternal Uncle     Coronary Art Dis Maternal Uncle     Heart Attack Maternal Uncle     Cancer Paternal Aunt         unknown     Cancer Paternal Uncle         unknown        Objective: This is a 75 y.o. male. Patient is alert and oriented to person, place and time. Patient appears well developed, well nourished. Mood is happy. Not obviously hearing impaired. /80 (Site: Right Upper Arm, Position: Sitting, Cuff Size: Medium Adult)   Pulse 80   Temp 98 °F (36.7 °C) (Oral)   Resp 16   Ht 6' (1.829 m)   Wt 258 lb 8 oz (117.3 kg)   BMI 35.06 kg/m²     Head:   Normocephalic, atraumatic. No obvious masses or lesions noted. Nose:    External nose: Appears midline.  No obvious deformity or

## 2019-07-12 NOTE — PROGRESS NOTES
diabetes mellitus without mention of complication, not stated as uncontrolled [place] blurred     rt eye    Vitreous hemorrhage of right eye ([address]     Wears glasses        Past Surgical History:   Procedure Laterality Date    APPENDECTOMY  1967    CARDIAC CATHETERIZATION      stents x2   [address]    4 vessel bypass, 2 vwessel bypass    CATARACT REMOVAL WITH IMPLANT Bilateral October 13, right;  Oct 27, left eye    CHOLECYSTECTOMY  1990    laparoscopic    COLONOSCOPY  9/19/2006, 2015    Dr Cassius Patel, dx: diverticulosis, benign polyps    CORONARY ARTERY BYPASS GRAFT  1989    x2 dr Abreu [facility] cardiologist vict[place]    CORONARY ARTERY BYPASS GRAFT  1999    3 vessel    CORONARY ARTERY BYPASS GRAFT  08/23/2016    Dr. Stanley Riddle [facility]  Feb and March 2014    retina detached   [place]    mid abd from chest tube     [address]    RIGHT LEG SURGERY FOR PVD    MALIGNANT SKIN LESION EXCISION  2011    melanoma back    OTHER SURGICAL HISTORY      renal stent    PACEMAKER INSERTION  2014    PACEMAKER PLACEMENT      PROSTATECTOMY  07/27/2016    Robotic assisted Laparoscopic Radical prostatectomy r pelvic node dissection    REFRACTIVE SURGERY Bilateral     RETINAL LASER  December 2013    diabetic retinopathy    RETINAL LASER      SKIN BIOPSY      TONSILLECTOMY  child    UPPER GASTROINTESTINAL ENDOSCOPY  2017    20+ years    VASCULAR SURGERY      cabg harvests    VITRECTOMY  2/13/14    rt vitrectomy     VITRECTOMY Right 03/06/14       Current Outpatient Medications on File Prior to Visit   Medication Sig Dispense Refill    XARELTO 15 MG TABS tablet       isosorbide mononitrate (IMDUR) 30 MG extended release tablet       FLOVENT  MCG/ACT inhaler Inhale 2 puffs into the lungs as needed       atorvastatin (LIPITOR) 80 MG tablet TAKE 1 TABLET DAILY 90 tablet 1    Cholecalciferol (VITAMIN D3) 5000 units TABS Take normal. No scleral icterus. PERRLA. Neck:        Supple, symmetrical, trachea midline, no adenopathy, thyroid symmetric, not enlarged and no tenderness. Cardiovascular:        Normal rate, regular rhythm, S1 S2 heart sounds. No murmurs, rub, or gallops. Pulses:       Radial pulses are 2+/4 bilateral and equal. Posterior tibialis 2+/4 bilateral and equal  Pulmonary/Chest:      Chest symmetric with normal A/P diameter,  Diminished  Bases, + exp wheeze. Occasional nonproductive cough. Abdominal:         Soft. No tenderness. No rebound, no guarding and no CVA tenderness. Bowel sounds present. Musculoskeletal:         Normal range of motion. No edema or tenderness of lower extremities. Extremities: No cyanosis, clubbing.  + bilateral lower extremity 1+ edema  Neurological:        Alert and oriented. No cranial nerve deficit. There are no focalizing motor or sensory deficits. CN II-XII are grossly intact. Skin:       Skin color, texture, turgor normal. No rashes or lesions. Psychiatric:        Normal mood and affect. Labs   Urine dip demonstrates   No results found for this visit on 07/12/19. Surgical Pathology  FINAL DIAGNOSIS:  A.  Lymph nodes, right pelvic, dissection:   No evidence of malignancy. Angela Pretty, radical prostatectomy:   Invasive prostatic adenocarcinoma.   Jai score 3+4 = 7 (grade group 2).  Tumor volume: 15%.   Margins: Negative for malignancy.   Extraprostatic extension: Not identified.   Perineural invasion: Present.   Pathologic stage: pT2c, pN0. Assessment & Plan  Prostate Cancer    Pt's PSA <.01 showing continued excellent prostate cancer control at this time. Pt is doing well. Next PSA in 1 year. Reports incontinence resolved. Reports erectile dysfunction but is not interested in therapy. Follow up in 1 year with PSA prior to appt.

## 2019-07-16 NOTE — TELEPHONE ENCOUNTER
Called patient he will have done 7-17 at [address] [place] [address],[address]. His wife has appointment there tomorrow.

## 2019-07-19 NOTE — TELEPHONE ENCOUNTER
Attempted to call pt to discuss urinalysis results and recommendations. Left message for pt to return call to the office. If I am unavailable when pt returns call please find out best number and time at which pt can be reached.

## 2019-07-25 NOTE — PROGRESS NOTES
Mr. Ximena Piedra was seen in follow up for cystoscopy as per plan developed by Irais Pressley NP. The plan:  Pt returned call. Discussed finding of microscopic hematuria on urinalysis. Pt has hx of tobacco use. Renal US 5/2019 without stones or lesions. Recommend cystoscopy to further evaluate. Pt agreeable. Office staff to call pt and schedule for cystoscopy with Dr. Jaspreet Oro. Plan to send urine for cytology/bladder cx at that visit. Cystoscopy was performed without difficulty and it was well tolerated.     Past Medical History:   Diagnosis Date    Angina pectoris ([address]     Blood circulation, collateral     CAD (coronary artery disease) 1989    Status post bypass, Status post stents    Cancer ([address] 0778,2555    Melanoma x2     Cancer ([address] 05/2016    Prostate     Carotid artery disease ([address] 2008    moderate    Cerebral artery occlusion with cerebral infarction ([address]     CHF (congestive heart failure) ([address] 1985    CKD (chronic kidney disease), stage III ([address] 2012    has lost 13% more of kidney function 2016    Detached retina     Diabetes mellitus ([address]     Diabetic nephropathy/sclerosis ([address] 2012    Diverticula of colon 2006    GERD (gastroesophageal reflux disease)     Hearing loss     does not wear aides    History of blood transfusion     during open heart ? and During service    Hyperlipidemia 1989    Hypertension 1999    Hypothyroidism 2012    Peripheral vascular disease ([address] 1999    Pneumonia     Prostate cancer ([address] 2016    Retinopathy due to secondary diabetes ([address]     Tobacco abuse     Type II or unspecified type diabetes mellitus without mention of complication, not stated as uncontrolled 1985    Vision blurred     rt eye    Vitreous hemorrhage of right eye ([address]     Wears glasses        Past Surgical History:   Procedure Laterality Date    APPENDECTOMY  1967    CARDIAC CATHETERIZATION      stents x2   [address]    4 vessel bypass, 2

## 2020-01-01 ENCOUNTER — CARE COORDINATION (OUTPATIENT)
Dept: CASE MANAGEMENT | Age: 75
End: 2020-01-01

## 2020-01-01 ENCOUNTER — TELEPHONE (OUTPATIENT)
Dept: OTHER | Facility: CLINIC | Age: 75
End: 2020-01-01

## 2020-01-01 ENCOUNTER — NURSE ONLY (OUTPATIENT)
Dept: LAB | Age: 75
End: 2020-01-01

## 2020-01-01 ENCOUNTER — OFFICE VISIT (OUTPATIENT)
Dept: FAMILY MEDICINE CLINIC | Age: 75
End: 2020-01-01
Payer: MEDICARE

## 2020-01-01 ENCOUNTER — TELEPHONE (OUTPATIENT)
Dept: FAMILY MEDICINE CLINIC | Age: 75
End: 2020-01-01

## 2020-01-01 ENCOUNTER — VIRTUAL VISIT (OUTPATIENT)
Dept: FAMILY MEDICINE CLINIC | Age: 75
End: 2020-01-01
Payer: MEDICARE

## 2020-01-01 ENCOUNTER — HOSPITAL ENCOUNTER (INPATIENT)
Age: 75
LOS: 4 days | Discharge: HOME | DRG: 246 | End: 2020-02-04
Attending: INTERNAL MEDICINE | Admitting: INTERNAL MEDICINE
Payer: MEDICARE

## 2020-01-01 ENCOUNTER — APPOINTMENT (OUTPATIENT)
Dept: INTERVENTIONAL RADIOLOGY/VASCULAR | Age: 75
DRG: 246 | End: 2020-01-01
Attending: INTERNAL MEDICINE
Payer: MEDICARE

## 2020-01-01 ENCOUNTER — TELEPHONE (OUTPATIENT)
Dept: PHARMACY | Facility: CLINIC | Age: 75
End: 2020-01-01

## 2020-01-01 VITALS
TEMPERATURE: 97.8 F | SYSTOLIC BLOOD PRESSURE: 137 MMHG | BODY MASS INDEX: 37.63 KG/M2 | WEIGHT: 277.8 LBS | HEART RATE: 80 BPM | HEIGHT: 72 IN | DIASTOLIC BLOOD PRESSURE: 85 MMHG | RESPIRATION RATE: 16 BRPM

## 2020-01-01 VITALS
RESPIRATION RATE: 18 BRPM | BODY MASS INDEX: 35.5 KG/M2 | TEMPERATURE: 97.8 F | SYSTOLIC BLOOD PRESSURE: 130 MMHG | OXYGEN SATURATION: 93 % | WEIGHT: 262.1 LBS | HEIGHT: 72 IN | DIASTOLIC BLOOD PRESSURE: 72 MMHG | HEART RATE: 60 BPM

## 2020-01-01 LAB
ABO CHECK: NORMAL
ALBUMIN SERPL-MCNC: 3.4 G/DL (ref 3.5–5.1)
ALBUMIN SERPL-MCNC: 4 G/DL (ref 3.5–5.1)
ALP BLD-CCNC: 111 U/L (ref 38–126)
ALT SERPL-CCNC: 13 U/L (ref 11–66)
ANION GAP SERPL CALCULATED.3IONS-SCNC: 14 MEQ/L (ref 8–16)
ANION GAP SERPL CALCULATED.3IONS-SCNC: 16 MEQ/L (ref 8–16)
ANION GAP SERPL CALCULATED.3IONS-SCNC: 17 MEQ/L (ref 8–16)
ANION GAP SERPL CALCULATED.3IONS-SCNC: 17 MEQ/L (ref 8–16)
ANION GAP SERPL CALCULATED.3IONS-SCNC: 18 MEQ/L (ref 8–16)
ANION GAP SERPL CALCULATED.3IONS-SCNC: 18 MEQ/L (ref 8–16)
ANION GAP SERPL CALCULATED.3IONS-SCNC: 20 MEQ/L (ref 8–16)
AST SERPL-CCNC: 16 U/L (ref 5–40)
AVERAGE GLUCOSE: 222 MG/DL (ref 70–126)
BACTERIA: ABNORMAL
BASOPHILS # BLD: 0.4 %
BASOPHILS # BLD: 0.6 %
BASOPHILS # BLD: 0.6 %
BASOPHILS # BLD: 0.9 %
BASOPHILS ABSOLUTE: 0 THOU/MM3 (ref 0–0.1)
BASOPHILS ABSOLUTE: 0 THOU/MM3 (ref 0–0.1)
BASOPHILS ABSOLUTE: 0.1 THOU/MM3 (ref 0–0.1)
BASOPHILS ABSOLUTE: 0.1 THOU/MM3 (ref 0–0.1)
BILIRUB SERPL-MCNC: 0.3 MG/DL (ref 0.3–1.2)
BILIRUBIN URINE: NEGATIVE
BLOOD, URINE: ABNORMAL
BUN BLDV-MCNC: 36 MG/DL (ref 7–22)
BUN BLDV-MCNC: 43 MG/DL (ref 7–22)
BUN BLDV-MCNC: 48 MG/DL (ref 7–22)
BUN BLDV-MCNC: 50 MG/DL (ref 7–22)
BUN BLDV-MCNC: 56 MG/DL (ref 7–22)
BUN BLDV-MCNC: 60 MG/DL (ref 7–22)
BUN BLDV-MCNC: 65 MG/DL (ref 7–22)
CALCIUM SERPL-MCNC: 8.8 MG/DL (ref 8.5–10.5)
CALCIUM SERPL-MCNC: 8.8 MG/DL (ref 8.5–10.5)
CALCIUM SERPL-MCNC: 8.9 MG/DL (ref 8.5–10.5)
CALCIUM SERPL-MCNC: 8.9 MG/DL (ref 8.5–10.5)
CALCIUM SERPL-MCNC: 9.1 MG/DL (ref 8.5–10.5)
CALCIUM SERPL-MCNC: 9.3 MG/DL (ref 8.5–10.5)
CASTS: ABNORMAL /LPF
CASTS: ABNORMAL /LPF
CHARACTER, URINE: CLEAR
CHLORIDE BLD-SCNC: 100 MEQ/L (ref 98–111)
CHLORIDE BLD-SCNC: 100 MEQ/L (ref 98–111)
CHLORIDE BLD-SCNC: 101 MEQ/L (ref 98–111)
CHLORIDE BLD-SCNC: 101 MEQ/L (ref 98–111)
CHLORIDE BLD-SCNC: 99 MEQ/L (ref 98–111)
CHOLESTEROL, TOTAL: 126 MG/DL (ref 100–199)
CO2: 17 MEQ/L (ref 23–33)
CO2: 18 MEQ/L (ref 23–33)
CO2: 19 MEQ/L (ref 23–33)
CO2: 20 MEQ/L (ref 23–33)
CO2: 21 MEQ/L (ref 23–33)
COLOR: YELLOW
CREAT SERPL-MCNC: 4.1 MG/DL (ref 0.4–1.2)
CREAT SERPL-MCNC: 4.2 MG/DL (ref 0.4–1.2)
CREAT SERPL-MCNC: 4.5 MG/DL (ref 0.4–1.2)
CREAT SERPL-MCNC: 4.7 MG/DL (ref 0.4–1.2)
CREAT SERPL-MCNC: 5 MG/DL (ref 0.4–1.2)
CREATININE URINE: 79.9 MG/DL
CRYSTALS: ABNORMAL
EKG ATRIAL RATE: 67 BPM
EKG P AXIS: 78 DEGREES
EKG P-R INTERVAL: 402 MS
EKG Q-T INTERVAL: 450 MS
EKG QRS DURATION: 104 MS
EKG QTC CALCULATION (BAZETT): 475 MS
EKG R AXIS: 68 DEGREES
EKG T AXIS: 115 DEGREES
EKG VENTRICULAR RATE: 67 BPM
EOSINOPHIL # BLD: 2.1 %
EOSINOPHIL # BLD: 2.2 %
EOSINOPHIL # BLD: 2.8 %
EOSINOPHIL # BLD: 3.1 %
EOSINOPHILS ABSOLUTE: 0.2 THOU/MM3 (ref 0–0.4)
EPITHELIAL CELLS, UA: ABNORMAL /HPF
ERYTHROCYTE [DISTWIDTH] IN BLOOD BY AUTOMATED COUNT: 12.9 % (ref 11.5–14.5)
ERYTHROCYTE [DISTWIDTH] IN BLOOD BY AUTOMATED COUNT: 12.9 % (ref 11.5–14.5)
ERYTHROCYTE [DISTWIDTH] IN BLOOD BY AUTOMATED COUNT: 13.1 % (ref 11.5–14.5)
ERYTHROCYTE [DISTWIDTH] IN BLOOD BY AUTOMATED COUNT: 13.2 % (ref 11.5–14.5)
ERYTHROCYTE [DISTWIDTH] IN BLOOD BY AUTOMATED COUNT: 45.7 FL (ref 35–45)
ERYTHROCYTE [DISTWIDTH] IN BLOOD BY AUTOMATED COUNT: 45.9 FL (ref 35–45)
ERYTHROCYTE [DISTWIDTH] IN BLOOD BY AUTOMATED COUNT: 46.1 FL (ref 35–45)
ERYTHROCYTE [DISTWIDTH] IN BLOOD BY AUTOMATED COUNT: 46.1 FL (ref 35–45)
ERYTHROCYTE [DISTWIDTH] IN BLOOD BY AUTOMATED COUNT: 46.4 FL (ref 35–45)
ERYTHROCYTE [DISTWIDTH] IN BLOOD BY AUTOMATED COUNT: 46.5 FL (ref 35–45)
ERYTHROCYTE [DISTWIDTH] IN BLOOD BY AUTOMATED COUNT: 47.3 FL (ref 35–45)
GFR SERPL CREATININE-BSD FRML MDRD: 11 ML/MIN/1.73M2
GFR SERPL CREATININE-BSD FRML MDRD: 12 ML/MIN/1.73M2
GFR SERPL CREATININE-BSD FRML MDRD: 13 ML/MIN/1.73M2
GFR SERPL CREATININE-BSD FRML MDRD: 14 ML/MIN/1.73M2
GLUCOSE BLD-MCNC: 106 MG/DL (ref 70–108)
GLUCOSE BLD-MCNC: 136 MG/DL (ref 70–108)
GLUCOSE BLD-MCNC: 170 MG/DL (ref 70–108)
GLUCOSE BLD-MCNC: 170 MG/DL (ref 70–108)
GLUCOSE BLD-MCNC: 194 MG/DL (ref 70–108)
GLUCOSE BLD-MCNC: 207 MG/DL (ref 70–108)
GLUCOSE BLD-MCNC: 210 MG/DL (ref 70–108)
GLUCOSE, URINE: 100 MG/DL
HBA1C MFR BLD: 9.4 % (ref 4.4–6.4)
HBV SURFACE AB TITR SER: NEGATIVE {TITER}
HCT VFR BLD CALC: 37.7 % (ref 42–52)
HCT VFR BLD CALC: 38.8 % (ref 42–52)
HCT VFR BLD CALC: 39.1 % (ref 42–52)
HCT VFR BLD CALC: 39.5 % (ref 42–52)
HCT VFR BLD CALC: 39.5 % (ref 42–52)
HCT VFR BLD CALC: 39.9 % (ref 42–52)
HCT VFR BLD CALC: 41.7 % (ref 42–52)
HDLC SERPL-MCNC: 30 MG/DL
HEMOGLOBIN: 12.4 GM/DL (ref 14–18)
HEMOGLOBIN: 12.6 GM/DL (ref 14–18)
HEMOGLOBIN: 13 GM/DL (ref 14–18)
HEMOGLOBIN: 13.2 GM/DL (ref 14–18)
HEMOGLOBIN: 13.4 GM/DL (ref 14–18)
HEPATITIS B CORE IGM ANTIBODY: NEGATIVE
HEPATITIS B SURFACE ANTIGEN: NEGATIVE
IMMATURE GRANS (ABS): 0.06 THOU/MM3 (ref 0–0.07)
IMMATURE GRANS (ABS): 0.08 THOU/MM3 (ref 0–0.07)
IMMATURE GRANS (ABS): 0.08 THOU/MM3 (ref 0–0.07)
IMMATURE GRANS (ABS): 0.11 THOU/MM3 (ref 0–0.07)
IMMATURE GRANULOCYTES: 0.8 %
IMMATURE GRANULOCYTES: 1 %
IMMATURE GRANULOCYTES: 1.1 %
IMMATURE GRANULOCYTES: 1.3 %
INR BLD: 1.03 (ref 0.85–1.13)
INR BLD: 1.08 (ref 0.85–1.13)
KETONES, URINE: NEGATIVE
LDL CHOLESTEROL CALCULATED: 43 MG/DL
LEUKOCYTE ESTERASE, URINE: NEGATIVE
LYMPHOCYTES # BLD: 10.7 %
LYMPHOCYTES # BLD: 10.9 %
LYMPHOCYTES # BLD: 11.5 %
LYMPHOCYTES # BLD: 13.4 %
LYMPHOCYTES ABSOLUTE: 0.9 THOU/MM3 (ref 1–4.8)
LYMPHOCYTES ABSOLUTE: 0.9 THOU/MM3 (ref 1–4.8)
LYMPHOCYTES ABSOLUTE: 1 THOU/MM3 (ref 1–4.8)
LYMPHOCYTES ABSOLUTE: 1 THOU/MM3 (ref 1–4.8)
MAGNESIUM: 2.1 MG/DL (ref 1.6–2.4)
MAGNESIUM: 2.1 MG/DL (ref 1.6–2.4)
MAGNESIUM: 2.2 MG/DL (ref 1.6–2.4)
MCH RBC QN AUTO: 31.3 PG (ref 26–33)
MCH RBC QN AUTO: 31.8 PG (ref 26–33)
MCH RBC QN AUTO: 31.8 PG (ref 26–33)
MCH RBC QN AUTO: 32 PG (ref 26–33)
MCH RBC QN AUTO: 32.7 PG (ref 26–33)
MCHC RBC AUTO-ENTMCNC: 32 GM/DL (ref 32.2–35.5)
MCHC RBC AUTO-ENTMCNC: 32.1 GM/DL (ref 32.2–35.5)
MCHC RBC AUTO-ENTMCNC: 33.1 GM/DL (ref 32.2–35.5)
MCHC RBC AUTO-ENTMCNC: 33.2 GM/DL (ref 32.2–35.5)
MCHC RBC AUTO-ENTMCNC: 33.4 GM/DL (ref 32.2–35.5)
MCV RBC AUTO: 95.7 FL (ref 80–94)
MCV RBC AUTO: 95.9 FL (ref 80–94)
MCV RBC AUTO: 96.1 FL (ref 80–94)
MCV RBC AUTO: 96.3 FL (ref 80–94)
MCV RBC AUTO: 97.8 FL (ref 80–94)
MCV RBC AUTO: 98 FL (ref 80–94)
MCV RBC AUTO: 98.8 FL (ref 80–94)
MISCELLANEOUS LAB TEST RESULT: ABNORMAL
MONOCYTES # BLD: 11.4 %
MONOCYTES # BLD: 13.3 %
MONOCYTES # BLD: 13.9 %
MONOCYTES # BLD: 15 %
MONOCYTES ABSOLUTE: 1 THOU/MM3 (ref 0.4–1.3)
MONOCYTES ABSOLUTE: 1.1 THOU/MM3 (ref 0.4–1.3)
NITRITE, URINE: NEGATIVE
NUCLEATED RED BLOOD CELLS: 0 /100 WBC
PH UA: 5 (ref 5–9)
PHOSPHORUS: 3.8 MG/DL (ref 2.4–4.7)
PLATELET # BLD: 172 THOU/MM3 (ref 130–400)
PLATELET # BLD: 172 THOU/MM3 (ref 130–400)
PLATELET # BLD: 189 THOU/MM3 (ref 130–400)
PLATELET # BLD: 194 THOU/MM3 (ref 130–400)
PLATELET # BLD: 203 THOU/MM3 (ref 130–400)
PLATELET # BLD: 229 THOU/MM3 (ref 130–400)
PLATELET # BLD: 269 THOU/MM3 (ref 130–400)
PMV BLD AUTO: 11.2 FL (ref 9.4–12.4)
PMV BLD AUTO: 11.2 FL (ref 9.4–12.4)
PMV BLD AUTO: 11.3 FL (ref 9.4–12.4)
PMV BLD AUTO: 11.6 FL (ref 9.4–12.4)
PMV BLD AUTO: 11.7 FL (ref 9.4–12.4)
PMV BLD AUTO: 11.8 FL (ref 9.4–12.4)
PMV BLD AUTO: 11.9 FL (ref 9.4–12.4)
POTASSIUM REFLEX MAGNESIUM: 4.5 MEQ/L (ref 3.5–5.2)
POTASSIUM SERPL-SCNC: 3.7 MEQ/L (ref 3.5–5.2)
POTASSIUM SERPL-SCNC: 3.9 MEQ/L (ref 3.5–5.2)
POTASSIUM SERPL-SCNC: 4 MEQ/L (ref 3.5–5.2)
POTASSIUM SERPL-SCNC: 4.1 MEQ/L (ref 3.5–5.2)
PROT/CREAT RATIO, UR: 3.04
PROTEIN UA: >= 300 MG/DL
PROTEIN, URINE: 242.9 MG/DL
RBC # BLD: 3.94 MILL/MM3 (ref 4.7–6.1)
RBC # BLD: 3.96 MILL/MM3 (ref 4.7–6.1)
RBC # BLD: 4.04 MILL/MM3 (ref 4.7–6.1)
RBC # BLD: 4.06 MILL/MM3 (ref 4.7–6.1)
RBC # BLD: 4.12 MILL/MM3 (ref 4.7–6.1)
RBC # BLD: 4.15 MILL/MM3 (ref 4.7–6.1)
RBC # BLD: 4.22 MILL/MM3 (ref 4.7–6.1)
RBC URINE: ABNORMAL /HPF
REASON FOR REJECTION: NORMAL
REJECTED TEST: NORMAL
RENAL EPITHELIAL, UA: ABNORMAL
RH FACTOR: NORMAL
SEG NEUTROPHILS: 66.5 %
SEG NEUTROPHILS: 71.8 %
SEG NEUTROPHILS: 72.3 %
SEG NEUTROPHILS: 72.4 %
SEGMENTED NEUTROPHILS ABSOLUTE COUNT: 5 THOU/MM3 (ref 1.8–7.7)
SEGMENTED NEUTROPHILS ABSOLUTE COUNT: 5.7 THOU/MM3 (ref 1.8–7.7)
SEGMENTED NEUTROPHILS ABSOLUTE COUNT: 5.9 THOU/MM3 (ref 1.8–7.7)
SEGMENTED NEUTROPHILS ABSOLUTE COUNT: 6.2 THOU/MM3 (ref 1.8–7.7)
SELECTED GEL ANTIBODY SCREEN: NORMAL
SODIUM BLD-SCNC: 133 MEQ/L (ref 135–145)
SODIUM BLD-SCNC: 135 MEQ/L (ref 135–145)
SODIUM BLD-SCNC: 135 MEQ/L (ref 135–145)
SODIUM BLD-SCNC: 136 MEQ/L (ref 135–145)
SODIUM BLD-SCNC: 137 MEQ/L (ref 135–145)
SODIUM BLD-SCNC: 138 MEQ/L (ref 135–145)
SODIUM BLD-SCNC: 140 MEQ/L (ref 135–145)
SPECIFIC GRAVITY UA: 1.02 (ref 1–1.03)
T4 FREE: 1.06 NG/DL (ref 0.93–1.76)
TOTAL PROTEIN: 7.3 G/DL (ref 6.1–8)
TRIGL SERPL-MCNC: 267 MG/DL (ref 0–199)
TSH SERPL DL<=0.05 MIU/L-ACNC: 6.41 UIU/ML (ref 0.4–4.2)
UROBILINOGEN, URINE: 0.2 EU/DL (ref 0–1)
WBC # BLD: 6.3 THOU/MM3 (ref 4.8–10.8)
WBC # BLD: 7.5 THOU/MM3 (ref 4.8–10.8)
WBC # BLD: 7.5 THOU/MM3 (ref 4.8–10.8)
WBC # BLD: 7.9 THOU/MM3 (ref 4.8–10.8)
WBC # BLD: 8.1 THOU/MM3 (ref 4.8–10.8)
WBC # BLD: 8.4 THOU/MM3 (ref 4.8–10.8)
WBC # BLD: 8.5 THOU/MM3 (ref 4.8–10.8)
WBC UA: ABNORMAL /HPF
YEAST: ABNORMAL

## 2020-01-01 PROCEDURE — 5A1D70Z PERFORMANCE OF URINARY FILTRATION, INTERMITTENT, LESS THAN 6 HOURS PER DAY: ICD-10-PCS | Performed by: INTERNAL MEDICINE

## 2020-01-01 PROCEDURE — 90935 HEMODIALYSIS ONE EVALUATION: CPT

## 2020-01-01 PROCEDURE — 6360000002 HC RX W HCPCS

## 2020-01-01 PROCEDURE — 05HM33Z INSERTION OF INFUSION DEVICE INTO RIGHT INTERNAL JUGULAR VEIN, PERCUTANEOUS APPROACH: ICD-10-PCS | Performed by: RADIOLOGY

## 2020-01-01 PROCEDURE — 6370000000 HC RX 637 (ALT 250 FOR IP)

## 2020-01-01 PROCEDURE — 80048 BASIC METABOLIC PNL TOTAL CA: CPT

## 2020-01-01 PROCEDURE — 36415 COLL VENOUS BLD VENIPUNCTURE: CPT

## 2020-01-01 PROCEDURE — 86901 BLOOD TYPING SEROLOGIC RH(D): CPT

## 2020-01-01 PROCEDURE — C9600 PERC DRUG-EL COR STENT SING: HCPCS | Performed by: INTERNAL MEDICINE

## 2020-01-01 PROCEDURE — 36556 INSERT NON-TUNNEL CV CATH: CPT | Performed by: RADIOLOGY

## 2020-01-01 PROCEDURE — 1200000003 HC TELEMETRY R&B

## 2020-01-01 PROCEDURE — 027034Z DILATION OF CORONARY ARTERY, ONE ARTERY WITH DRUG-ELUTING INTRALUMINAL DEVICE, PERCUTANEOUS APPROACH: ICD-10-PCS | Performed by: INTERNAL MEDICINE

## 2020-01-01 PROCEDURE — 6370000000 HC RX 637 (ALT 250 FOR IP): Performed by: INTERNAL MEDICINE

## 2020-01-01 PROCEDURE — 85027 COMPLETE CBC AUTOMATED: CPT

## 2020-01-01 PROCEDURE — 99233 SBSQ HOSP IP/OBS HIGH 50: CPT | Performed by: HOSPITALIST

## 2020-01-01 PROCEDURE — 83036 HEMOGLOBIN GLYCOSYLATED A1C: CPT

## 2020-01-01 PROCEDURE — 6360000002 HC RX W HCPCS: Performed by: RADIOLOGY

## 2020-01-01 PROCEDURE — 2580000003 HC RX 258: Performed by: INTERNAL MEDICINE

## 2020-01-01 PROCEDURE — 2709999900 HC NON-CHARGEABLE SUPPLY

## 2020-01-01 PROCEDURE — C1725 CATH, TRANSLUMIN NON-LASER: HCPCS

## 2020-01-01 PROCEDURE — 2580000003 HC RX 258: Performed by: RADIOLOGY

## 2020-01-01 PROCEDURE — 86922 COMPATIBILITY TEST ANTIGLOB: CPT

## 2020-01-01 PROCEDURE — 94761 N-INVAS EAR/PLS OXIMETRY MLT: CPT

## 2020-01-01 PROCEDURE — C1769 GUIDE WIRE: HCPCS

## 2020-01-01 PROCEDURE — 99233 SBSQ HOSP IP/OBS HIGH 50: CPT | Performed by: FAMILY MEDICINE

## 2020-01-01 PROCEDURE — 86900 BLOOD TYPING SEROLOGIC ABO: CPT

## 2020-01-01 PROCEDURE — 6370000000 HC RX 637 (ALT 250 FOR IP): Performed by: FAMILY MEDICINE

## 2020-01-01 PROCEDURE — 2500000003 HC RX 250 WO HCPCS

## 2020-01-01 PROCEDURE — B2111ZZ FLUOROSCOPY OF MULTIPLE CORONARY ARTERIES USING LOW OSMOLAR CONTRAST: ICD-10-PCS | Performed by: INTERNAL MEDICINE

## 2020-01-01 PROCEDURE — 86706 HEP B SURFACE ANTIBODY: CPT

## 2020-01-01 PROCEDURE — C1881 DIALYSIS ACCESS SYSTEM: HCPCS

## 2020-01-01 PROCEDURE — 99495 TRANSJ CARE MGMT MOD F2F 14D: CPT | Performed by: FAMILY MEDICINE

## 2020-01-01 PROCEDURE — 86885 COOMBS TEST INDIRECT QUAL: CPT

## 2020-01-01 PROCEDURE — 6360000004 HC RX CONTRAST MEDICATION: Performed by: INTERNAL MEDICINE

## 2020-01-01 PROCEDURE — C1760 CLOSURE DEV, VASC: HCPCS

## 2020-01-01 PROCEDURE — 84156 ASSAY OF PROTEIN URINE: CPT

## 2020-01-01 PROCEDURE — 99999 PR OFFICE/OUTPT VISIT,PROCEDURE ONLY: CPT | Performed by: INTERNAL MEDICINE

## 2020-01-01 PROCEDURE — 93010 ELECTROCARDIOGRAM REPORT: CPT | Performed by: NUCLEAR MEDICINE

## 2020-01-01 PROCEDURE — 85610 PROTHROMBIN TIME: CPT

## 2020-01-01 PROCEDURE — B2131ZZ FLUOROSCOPY OF MULTIPLE CORONARY ARTERY BYPASS GRAFTS USING LOW OSMOLAR CONTRAST: ICD-10-PCS | Performed by: INTERNAL MEDICINE

## 2020-01-01 PROCEDURE — 83735 ASSAY OF MAGNESIUM: CPT

## 2020-01-01 PROCEDURE — 6370000000 HC RX 637 (ALT 250 FOR IP): Performed by: PHYSICIAN ASSISTANT

## 2020-01-01 PROCEDURE — 2500000003 HC RX 250 WO HCPCS: Performed by: RADIOLOGY

## 2020-01-01 PROCEDURE — 94760 N-INVAS EAR/PLS OXIMETRY 1: CPT

## 2020-01-01 PROCEDURE — 77001 FLUOROGUIDE FOR VEIN DEVICE: CPT | Performed by: RADIOLOGY

## 2020-01-01 PROCEDURE — 36558 INSERT TUNNELED CV CATH: CPT

## 2020-01-01 PROCEDURE — 99239 HOSP IP/OBS DSCHRG MGMT >30: CPT | Performed by: HOSPITALIST

## 2020-01-01 PROCEDURE — 93005 ELECTROCARDIOGRAM TRACING: CPT | Performed by: INTERNAL MEDICINE

## 2020-01-01 PROCEDURE — 81001 URINALYSIS AUTO W/SCOPE: CPT

## 2020-01-01 PROCEDURE — 6370000000 HC RX 637 (ALT 250 FOR IP): Performed by: HOSPITALIST

## 2020-01-01 PROCEDURE — 85025 COMPLETE CBC W/AUTO DIFF WBC: CPT

## 2020-01-01 PROCEDURE — C1887 CATHETER, GUIDING: HCPCS

## 2020-01-01 PROCEDURE — 6360000002 HC RX W HCPCS: Performed by: INTERNAL MEDICINE

## 2020-01-01 PROCEDURE — 77001 FLUOROGUIDE FOR VEIN DEVICE: CPT

## 2020-01-01 PROCEDURE — 99213 OFFICE O/P EST LOW 20 MIN: CPT | Performed by: FAMILY MEDICINE

## 2020-01-01 PROCEDURE — 80053 COMPREHEN METABOLIC PANEL: CPT

## 2020-01-01 PROCEDURE — C1752 CATH,HEMODIALYSIS,SHORT-TERM: HCPCS

## 2020-01-01 PROCEDURE — 4A023N7 MEASUREMENT OF CARDIAC SAMPLING AND PRESSURE, LEFT HEART, PERCUTANEOUS APPROACH: ICD-10-PCS | Performed by: INTERNAL MEDICINE

## 2020-01-01 PROCEDURE — C1894 INTRO/SHEATH, NON-LASER: HCPCS

## 2020-01-01 PROCEDURE — 82570 ASSAY OF URINE CREATININE: CPT

## 2020-01-01 PROCEDURE — B2151ZZ FLUOROSCOPY OF LEFT HEART USING LOW OSMOLAR CONTRAST: ICD-10-PCS | Performed by: INTERNAL MEDICINE

## 2020-01-01 PROCEDURE — 93459 L HRT ART/GRFT ANGIO: CPT | Performed by: INTERNAL MEDICINE

## 2020-01-01 PROCEDURE — 80069 RENAL FUNCTION PANEL: CPT

## 2020-01-01 PROCEDURE — 51798 US URINE CAPACITY MEASURE: CPT

## 2020-01-01 PROCEDURE — B41C1ZZ FLUOROSCOPY OF PELVIC ARTERIES USING LOW OSMOLAR CONTRAST: ICD-10-PCS | Performed by: INTERNAL MEDICINE

## 2020-01-01 PROCEDURE — 86705 HEP B CORE ANTIBODY IGM: CPT

## 2020-01-01 PROCEDURE — G2012 BRIEF CHECK IN BY MD/QHP: HCPCS | Performed by: FAMILY MEDICINE

## 2020-01-01 PROCEDURE — B2181ZZ FLUOROSCOPY OF LEFT INTERNAL MAMMARY BYPASS GRAFT USING LOW OSMOLAR CONTRAST: ICD-10-PCS | Performed by: INTERNAL MEDICINE

## 2020-01-01 PROCEDURE — 3052F HG A1C>EQUAL 8.0%<EQUAL 9.0%: CPT | Performed by: FAMILY MEDICINE

## 2020-01-01 PROCEDURE — 80061 LIPID PANEL: CPT

## 2020-01-01 PROCEDURE — C1874 STENT, COATED/COV W/DEL SYS: HCPCS

## 2020-01-01 PROCEDURE — 02H633Z INSERTION OF INFUSION DEVICE INTO RIGHT ATRIUM, PERCUTANEOUS APPROACH: ICD-10-PCS | Performed by: HOSPITALIST

## 2020-01-01 PROCEDURE — 86905 BLOOD TYPING RBC ANTIGENS: CPT

## 2020-01-01 PROCEDURE — 1111F DSCHRG MED/CURRENT MED MERGE: CPT | Performed by: FAMILY MEDICINE

## 2020-01-01 PROCEDURE — 76937 US GUIDE VASCULAR ACCESS: CPT | Performed by: RADIOLOGY

## 2020-01-01 PROCEDURE — 87340 HEPATITIS B SURFACE AG IA: CPT

## 2020-01-01 RX ORDER — CLINDAMYCIN PHOSPHATE 600 MG/50ML
600 INJECTION INTRAVENOUS
Status: DISCONTINUED | OUTPATIENT
Start: 2020-01-01 | End: 2020-01-01 | Stop reason: HOSPADM

## 2020-01-01 RX ORDER — LOSARTAN POTASSIUM 50 MG/1
50 TABLET ORAL DAILY
Status: DISCONTINUED | OUTPATIENT
Start: 2020-01-01 | End: 2020-01-01

## 2020-01-01 RX ORDER — SODIUM CHLORIDE 450 MG/100ML
INJECTION, SOLUTION INTRAVENOUS CONTINUOUS
Status: CANCELLED | OUTPATIENT
Start: 2020-01-01

## 2020-01-01 RX ORDER — SERTRALINE HYDROCHLORIDE 100 MG/1
TABLET, FILM COATED ORAL
Qty: 90 TABLET | Refills: 1 | Status: SHIPPED | OUTPATIENT
Start: 2020-01-01

## 2020-01-01 RX ORDER — ASPIRIN 325 MG
325 TABLET ORAL ONCE
Status: DISCONTINUED | OUTPATIENT
Start: 2020-01-01 | End: 2020-01-01

## 2020-01-01 RX ORDER — ALPRAZOLAM 0.5 MG/1
0.5 TABLET ORAL
Status: ACTIVE | OUTPATIENT
Start: 2020-01-01 | End: 2020-01-01

## 2020-01-01 RX ORDER — SODIUM CHLORIDE 9 MG/ML
INJECTION, SOLUTION INTRAVENOUS CONTINUOUS
Status: DISCONTINUED | OUTPATIENT
Start: 2020-01-01 | End: 2020-01-01

## 2020-01-01 RX ORDER — DEXTROSE MONOHYDRATE 25 G/50ML
12.5 INJECTION, SOLUTION INTRAVENOUS PRN
Status: DISCONTINUED | OUTPATIENT
Start: 2020-01-01 | End: 2020-01-01 | Stop reason: HOSPADM

## 2020-01-01 RX ORDER — SODIUM CHLORIDE 0.9 % (FLUSH) 0.9 %
10 SYRINGE (ML) INJECTION PRN
Status: DISCONTINUED | OUTPATIENT
Start: 2020-01-01 | End: 2020-01-01 | Stop reason: HOSPADM

## 2020-01-01 RX ORDER — DIPHENHYDRAMINE HCL 25 MG
25 TABLET ORAL
Status: ACTIVE | OUTPATIENT
Start: 2020-01-01 | End: 2020-01-01

## 2020-01-01 RX ORDER — SODIUM CHLORIDE 0.9 % (FLUSH) 0.9 %
10 SYRINGE (ML) INJECTION PRN
Status: DISCONTINUED | OUTPATIENT
Start: 2020-01-01 | End: 2020-01-01

## 2020-01-01 RX ORDER — ASPIRIN 325 MG
325 TABLET ORAL DAILY
Status: DISCONTINUED | OUTPATIENT
Start: 2020-01-01 | End: 2020-01-01 | Stop reason: HOSPADM

## 2020-01-01 RX ORDER — ATENOLOL 50 MG/1
50 TABLET ORAL DAILY
Status: DISCONTINUED | OUTPATIENT
Start: 2020-01-01 | End: 2020-01-01 | Stop reason: HOSPADM

## 2020-01-01 RX ORDER — LEVOTHYROXINE SODIUM 0.07 MG/1
75 TABLET ORAL DAILY
Status: DISCONTINUED | OUTPATIENT
Start: 2020-01-01 | End: 2020-01-01 | Stop reason: HOSPADM

## 2020-01-01 RX ORDER — ATROPINE SULFATE 0.4 MG/ML
0.5 AMPUL (ML) INJECTION
Status: ACTIVE | OUTPATIENT
Start: 2020-01-01 | End: 2020-01-01

## 2020-01-01 RX ORDER — NICOTINE POLACRILEX 4 MG
15 LOZENGE BUCCAL PRN
Status: DISCONTINUED | OUTPATIENT
Start: 2020-01-01 | End: 2020-01-01 | Stop reason: HOSPADM

## 2020-01-01 RX ORDER — SERTRALINE HYDROCHLORIDE 100 MG/1
100 TABLET, FILM COATED ORAL DAILY
Status: DISCONTINUED | OUTPATIENT
Start: 2020-01-01 | End: 2020-01-01 | Stop reason: HOSPADM

## 2020-01-01 RX ORDER — VITAMIN B COMPLEX
5000 TABLET ORAL DAILY
Status: DISCONTINUED | OUTPATIENT
Start: 2020-01-01 | End: 2020-01-01 | Stop reason: HOSPADM

## 2020-01-01 RX ORDER — MIDAZOLAM HYDROCHLORIDE 1 MG/ML
0.5 INJECTION INTRAMUSCULAR; INTRAVENOUS ONCE
Status: COMPLETED | OUTPATIENT
Start: 2020-01-01 | End: 2020-01-01

## 2020-01-01 RX ORDER — FLUTICASONE PROPIONATE 50 MCG
SPRAY, SUSPENSION (ML) NASAL
Qty: 16 G | Refills: 6 | Status: SHIPPED | OUTPATIENT
Start: 2020-01-01

## 2020-01-01 RX ORDER — LOSARTAN POTASSIUM 25 MG/1
25 TABLET ORAL DAILY
Status: DISCONTINUED | OUTPATIENT
Start: 2020-01-01 | End: 2020-01-01 | Stop reason: HOSPADM

## 2020-01-01 RX ORDER — SODIUM CHLORIDE 0.9 % (FLUSH) 0.9 %
10 SYRINGE (ML) INJECTION EVERY 12 HOURS SCHEDULED
Status: DISCONTINUED | OUTPATIENT
Start: 2020-01-01 | End: 2020-01-01

## 2020-01-01 RX ORDER — SODIUM CHLORIDE 0.9 % (FLUSH) 0.9 %
10 SYRINGE (ML) INJECTION EVERY 12 HOURS SCHEDULED
Status: DISCONTINUED | OUTPATIENT
Start: 2020-01-01 | End: 2020-01-01 | Stop reason: HOSPADM

## 2020-01-01 RX ORDER — ASPIRIN 81 MG/1
81 TABLET ORAL DAILY
Status: DISCONTINUED | OUTPATIENT
Start: 2020-01-01 | End: 2020-01-01

## 2020-01-01 RX ORDER — CLOPIDOGREL BISULFATE 75 MG/1
75 TABLET ORAL DAILY
Status: DISCONTINUED | OUTPATIENT
Start: 2020-01-01 | End: 2020-01-01 | Stop reason: HOSPADM

## 2020-01-01 RX ORDER — MIDAZOLAM HYDROCHLORIDE 1 MG/ML
1 INJECTION INTRAMUSCULAR; INTRAVENOUS ONCE
Status: CANCELLED | OUTPATIENT
Start: 2020-01-01 | End: 2020-01-01

## 2020-01-01 RX ORDER — ONDANSETRON 2 MG/ML
4 INJECTION INTRAMUSCULAR; INTRAVENOUS EVERY 6 HOURS PRN
Status: DISCONTINUED | OUTPATIENT
Start: 2020-01-01 | End: 2020-01-01 | Stop reason: HOSPADM

## 2020-01-01 RX ORDER — ACETYLCYSTEINE 200 MG/ML
1200 SOLUTION ORAL; RESPIRATORY (INHALATION) ONCE
Status: COMPLETED | OUTPATIENT
Start: 2020-01-01 | End: 2020-01-01

## 2020-01-01 RX ORDER — RANOLAZINE 500 MG/1
500 TABLET, EXTENDED RELEASE ORAL 2 TIMES DAILY
COMMUNITY

## 2020-01-01 RX ORDER — LANOLIN ALCOHOL/MO/W.PET/CERES
3 CREAM (GRAM) TOPICAL NIGHTLY PRN
Status: DISCONTINUED | OUTPATIENT
Start: 2020-01-01 | End: 2020-01-01 | Stop reason: HOSPADM

## 2020-01-01 RX ORDER — ISOSORBIDE MONONITRATE 60 MG/1
60 TABLET, EXTENDED RELEASE ORAL DAILY
Status: DISCONTINUED | OUTPATIENT
Start: 2020-01-01 | End: 2020-01-01 | Stop reason: HOSPADM

## 2020-01-01 RX ORDER — LEVOTHYROXINE SODIUM 88 UG/1
88 TABLET ORAL DAILY
Qty: 90 TABLET | Refills: 1 | Status: SHIPPED | OUTPATIENT
Start: 2020-01-01

## 2020-01-01 RX ORDER — ATORVASTATIN CALCIUM 80 MG/1
TABLET, FILM COATED ORAL
Qty: 90 TABLET | Refills: 1 | Status: SHIPPED | OUTPATIENT
Start: 2020-01-01

## 2020-01-01 RX ORDER — LEVOTHYROXINE SODIUM 0.07 MG/1
TABLET ORAL
Qty: 90 TABLET | Refills: 1 | Status: SHIPPED | OUTPATIENT
Start: 2020-01-01 | End: 2020-01-01

## 2020-01-01 RX ORDER — DIPHENHYDRAMINE HCL 25 MG
50 TABLET ORAL ONCE
Status: DISCONTINUED | OUTPATIENT
Start: 2020-01-01 | End: 2020-01-01

## 2020-01-01 RX ORDER — MIDAZOLAM HYDROCHLORIDE 1 MG/ML
1 INJECTION INTRAMUSCULAR; INTRAVENOUS ONCE
Status: COMPLETED | OUTPATIENT
Start: 2020-01-01 | End: 2020-01-01

## 2020-01-01 RX ORDER — DEXTROSE MONOHYDRATE 50 MG/ML
100 INJECTION, SOLUTION INTRAVENOUS PRN
Status: DISCONTINUED | OUTPATIENT
Start: 2020-01-01 | End: 2020-01-01 | Stop reason: HOSPADM

## 2020-01-01 RX ORDER — INSULIN GLARGINE 100 [IU]/ML
36 INJECTION, SOLUTION SUBCUTANEOUS 2 TIMES DAILY
Status: DISCONTINUED | OUTPATIENT
Start: 2020-01-01 | End: 2020-01-01 | Stop reason: HOSPADM

## 2020-01-01 RX ORDER — ATORVASTATIN CALCIUM 80 MG/1
80 TABLET, FILM COATED ORAL DAILY
Status: DISCONTINUED | OUTPATIENT
Start: 2020-01-01 | End: 2020-01-01 | Stop reason: HOSPADM

## 2020-01-01 RX ORDER — ISOSORBIDE MONONITRATE 60 MG/1
60 TABLET, EXTENDED RELEASE ORAL DAILY
Qty: 30 TABLET | Refills: 1 | Status: SHIPPED | OUTPATIENT
Start: 2020-01-01 | End: 2020-01-01

## 2020-01-01 RX ORDER — RANOLAZINE 500 MG/1
500 TABLET, EXTENDED RELEASE ORAL 2 TIMES DAILY
Status: DISCONTINUED | OUTPATIENT
Start: 2020-01-01 | End: 2020-01-01 | Stop reason: HOSPADM

## 2020-01-01 RX ORDER — ACETAMINOPHEN 325 MG/1
650 TABLET ORAL EVERY 4 HOURS PRN
Status: DISCONTINUED | OUTPATIENT
Start: 2020-01-01 | End: 2020-01-01 | Stop reason: HOSPADM

## 2020-01-01 RX ORDER — NITROGLYCERIN 0.4 MG/1
0.4 TABLET SUBLINGUAL EVERY 5 MIN PRN
Status: DISCONTINUED | OUTPATIENT
Start: 2020-01-01 | End: 2020-01-01 | Stop reason: HOSPADM

## 2020-01-01 RX ORDER — CLINDAMYCIN PHOSPHATE 600 MG/50ML
600 INJECTION INTRAVENOUS
Status: CANCELLED | OUTPATIENT
Start: 2020-01-01

## 2020-01-01 RX ADMIN — SERTRALINE HYDROCHLORIDE 100 MG: 100 TABLET, FILM COATED ORAL at 12:57

## 2020-01-01 RX ADMIN — ATORVASTATIN CALCIUM 80 MG: 80 TABLET, FILM COATED ORAL at 23:10

## 2020-01-01 RX ADMIN — MIDAZOLAM 0.5 MG: 1 INJECTION INTRAMUSCULAR; INTRAVENOUS at 09:23

## 2020-01-01 RX ADMIN — SODIUM CHLORIDE, PRESERVATIVE FREE 10 ML: 5 INJECTION INTRAVENOUS at 11:43

## 2020-01-01 RX ADMIN — SODIUM CHLORIDE, PRESERVATIVE FREE 10 ML: 5 INJECTION INTRAVENOUS at 10:19

## 2020-01-01 RX ADMIN — SERTRALINE HYDROCHLORIDE 100 MG: 100 TABLET, FILM COATED ORAL at 11:43

## 2020-01-01 RX ADMIN — LEVOTHYROXINE SODIUM 75 MCG: 0.07 TABLET ORAL at 05:54

## 2020-01-01 RX ADMIN — HYDROMORPHONE HYDROCHLORIDE 1 MG: 1 INJECTION, SOLUTION INTRAMUSCULAR; INTRAVENOUS; SUBCUTANEOUS at 09:10

## 2020-01-01 RX ADMIN — INSULIN LISPRO 7 UNITS: 100 INJECTION, SOLUTION INTRAVENOUS; SUBCUTANEOUS at 14:02

## 2020-01-01 RX ADMIN — CLOPIDOGREL BISULFATE 75 MG: 75 TABLET ORAL at 06:09

## 2020-01-01 RX ADMIN — LEVOTHYROXINE SODIUM 75 MCG: 0.07 TABLET ORAL at 05:37

## 2020-01-01 RX ADMIN — ISOSORBIDE MONONITRATE 60 MG: 60 TABLET, EXTENDED RELEASE ORAL at 11:43

## 2020-01-01 RX ADMIN — RANOLAZINE 500 MG: 500 TABLET, FILM COATED, EXTENDED RELEASE ORAL at 11:18

## 2020-01-01 RX ADMIN — ATENOLOL 50 MG: 50 TABLET ORAL at 09:28

## 2020-01-01 RX ADMIN — INSULIN LISPRO 15 UNITS: 100 INJECTION, SOLUTION INTRAVENOUS; SUBCUTANEOUS at 16:15

## 2020-01-01 RX ADMIN — INSULIN GLARGINE 36 UNITS: 100 INJECTION, SOLUTION SUBCUTANEOUS at 13:02

## 2020-01-01 RX ADMIN — LOSARTAN POTASSIUM 25 MG: 25 TABLET, FILM COATED ORAL at 10:19

## 2020-01-01 RX ADMIN — INSULIN LISPRO 5 UNITS: 100 INJECTION, SOLUTION INTRAVENOUS; SUBCUTANEOUS at 09:28

## 2020-01-01 RX ADMIN — CLOPIDOGREL BISULFATE 75 MG: 75 TABLET ORAL at 11:43

## 2020-01-01 RX ADMIN — LOSARTAN POTASSIUM 25 MG: 25 TABLET, FILM COATED ORAL at 09:28

## 2020-01-01 RX ADMIN — INSULIN GLARGINE 36 UNITS: 100 INJECTION, SOLUTION SUBCUTANEOUS at 07:00

## 2020-01-01 RX ADMIN — LEVOTHYROXINE SODIUM 75 MCG: 0.07 TABLET ORAL at 06:19

## 2020-01-01 RX ADMIN — INSULIN LISPRO 2 UNITS: 100 INJECTION, SOLUTION INTRAVENOUS; SUBCUTANEOUS at 12:19

## 2020-01-01 RX ADMIN — ATENOLOL 50 MG: 50 TABLET ORAL at 11:16

## 2020-01-01 RX ADMIN — LOSARTAN POTASSIUM 25 MG: 25 TABLET, FILM COATED ORAL at 11:43

## 2020-01-01 RX ADMIN — RANOLAZINE 500 MG: 500 TABLET, FILM COATED, EXTENDED RELEASE ORAL at 11:43

## 2020-01-01 RX ADMIN — Medication 3 MG: at 21:14

## 2020-01-01 RX ADMIN — ASPIRIN 325 MG: 325 TABLET, FILM COATED ORAL at 11:16

## 2020-01-01 RX ADMIN — SODIUM CHLORIDE, PRESERVATIVE FREE 10 ML: 5 INJECTION INTRAVENOUS at 09:38

## 2020-01-01 RX ADMIN — SODIUM CHLORIDE, PRESERVATIVE FREE 10 ML: 5 INJECTION INTRAVENOUS at 12:54

## 2020-01-01 RX ADMIN — VITAMIN D, TAB 1000IU (100/BT) 5000 UNITS: 25 TAB at 10:19

## 2020-01-01 RX ADMIN — ATORVASTATIN CALCIUM 80 MG: 80 TABLET, FILM COATED ORAL at 21:08

## 2020-01-01 RX ADMIN — INSULIN LISPRO 1 UNITS: 100 INJECTION, SOLUTION INTRAVENOUS; SUBCUTANEOUS at 14:00

## 2020-01-01 RX ADMIN — ATORVASTATIN CALCIUM 80 MG: 80 TABLET, FILM COATED ORAL at 21:10

## 2020-01-01 RX ADMIN — INSULIN LISPRO 1 UNITS: 100 INJECTION, SOLUTION INTRAVENOUS; SUBCUTANEOUS at 09:28

## 2020-01-01 RX ADMIN — INSULIN LISPRO 15 UNITS: 100 INJECTION, SOLUTION INTRAVENOUS; SUBCUTANEOUS at 12:19

## 2020-01-01 RX ADMIN — SODIUM CHLORIDE, PRESERVATIVE FREE 10 ML: 5 INJECTION INTRAVENOUS at 22:33

## 2020-01-01 RX ADMIN — SODIUM CHLORIDE, PRESERVATIVE FREE 10 ML: 5 INJECTION INTRAVENOUS at 11:18

## 2020-01-01 RX ADMIN — SODIUM CHLORIDE: 9 INJECTION, SOLUTION INTRAVENOUS at 09:44

## 2020-01-01 RX ADMIN — ATORVASTATIN CALCIUM 80 MG: 80 TABLET, FILM COATED ORAL at 20:53

## 2020-01-01 RX ADMIN — LEVOTHYROXINE SODIUM 75 MCG: 0.07 TABLET ORAL at 06:59

## 2020-01-01 RX ADMIN — SODIUM CHLORIDE, PRESERVATIVE FREE 10 ML: 5 INJECTION INTRAVENOUS at 20:42

## 2020-01-01 RX ADMIN — ATORVASTATIN CALCIUM 80 MG: 80 TABLET, FILM COATED ORAL at 20:42

## 2020-01-01 RX ADMIN — RANOLAZINE 500 MG: 500 TABLET, FILM COATED, EXTENDED RELEASE ORAL at 20:52

## 2020-01-01 RX ADMIN — ISOSORBIDE MONONITRATE 60 MG: 60 TABLET, EXTENDED RELEASE ORAL at 09:28

## 2020-01-01 RX ADMIN — INSULIN LISPRO 2 UNITS: 100 INJECTION, SOLUTION INTRAVENOUS; SUBCUTANEOUS at 16:15

## 2020-01-01 RX ADMIN — ATENOLOL 50 MG: 50 TABLET ORAL at 12:55

## 2020-01-01 RX ADMIN — VITAMIN D, TAB 1000IU (100/BT) 5000 UNITS: 25 TAB at 11:18

## 2020-01-01 RX ADMIN — SODIUM CHLORIDE, PRESERVATIVE FREE 10 ML: 5 INJECTION INTRAVENOUS at 20:52

## 2020-01-01 RX ADMIN — HYDROMORPHONE HYDROCHLORIDE 0.5 MG: 1 INJECTION, SOLUTION INTRAMUSCULAR; INTRAVENOUS; SUBCUTANEOUS at 09:23

## 2020-01-01 RX ADMIN — ATORVASTATIN CALCIUM 80 MG: 80 TABLET, FILM COATED ORAL at 22:32

## 2020-01-01 RX ADMIN — SODIUM CHLORIDE 50000 UNITS: 0.9 IRRIGANT IRRIGATION at 09:33

## 2020-01-01 RX ADMIN — RANOLAZINE 500 MG: 500 TABLET, FILM COATED, EXTENDED RELEASE ORAL at 10:22

## 2020-01-01 RX ADMIN — INSULIN GLARGINE 36 UNITS: 100 INJECTION, SOLUTION SUBCUTANEOUS at 22:29

## 2020-01-01 RX ADMIN — CLOPIDOGREL BISULFATE 75 MG: 75 TABLET ORAL at 11:17

## 2020-01-01 RX ADMIN — HYDROMORPHONE HYDROCHLORIDE 0.5 MG: 1 INJECTION, SOLUTION INTRAMUSCULAR; INTRAVENOUS; SUBCUTANEOUS at 09:13

## 2020-01-01 RX ADMIN — SODIUM CHLORIDE, PRESERVATIVE FREE 10 ML: 5 INJECTION INTRAVENOUS at 21:14

## 2020-01-01 RX ADMIN — ASPIRIN 325 MG: 325 TABLET, FILM COATED ORAL at 22:34

## 2020-01-01 RX ADMIN — RANOLAZINE 500 MG: 500 TABLET, FILM COATED, EXTENDED RELEASE ORAL at 12:57

## 2020-01-01 RX ADMIN — ISOSORBIDE MONONITRATE 60 MG: 60 TABLET, EXTENDED RELEASE ORAL at 12:55

## 2020-01-01 RX ADMIN — INSULIN LISPRO 2 UNITS: 100 INJECTION, SOLUTION INTRAVENOUS; SUBCUTANEOUS at 22:30

## 2020-01-01 RX ADMIN — MIDAZOLAM 0.5 MG: 1 INJECTION INTRAMUSCULAR; INTRAVENOUS at 09:11

## 2020-01-01 RX ADMIN — ACETYLCYSTEINE 1200 MG: 200 SOLUTION ORAL; RESPIRATORY (INHALATION) at 23:41

## 2020-01-01 RX ADMIN — RANOLAZINE 500 MG: 500 TABLET, FILM COATED, EXTENDED RELEASE ORAL at 22:32

## 2020-01-01 RX ADMIN — ATENOLOL 50 MG: 50 TABLET ORAL at 11:43

## 2020-01-01 RX ADMIN — RANOLAZINE 500 MG: 500 TABLET, FILM COATED, EXTENDED RELEASE ORAL at 20:42

## 2020-01-01 RX ADMIN — LEVOTHYROXINE SODIUM 75 MCG: 0.07 TABLET ORAL at 06:37

## 2020-01-01 RX ADMIN — SERTRALINE HYDROCHLORIDE 100 MG: 100 TABLET, FILM COATED ORAL at 09:28

## 2020-01-01 RX ADMIN — SERTRALINE HYDROCHLORIDE 100 MG: 100 TABLET, FILM COATED ORAL at 10:23

## 2020-01-01 RX ADMIN — INSULIN LISPRO 15 UNITS: 100 INJECTION, SOLUTION INTRAVENOUS; SUBCUTANEOUS at 17:38

## 2020-01-01 RX ADMIN — RANOLAZINE 500 MG: 500 TABLET, FILM COATED, EXTENDED RELEASE ORAL at 21:10

## 2020-01-01 RX ADMIN — ATENOLOL 50 MG: 50 TABLET ORAL at 10:21

## 2020-01-01 RX ADMIN — ASPIRIN 325 MG: 325 TABLET, FILM COATED ORAL at 10:20

## 2020-01-01 RX ADMIN — INSULIN GLARGINE 36 UNITS: 100 INJECTION, SOLUTION SUBCUTANEOUS at 10:25

## 2020-01-01 RX ADMIN — ISOSORBIDE MONONITRATE 60 MG: 60 TABLET, EXTENDED RELEASE ORAL at 10:21

## 2020-01-01 RX ADMIN — LOSARTAN POTASSIUM 50 MG: 50 TABLET, FILM COATED ORAL at 11:17

## 2020-01-01 RX ADMIN — LOSARTAN POTASSIUM 50 MG: 50 TABLET, FILM COATED ORAL at 12:56

## 2020-01-01 RX ADMIN — ACETAMINOPHEN 650 MG: 325 TABLET ORAL at 20:54

## 2020-01-01 RX ADMIN — SODIUM CHLORIDE, PRESERVATIVE FREE 10 ML: 5 INJECTION INTRAVENOUS at 21:10

## 2020-01-01 RX ADMIN — ASPIRIN 325 MG: 325 TABLET, FILM COATED ORAL at 09:30

## 2020-01-01 RX ADMIN — RANOLAZINE 500 MG: 500 TABLET, FILM COATED, EXTENDED RELEASE ORAL at 23:11

## 2020-01-01 RX ADMIN — INSULIN LISPRO 5 UNITS: 100 INJECTION, SOLUTION INTRAVENOUS; SUBCUTANEOUS at 17:03

## 2020-01-01 RX ADMIN — RANOLAZINE 500 MG: 500 TABLET, FILM COATED, EXTENDED RELEASE ORAL at 21:08

## 2020-01-01 RX ADMIN — INSULIN GLARGINE 36 UNITS: 100 INJECTION, SOLUTION SUBCUTANEOUS at 21:10

## 2020-01-01 RX ADMIN — ASPIRIN 81 MG: 81 TABLET ORAL at 06:09

## 2020-01-01 RX ADMIN — CLOPIDOGREL BISULFATE 75 MG: 75 TABLET ORAL at 09:28

## 2020-01-01 RX ADMIN — RANOLAZINE 500 MG: 500 TABLET, FILM COATED, EXTENDED RELEASE ORAL at 09:28

## 2020-01-01 RX ADMIN — SERTRALINE HYDROCHLORIDE 100 MG: 100 TABLET, FILM COATED ORAL at 11:18

## 2020-01-01 RX ADMIN — CLOPIDOGREL BISULFATE 75 MG: 75 TABLET ORAL at 10:21

## 2020-01-01 RX ADMIN — ISOSORBIDE MONONITRATE 60 MG: 60 TABLET, EXTENDED RELEASE ORAL at 11:17

## 2020-01-01 RX ADMIN — IOPAMIDOL 180 ML: 755 INJECTION, SOLUTION INTRAVENOUS at 08:01

## 2020-01-01 RX ADMIN — CLOPIDOGREL BISULFATE 75 MG: 75 TABLET ORAL at 22:32

## 2020-01-01 RX ADMIN — INSULIN GLARGINE 36 UNITS: 100 INJECTION, SOLUTION SUBCUTANEOUS at 21:14

## 2020-01-01 RX ADMIN — ASPIRIN 325 MG: 325 TABLET, FILM COATED ORAL at 11:43

## 2020-01-01 RX ADMIN — MIDAZOLAM 0.5 MG: 1 INJECTION INTRAMUSCULAR; INTRAVENOUS at 09:13

## 2020-01-01 RX ADMIN — SODIUM CHLORIDE: 9 INJECTION, SOLUTION INTRAVENOUS at 20:42

## 2020-01-01 RX ADMIN — INSULIN GLARGINE 36 UNITS: 100 INJECTION, SOLUTION SUBCUTANEOUS at 09:28

## 2020-01-01 RX ADMIN — Medication 3 MG: at 03:34

## 2020-01-01 ASSESSMENT — PAIN SCALES - GENERAL
PAINLEVEL_OUTOF10: 0
PAINLEVEL_OUTOF10: 8
PAINLEVEL_OUTOF10: 0

## 2020-01-01 ASSESSMENT — PAIN DESCRIPTION - ONSET: ONSET: ON-GOING

## 2020-01-01 ASSESSMENT — PATIENT HEALTH QUESTIONNAIRE - PHQ9
SUM OF ALL RESPONSES TO PHQ QUESTIONS 1-9: 0
2. FEELING DOWN, DEPRESSED OR HOPELESS: 0
1. LITTLE INTEREST OR PLEASURE IN DOING THINGS: 0
SUM OF ALL RESPONSES TO PHQ QUESTIONS 1-9: 0
SUM OF ALL RESPONSES TO PHQ9 QUESTIONS 1 & 2: 0

## 2020-01-01 ASSESSMENT — PAIN DESCRIPTION - PROGRESSION: CLINICAL_PROGRESSION: NOT CHANGED

## 2020-01-01 ASSESSMENT — PAIN DESCRIPTION - LOCATION: LOCATION: NECK

## 2020-01-01 ASSESSMENT — PAIN DESCRIPTION - DESCRIPTORS: DESCRIPTORS: DULL

## 2020-01-01 ASSESSMENT — PAIN - FUNCTIONAL ASSESSMENT: PAIN_FUNCTIONAL_ASSESSMENT: ACTIVITIES ARE NOT PREVENTED

## 2020-01-01 ASSESSMENT — PAIN DESCRIPTION - FREQUENCY: FREQUENCY: CONTINUOUS

## 2020-01-01 ASSESSMENT — PAIN DESCRIPTION - ORIENTATION: ORIENTATION: RIGHT

## 2020-01-01 ASSESSMENT — PAIN DESCRIPTION - PAIN TYPE: TYPE: ACUTE PAIN

## 2020-01-29 PROBLEM — N28.9 RENAL INSUFFICIENCY: Status: ACTIVE | Noted: 2020-01-01

## 2020-01-30 NOTE — H&P
[address] [place] [address]                              HISTORY AND PHYSICAL    PATIENT NAME: Clemente Harp                     :        1945  MED REC NO:   [id number]                           ROOM:       0012  ACCOUNT NO:   [de-identified]                           ADMIT DATE: 2020  PROVIDER:     Adam Santacruz. Sadi Duff M.D. REASON FOR ADMISSION:  Further evaluation of a progressive angina  pectoris, coronary artery disease. HISTORY OF PRESENT ILLNESS:  The patient is a 69-year-old very nice  gentleman well known to me from prior encounter. He does have history  of congestive coronary artery disease. He had a history of open heart  surgery twice and the last one was in . The patient has multiple  interventions. The patient has been having angina pectoris. The  patient treated medically and continued to be symptomatic. He did had a  stress Cardiolite test, which was very high. Jan Eric very abnormal.   His angina pectoris class III. He does have a history of renal failure  and he has seen Dr. Hayes Hayward. His renal function is getting worse. The  patient understands the risk of a heart cath and he understands the risk  of dialysis and he was have the procedure done. This patient has been having symptoms of chest pain, angina pectoris,  shortness of breath. He had history of prior MI, history of  cardiomyopathy. He had systolic congestive heart failure in the past.    REVIEW OF THE SYSTEMS:  He does have a history of chronic renal failure  with a progression of his worsening of the renal function. He has a  history of diabetes mellitus and history of diabetic retinopathy and  history of retinal hemorrhage. He gave no history of CVA. The patient  had a history of COPD, history of morbid obesity. The patient had a  history of hearing loss, gastroesophageal reflux, diabetic neuropathy.    He had history of cerebral the  benefits, the risks, alternative methods of treatment, possible  complications with the almost need for dialysis post procedure and he is  agreeable to that. Sang Romero M.D.    D: 01/30/2020 9:00:46       T: 01/30/2020 12:14:58     [id number]  Job#: [id number]     Doc#: [id number]    CC:  Maria Dolores Lang M.D.

## 2020-01-30 NOTE — PROGRESS NOTES
Pt. was ordered to have hob flat post heart cath. Transport came to take pt. for procedure and upon entering the room, the hob was elevated 45 degrees. Pt. stated that the doctor elevated the hob. Informed primary nurse Boy Rivera, who was in another patients room. She stated she would check on the pt. shortly.

## 2020-01-30 NOTE — PROCEDURES
be  occluded before. The left main was patent with diffuse disease. There is subtotal  stenosis in the proximal circumflex, which is the dominant artery  diffuse disease through the distal circumflex. The ostium of the LAD  does exhibit about 60% narrowing. The LAD itself is totally occluded. It gives rise to the first diagonal branch, which does exhibit about 70%  narrowing in its midcourse. Diagonal branch itself is diffusely  diseased, small caliber artery. The saphenous vein graft to the second diagonal was patent with about  60% narrowing proximally. The diagonal artery is about 1.5 mm in  diameter. The LIMA to the LAD is a well-matured LIMA; however, the LAD is  diffusely diseased, about 1.5 mm in diameter. It does give collaterals  to the right and gives some collaterals to the diagonal arteries. The left ventriculogram showed left ventricular end-diastolic pressure  was 13. Ejection fraction was 35% to 40%. No mitral regurg. No  gradient across the aortic valve. 3.  Intervention of the circumflex. Left main was cannulated with 6 x  3.5 left Voda guide catheter. A Whisper wire was utilized, was placed  in the distal end of the circumflex. Subtotal stenosis of proximal  circumflex was noted. This was then predilated utilizing a 3.5 x 10 mm  cutting balloon, postdilated with a 4.0 x 12 mm noncompliant balloon and  then finally stented utilizing a 4.0 x 15 mm Xience drug-eluting stent. The stent deployed at 14 atmospheric pressure increasing the effective  size of the stent to 4.1 mm reducing the stenosis to a 0%, MILAGRO-3 flow. As mentioned, the patient does have disease of the ostium of the LAD.    The diagonal artery, still left main disease, but we did not feel that  any of those are critical and since the patient has renal failure, we  decided to treat the circumflex which is probably the culprit lesion for  his symptoms and see how he does and pending his clinical course further  recommendation will be made. Again, I did talk with Dr. Ely Mckeon, his  nephrologist to see the patient and to consider dialysis on the patient. He probably will need to have hemodialysis regardless as he seemed to be  congested and seemed to be in pulmonary edema. The patient did receive  Angiomax bolus, Angiomax drip in the cath lab. He has been on aspirin  and Plavix. He did receive diuretics. Angiogram of the right iliac artery showed patent right iliac artery  with distal runoff. Successful deployment of the Angio-Seal closure  device. No acute complications from the procedure. The estimated blood loss during this procedure around 20 mL. In summary, systolic dysfunction of the left ventricle and ejection  fraction about 35% to 40%. No mitral regurg. No gradient across the aortic valve. Total occlusion of the RCA. Subtotal stenosis of the proximal portion of the circumflex, in-stent  re-stenosis circumflex, dominant artery. Moderate-to-severe narrowing of the ostium and the LAD and moderate  disease of the mid-diagonal artery in the stented area. Total occlusion of the LAD. LIMA to the LAD was patent with the LAD has severely diffuse disease  through its entire course, about 1.5 mm in diameter, not suitable for  intervention. There are collaterals to the PDA of the RCA. Saphenous vein graft to the diagonal artery has moderate disease  proximally, small caliber diagonal artery. Angioplasty cutting balloon stent deployment of the circumflex as  mentioned above. Successful deployment of the Angio-Seal closure device. The patient  will continue current medication. The patient will be considered for  possible dialysis. Ppending his clinical course, decision will be made  whether to intervene at the left main and the proximal LAD, diagonal  artery. Overall, the patient's prognosis is not favorable.         Ben Heath M.D.    D: 01/30/2020 8:21:06       T:

## 2020-01-30 NOTE — FLOWSHEET NOTE
01/30/20 1335 01/30/20 1605   Vital Signs   BP (!) 158/56 (!) 181/74   Temp 97.5 °F (36.4 °C) 97.4 °F (36.3 °C)   Pulse 70 59   Resp 18 21   SpO2 93 % 95 %   Weight 284 lb 6.3 oz (129 kg) 283 lb 4.7 oz (128.5 kg)   Weight Method Bed scale Bed scale   Percent Weight Change 4.98 -0.39   Post-Hemodialysis Assessment   Post-Treatment Procedures  --  Blood returned;Catheter Capped, clamped with Saline x2 ports   Machine Disinfection Process  --  Acid/Vinegar Clean;Heat Disinfect; Exterior Machine Disinfection   Rinseback Volume (ml)  --  400 ml   Total Liters Processed (l/min)  --  24.6 l/min   Dialyzer Clearance  --  Lightly streaked   Duration of Treatment (minutes)  --  120 minutes   Heparin amount administered during treatment (units)  --  0 units   Hemodialysis Output (ml)  --  900 ml   NET Removed (ml)  --  500 ml   Tolerated Treatment  --  Good   Pt completed stable 2 HR TX and removed 500 ml of fluid. PT TX terminated and all blood returned to PT at end of [address]. CVC dressing changes and is clean, dry and intact. Record printed and to be scanned into PT EMR. Report given to primary nurse.

## 2020-01-30 NOTE — CARE COORDINATION
DISCHARGE PLANNING EVALUATION: OP/OBSERVATION        1/30/20, 03:17 PM    Deanna Gibbs       Admitted from: Direct admit 1/29/2020/ 1728   Location: [address] [place], [place] [address] Reason for admit: Chronic renal insufficiency [N18.9]  Renal insufficiency [N28.9]   Admit order signed?: no    Procedure: 1/30 Left Heart Cath and Possible Percutaneous Coronary Intervention planned  1/30 Dialysis cath placed. Pertinent Info/Orders/Treatment Plan:  Pt here for elective cardiac cath, prehydration. Nephrology is aware of heart cath today. IVF. Pt had to have dialysis cath placed today and is currently at dialysis. PCP: Timbo Elias MD    Patient Goals/Plan/Treatment Preferences: Pt is off the floor for dialysis. CM will follow up with pt tomorrow. Transportation/Food Security/Housekeeping Addressed:  No issues identified.

## 2020-01-30 NOTE — PROGRESS NOTES
Inpatient Cardiac Rehabilitation Consult    Received consult for Phase II Cardiac Rehabilitation. Cardiac Rehab education completed with patient. Patient stated he will be having a staged procedure. Will do rehab at [facility], but would like us to wait until the staged procedure to send referral.  Brochure given.

## 2020-01-30 NOTE — PROGRESS NOTES
1100 Patient received in IR for temp cath insertion. 1105 This procedure has been fully reviewed with the patient and written informed consent has been obtained. 1107 Procedure started with Dr. Lamonte Peña. 1117 Procedure completed; patient tolerated well. Dressing to right IJ; no bleeding noted. 1124 Patient on bed; comfort ensured. 1126 Patient taken to 8A via bed.

## 2020-01-30 NOTE — PROGRESS NOTES
Received text from RN regarding transfer of care to the hospitalist service per cardiologist request.  Dr. Gt Banks did sign out the patient to the 8B attending Dr. Ant Loredo face-to-face. Hospitalist service will graciously resume care. Spoke to RN, currently pt is in HD  Please call us with questions.     Electronically signed by Kalani Medina MD on 1/30/2020 at 3:49 PM

## 2020-01-30 NOTE — H&P
insertion (2014); Cataract removal with implant (Bilateral, October 13, right; Oct 27, left eye); Colonoscopy (9/19/2006, 2015); vascular surgery; Endoscopy, colon, diagnostic; pacemaker placement; Prostatectomy (07/27/2016); Coronary artery bypass graft (1989); Coronary artery bypass graft (1999); Coronary artery bypass graft (08/23/2016); Upper gastrointestinal endoscopy (2017); and skin biopsy. Allergies: Allergies as of 01/29/2020 - Review Complete 01/29/2020   Allergen Reaction Noted    Tape Ana Maria Rosaannes tape] Other (See Comments) 08/01/2012       Medications:   Coumadin use last 5 days:  No  Antiplatelet drug therapy use last 5 days:  Yes  Other anticoagulant use last 5 days:  No   sodium chloride flush  10 mL Intravenous 2 times per day    diphenhydrAMINE  50 mg Oral Once    hydrocortisone sodium succinate PF  200 mg Intravenous Once    aspirin  325 mg Oral Once     Prior to Admission medications    Medication Sig Start Date End Date Taking?  Authorizing Provider   ranolazine (RANEXA) 500 MG extended release tablet Take 500 mg by mouth 2 times daily   Yes Historical Provider, MD   levothyroxine (SYNTHROID) 75 MCG tablet TAKE 1 TABLET DAILY 1/27/20  Yes Aubrie Graves MD   insulin glargine (LANTUS SOLOSTAR) 100 UNIT/ML injection pen INJECT 36 UNITS UNDER THE SKIN IN THE MORNING AND 36 UNITS IN THE EVENING 1/27/20  Yes Aubrie Graves MD   sertraline (ZOLOFT) 100 MG tablet TAKE 1 TABLET DAILY 1/13/20  Yes ZHANNA Jim - CNP   Liraglutide (VICTOZA) 18 MG/3ML SOPN SC injection INJECT 1.8 MG UNDER THE SKIN DAILY 12/26/19  Yes Aubrie Graves MD   atorvastatin (LIPITOR) 80 MG tablet TAKE 1 TABLET DAILY 12/4/19  Yes Citlali Miller MD   atenolol (TENORMIN) 50 MG tablet TAKE 1 TABLET DAILY 8/23/19  Yes Aubrie Graves MD   insulin lispro (HUMALOG KWIKPEN) 100 UNIT/ML pen INJECT PER SLIDING SCALE THREE TIMES A DAY BEFORE MEALS (MAX OF 80 UNITS DAILY) 8/15/19  Yes ZHANNA Jim - CNP   isosorbide mononitrate (IMDUR) 30 MG extended release tablet  5/7/19  Yes Historical Provider, MD   FLOVENT  MCG/ACT inhaler Inhale 2 puffs into the lungs as needed  5/4/19  Yes Historical Provider, MD   clopidogrel (PLAVIX) 75 MG tablet Take 1 tablet by mouth daily 4/20/19  Yes Ezekiel Victor MD   losartan (COZAAR) 50 MG tablet Take 1 tablet by mouth daily 1/18/19  Yes Steph Kan MD   CPAP Machine [address] [facility] by Does not apply route Please change APAP pressure to 12 to 20 cm H20. 7/17/18  Yes Amy Dyer MD   potassium chloride (KLOR-CON M) 10 MEQ extended release tablet Take 1 tablet by mouth 2 times daily 5/27/18  Yes Ezekiel Victor MD   aspirin 81 MG EC tablet Take 81 mg by mouth daily   Yes Historical Provider, MD   bumetanide (BUMEX) 2 MG tablet Take 1 tablet by mouth 2 times daily  5/1/17  Yes Jose J Roldan MD   albuterol sulfate HFA (PROVENTIL HFA) 108 (90 BASE) MCG/ACT inhaler Inhale 2 puffs into the lungs every 6 hours as needed for Wheezing 8/22/16  Yes Kesha Vega PA-C   glucose blood VI test strips (ACCU-CHEK TERESA PLUS) strip Check BS 4 times a day 4/19/16  Yes Anne Marie Teixeira MD   B-D ULTRAFINE III SHORT PEN 31G X 8 MM MISC USE TO INJECT INSULIN UNDER THE SKIN FOUR TIMES A DAY 8/20/15  Yes Claudette Hitch, APRN - CNS   Multiple Vitamin (MULTI-VITAMIN) TABS   Take 1 tablet by mouth daily    Yes Historical Provider, MD   fluticasone (FLONASE) 50 MCG/ACT nasal spray USE 1 SPRAY NASALLY DAILY 1/14/20   More Chavez APRN - CNP   metOLazone (ZAROXOLYN) 2.5 MG tablet TAKE 1 TABLET BY MOUTH TWICE A DAY, 15 TO 20 MINUTES BEFORE BUMEX 10/10/19   Historical Provider, MD   Cholecalciferol (VITAMIN D3) 5000 units TABS Take 5,000 Units by mouth daily    Historical Provider, MD   nitroGLYCERIN (NITROLINGUAL) 0.4 MG/SPRAY 0.4 mg spray Place 1 spray under the tongue as needed 12/28/17   Historical Provider, MD   glucagon 1 MG injection Inject 1 mg into the skin See Admin Instructions.  Follow package directions for low blood sugar. 10/11/12   Leonela Jensen MD       Vital Signs  Vitals:    01/29/20 1757   BP: 134/69   Pulse: 57   Resp: 18   Temp: 97.5 °F (36.4 °C)   SpO2: 95%       Physical:  Heart:  regular rate and rhythm  Lungs:  Clear  Abdomen:  Soft  Mental Status:  Alert and Oriented    Planned Procedure:  Left Heart Cath and Possible Percutaneous Coronary Intervention    Sedation/ Anesthesia Plan: Midazolam and Sublimaze    ASA Classification: Class 3 - A patient with severe systemic disease that limits activity but is not incapacitating    Mallampati Airway Classification: II (soft palate, uvula, fauces visible)    · Pre-procedure diagnostic studies complete and results available. · Previous sedation/anesthesia experiences assessed. · The patient is an appropriate candidate to undergo the planned procedure sedation and anesthesia. (Refer to nursing sedation/analgesia documentation record)  · Formulation and discussion of sedation/procedure plan, risks, and expectations with patient and/or responsible adult completed. · Patient examined immediately prior to the procedure.  (Refer to nursing sedation/analgesia documentation record)    Uvaldo Gordon MD  Electronically signed 1/29/2020 at 7:10 PM  Patient Name: Vy Bello   Medical Record Number: [id number]  Date: 1/29/2020   Time: 7:10 PM   Room/Bed: [facility]

## 2020-01-31 NOTE — CARE COORDINATION
1/31/20, 12:56 PM    DISCHARGE ON GOING EVALUATION    Bridget Rodriguez day: 0  Location: 8A-12/012-A Reason for admit: Chronic renal insufficiency [N18.9]  Renal insufficiency [N28.9]  CAD in native artery [I25.10]   Procedure: 1/30 Left Heart Cath and Possible Percutaneous Coronary Intervention planned  1/30 Dialysis cath placed. Dialysis  1/31 Dialysis  Treatment Plan of Care: Hospitalist and Nephrology following. Pt went to dialysis today. States plan is for CAPD cath to be placed as OP or prior to discharge. Explained that he may need to go to OP dialysis for a few weeks once PD cath is placed waiting for it to mature, but that this will be up to Dr. Jan Post. BUN 56 (60), creatinine 4.1 (4.7). Barriers to Discharge: Not medically ready, may need OP dialysis set up. PCP: Lewis Samano MD  Readmission Risk Score: 23%  Patient Goals/Plan/Treatment Preferences: Lives with wife. He is independent and drives. He has a PCP. Uses a CPAP at home, but no other equipment. Declines need for HH.

## 2020-01-31 NOTE — PROGRESS NOTES
Renal Progress Note    Pt Name: Vivian Ocampo  MRN: [id number]  [id number]  YOB: 1945  Admit Date: 1/29/2020  5:28 PM  Date of evaluation: 1/31/2020  Primary Care Physician: Sarah Coleman MD   8A-12/012-A       Subjective: Interval History:   S/p two dialysis treatment now  Tolerating it well    Diet: DIET CARDIAC;    Medications:   Scheduled Meds:   insulin glargine  36 Units Subcutaneous BID    insulin lispro  0-6 Units Subcutaneous TID WC    insulin lispro  0-3 Units Subcutaneous Nightly    aspirin  325 mg Oral Daily    sodium chloride flush  10 mL Intravenous 2 times per day    diphenhydrAMINE  50 mg Oral Once    hydrocortisone sodium succinate PF  200 mg Intravenous Once    aspirin  325 mg Oral Once    Vitamin D  5,000 Units Oral Daily    atenolol  50 mg Oral Daily    atorvastatin  80 mg Oral Daily    clopidogrel  75 mg Oral Daily    isosorbide mononitrate  60 mg Oral Daily    levothyroxine  75 mcg Oral Daily    Liraglutide  1.8 mg Subcutaneous Daily    losartan  50 mg Oral Daily    ranolazine  500 mg Oral BID    sertraline  100 mg Oral Daily     Continuous Infusions:   dextrose         Objective:   Vitals:   BP (!) 128/58   Pulse 61   Temp 98.5 °F (36.9 °C) (Oral)   Resp 18   Ht 6' (1.829 m)   Wt 265 lb 3.4 oz (120.3 kg)   SpO2 95%   BMI 35.97 kg/m²     I&O's:    Intake/Output Summary (Last 24 hours) at 1/31/2020 1842  Last data filed at 1/31/2020 1047  Gross per 24 hour   Intake 750 ml   Output 900 ml   Net -150 ml     I/O last 3 completed shifts:   In: 750 [P.O.:350]  Out: 1800    Date 01/31/20 0000 - 01/31/20 2359   Shift 7241-1045 4184-3424 4789-1597 24 Hour Total   INTAKE   P.O.(mL/kg/hr) 150(0.2)   150   Dialysis(mL/kg)  400(3.3)  400(3.3)   Shift Total(mL/kg) 150(1.2) 400(3.3)  550(4.6)   OUTPUT   Dialysis  900  900   Shift Total(mL/kg)  900(7.5)  900(7.5)   Weight (kg) 120.9 120.3 120.3 120.3       General appearance: no distress  HEENT: PERRLA, EOMI, NON ICTERIC  Neck: NO LAD, NO THYROMEGALY  Lungs: CLEAR TO AUSCULTATION BILATERALLY  Heart: S1 S2 NORMAL, REGULAR RATE AND RHYTHM, NO AUDIBLE MURMURS  Abdomen: SOFT, NON TENDER, NON DISTENDED, NO ORGANOMEGALY FELT, BOWEL SOUNDS NORMAL  Extremities: NO EDEMA  Neurologic: GROSSLY NON FOCAL  Skin: NO RASHES  Hematology: NO BLEEDING, NO BRUISING  Genitourinary: ECHEVERRIA CATHETER IN PLACE    LABS:    CBC:   Recent Labs     01/29/20 1813 01/30/20  0919 01/31/20  0509   WBC 8.4 6.3 7.5   HGB 13.2* 12.4* 13.4*    229 194     BMP:  Recent Labs     01/29/20 1813 01/30/20  0919 01/31/20  0509    133* 135   K 4.5 3.9 4.1    100 101   CO2 19* 17* 20*   BUN 65* 60* 56*   CREATININE 5.0* 4.7* 4.1*   GLUCOSE 136* 170* 194*   CALCIUM 9.1 8.9 8.8   MG  --  2.1 2.1   PHOS  --  3.8  --      TSH: No results for input(s): TSH in the last 72 hours. HgBa1c: No results for input(s): LABA1C in the last 72 hours. Hepatic:   Recent Labs     01/29/20 1813 01/30/20 0919   LABALBU 4.0 3.4*   AST 16  --    ALT 13  --    BILITOT 0.3  --    ALKPHOS 111  --      Troponin: No results for input(s): TROPONINI in the last 72 hours. BNP: No results for input(s): BNP in the last 72 hours.   Lipids:   Recent Labs     01/29/20 1813   CHOL 126   HDL 30     INR:   Recent Labs     01/29/20 1813   INR 1.08       Images and Other:  reviewed      Assessment and Plan:   1, ESRD - started on dialysis, s/p 2 treatments   - plan to dialyze again tomorrow  - plan for permacath on Monday  - will need setup outpatient dialysis at [facility]    Will continue to follow    Electronically signed by Faina Conway MD on 1/31/2020 at [address] PM

## 2020-01-31 NOTE — FLOWSHEET NOTE
01/31/20 0800 01/31/20 1047   Vital Signs   BP (!) 147/69 (!) 164/80   Temp 98 °F (36.7 °C) 98.2 °F (36.8 °C)   Pulse 73 61   Resp 14 16   Weight 266 lb 8.6 oz (120.9 kg) 265 lb 3.4 oz (120.3 kg)   Weight Method  --  Bed scale   Percent Weight Change -0.02 -0.5   Post-Hemodialysis Assessment   Post-Treatment Procedures  --  Blood returned;Catheter Capped, clamped with Saline x2 ports   Machine Disinfection Process  --  Acid/Vinegar Clean;Heat Disinfect; Exterior Machine Disinfection   Total Liters Processed (l/min)  --  36.9 l/min   Dialyzer Clearance  --  Lightly streaked   Duration of Treatment (minutes)  --  150 minutes   Hemodialysis Intake (ml)  --  400 ml   Hemodialysis Output (ml)  --  900 ml   NET Removed (ml)  --  500 ml   Tolerated Treatment  --  Good   Edema  --  Right lower extremity; Left lower extremity   RLE Edema  --  +1;Pitting   LLE Edema  --  +1;Pitting   Stable 2.5 hour treatment. 500ml removed during dialysis. Both ports of hemodialysis catheter flushed and capped per protocol. Report given to primary RN .  Treatment record printed for scanning

## 2020-01-31 NOTE — PROGRESS NOTES
Hospitalist Progress Note      Patient:  Reynaldo Troncoso    Unit/Bed:8A-12/012-A  YOB: 1945  MRN: [id number]   Acct: [de-identified]   PCP: Dm Pedroza MD  Date of Admission: 1/29/2020    Assessment and Plan:        1. Coronary artery disease status post left heart cath 1/30/2020: Dr. Adonis Ordonez asked us to be primary on patient after heart cath. Continue ASA, high intensity lipid-lowering therapy, Plavix, Imdur, Ranexa, ARB. 2. Angina pectoris stage III: As stated in #1  3. History of CABG with multiple interventions  4. History of pacemaker implantation  5. End-stage renal disease on hemodialysis currently: We will transfer to renal telemetry floor as the patient need to stay at least 5 days which would compromise a stay in observational floor. Nephrology team on board, patient had multiple left heart cath and he understood the risk of having multiple heart cath which would worsening kidney function. 6. Diabetic retinopathy: Last A1c 9.1 as of October 2019, repeat A1c, start sliding scale, adjust insulin according to A1c results. Resume Lantus dose of 36 units every 12 hours for now. Continue AC at bedtime, hypoglycemia protocol. 7. Peripheral neuropathy  8. 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 BMI more than 35 obesity  10. COPD/stable    PMH, PSH, SH, FHX reviewed in chart review snap shot in EPIC    CC: End Stage renal disease  HPI: Initial HPI from cardiologist reviewed as below:  The patient is a 28-year-old very nice  gentleman well known to me from prior encounter. He does have history  of congestive coronary artery disease. He had a history of open heart  surgery twice and the last one was in 1999. The patient has multiple  interventions. The patient has been having angina pectoris. The  patient treated medically and continued to be symptomatic. He did had a  stress Cardiolite test, which was very high.   [facility] very abnormal.   His angina pectoris class RRR without murmur. No JVD. CABG scar appreciated. Abdomen: Increased abdominal girth, soft. Nontender. Bowel sounds positive. Extremities:  No clubbing, cyanosis, or edema in all extremities. Vasculature: capillary refill < 3 seconds. Pedal pulses intact bilaterally. Skin:  warm and dry. Not clammy. SK scattered at the back. Psych:  Alert to time, person and place. Communicable. Affect appropriate  Lymph:  No supraclavicular ,and no anterior cervical lymphadenopathy. Neurologic:  No focal deficit. CN II-XII grossly intact. Motor and Sensory capacity intact in all extremities. Labs:   Recent Labs     01/29/20  1813 01/30/20  0919 01/31/20  0509   WBC 8.4 6.3 7.5   HGB 13.2* 12.4* 13.4*   HCT 39.5* 38.8* 41.7*    229 194     Recent Labs     01/29/20  1813 01/30/20  0919 01/31/20  0509    133* 135   K 4.5 3.9 4.1    100 101   CO2 19* 17* 20*   BUN 65* 60* 56*   CREATININE 5.0* 4.7* 4.1*   CALCIUM 9.1 8.9 8.8   PHOS  --  3.8  --      Recent Labs     01/29/20  1813   AST 16   ALT 13   BILITOT 0.3   ALKPHOS 111     Recent Labs     01/29/20  1813   INR 1.08     No results for input(s): Song Dennison in the last 72 hours.     Microbiology:    Blood culture #1: No results found for: BC    Blood culture #2:No results found for: BLOODCULT2    Organism:  Lab Results   Component Value Date    ORG Enterobacter aerogenes 07/02/2019       No results found for: LABGRAM    MRSA culture only:No results found for: [facility]    Urine culture:   Lab Results   Component Value Date    LABURIN No growth-preliminary  No growth   02/17/2016       Respiratory culture: No results found for: CULTRESP    Aerobic and Anaerobic :  No results found for: LABAERO  No results found for: LABANAE    Urinalysis:      Lab Results   Component Value Date    NITRU NEGATIVE 01/29/2020    WBCUA 0-2 01/29/2020    WBCUA 0-5 10/09/2019    BACTERIA NONE SEEN 01/29/2020    RBCUA 0-2 01/29/2020    BLOODU TRACE 01/29/2020 Status post successful nontunneled dialysis catheter insertion. **This report has been created using voice recognition software. It may contain minor errors which are inherent in voice recognition technology. ** Final report electronically signed by Dr. Landy Sandy on 1/30/2020 11:30 AM      Discussed plan with patient and wife. Patient and wife verbalized understanding and agree. All questions addressed with concerns. Please excuse my TYPOS!     Electronically signed by Lori Cao MD on 1/31/2020 at 2:46 PM

## 2020-01-31 NOTE — CONSULTS
Nephrology Consult Note  [facility]    PatPt Name: Mi Rosales  MRN: [id number]  [id number]  YOB: 1945  Admit Date: 1/29/2020  5:28 PM  Date of evaluation: 1/30/2020  Primary Care Physician: Sharif Tobin MD   8A-12/012-A     Nephrologist:  ARABELLA Garvey    Reason for Consult:  Acute kidney injury  Requesting Physician: Dr Miguel Saucedo Physician  Sharfi Tobin MD    Chief Complaint:  Acute kidney injury  History Obtained From:  Patient and RN    History of Present Ilness: The patient is a 41-year-old very old male with history coronary artery disease s/p CABG , hypertension, chronic kidney disease stage 4/5, hypertension, diabetes-2, multiple PCI. Patient had been having angina pectoris and was treated medically but had continued to be symptomatic. He did had a stress test which was abnormal. For his chronic kidney disease he is followed by Dr. Tarsha Triplett. His renal function has been progressively getting worse and he has become more diuretic resistent. Patient was admitted for cardiac catherization and nephrology consultation has been obtained in assisting with further management. I saw the patient post cardiac cath and he is complaining of some shortness of breath. No chest pain reported. No urinary symptoms reported.     PMHx:   Past Medical History:   Diagnosis Date    Angina pectoris ([address]     Blood circulation, collateral     CAD (coronary artery disease) 1989    Status post bypass, Status post stents    Cancer ([address] [phone],[phone]    Melanoma x2     Cancer ([address] 05/2016    Prostate     Carotid artery disease ([address] 2008    moderate    Cerebral artery occlusion with cerebral infarction ([address]     CHF (congestive heart failure) ([address] 1985    CKD (chronic kidney disease), stage III ([address] 2012    has lost 13% more of kidney function 2016    Detached retina     Diabetes mellitus ([address]     Diabetic nephropathy/sclerosis ([address] 2012    Diverticula

## 2020-02-01 NOTE — PLAN OF CARE
Problem: Falls - Risk of:  Goal: Will remain free from falls  Description  Will remain free from falls  Outcome: Ongoing  Note:   Bed alarm on. call light in reach, bed lowest position and wheels locked. Educated on use of call light before ambulation and when in need of assistance. Patient expressed understanding. Nonskid socks on. Hourly visual checks performed and charted. Toileting offered to patient. Arm band & falling star in place. Will continue to monitor. No falls during shift. Problem: Pain:  Goal: Pain level will decrease  Description  Pain level will decrease  Outcome: Ongoing  Note:   Pain assessed every hour and PRN. Denies any pain this shift. Will continue to monitor. Problem: DISCHARGE BARRIERS  Goal: Patient's continuum of care needs are met  Outcome: Ongoing  Note:   No discharge date at this time.  on case. Pt. Planning to go home with wife after discharge. Will monitor for after discharge needs. Problem: Tissue Perfusion - Cardiopulmonary, Altered:  Goal: Absence of angina  Description  Absence of angina  Outcome: Ongoing  Note:   No chest pain or SOB voiced this shift. Problem: Fluid Volume:  Goal: Will show no signs or symptoms of fluid imbalance  Description  Will show no signs or symptoms of fluid imbalance  Outcome: Ongoing  Note:   Pt had dialysis on 1/30 and 500 ml was removed and on 1/31, 500 ml was removed. Pt is to have dialysis 2/1 AM.       Care plan reviewed with patient. Patient verbalize understanding of the plan of care and contribute to goal setting.

## 2020-02-01 NOTE — PROGRESS NOTES
Renal Progress Note    Pt Name: Evelyne Prader  MRN: [id number]  [id number]  YOB: 1945  Admit Date: 1/29/2020  5:28 PM  Date of evaluation: 2/1/2020  Primary Care Physician: Tito Coker MD   6K-10/010-A       Subjective: Interval History:   No sob  Tolerated dialysis treatment well    Diet: DIET CARDIAC;    Medications:   Scheduled Meds:   [START ON 2/2/2020] insulin lispro  0-40 Units Subcutaneous TID WC    insulin glargine  36 Units Subcutaneous BID    insulin lispro  0-6 Units Subcutaneous TID WC    insulin lispro  0-3 Units Subcutaneous Nightly    aspirin  325 mg Oral Daily    sodium chloride flush  10 mL Intravenous 2 times per day    diphenhydrAMINE  50 mg Oral Once    hydrocortisone sodium succinate PF  200 mg Intravenous Once    aspirin  325 mg Oral Once    Vitamin D  5,000 Units Oral Daily    atenolol  50 mg Oral Daily    atorvastatin  80 mg Oral Daily    clopidogrel  75 mg Oral Daily    isosorbide mononitrate  60 mg Oral Daily    levothyroxine  75 mcg Oral Daily    Liraglutide  1.8 mg Subcutaneous Daily    losartan  50 mg Oral Daily    ranolazine  500 mg Oral BID    sertraline  100 mg Oral Daily     Continuous Infusions:   dextrose         Objective:   Vitals:   BP (!) 98/57   Pulse 68   Temp 98 °F (36.7 °C) (Oral)   Resp 18   Ht 6' (1.829 m)   Wt 263 lb 3.7 oz (119.4 kg)   SpO2 95%   BMI 35.70 kg/m²     I&O's:    Intake/Output Summary (Last 24 hours) at 2/1/2020 1751  Last data filed at 2/1/2020 1343  Gross per 24 hour   Intake 900 ml   Output 1400 ml   Net -500 ml     I/O last 3 completed shifts: In: 900 [P.O.:900]  Out: 1400    Date 02/01/20 0000 - 02/01/20 2359   Shift 3339-3840 8095-1557 2590-0792 24 Hour Total   INTAKE   P.O.(mL/kg/hr) 0(0) 660(0.7)  660   I. V.(mL/kg) 0(0) 0(0)  0(0)   Shift Total(mL/kg) 0(0) 660(5.5)  660(5.5)   OUTPUT   Urine(mL/kg/hr)  0(0)  0   Emesis/NG output(mL/kg)  0(0)  0(0)   Other(mL/kg)  0(0)  0(0)   Stool(mL/kg)  0(0) 0(0)   Blood(mL/kg)  0(0)  0(0)   Dialysis  1400  1400   Shift Total(mL/kg)  1400(11.7)  1400(11.7)   Weight (kg) 120.4 119.4 119.4 119.4       General appearance: no distress  HEENT: PERRLA, EOMI, NON ICTERIC  Neck: NO LAD, NO THYROMEGALY  Lungs: CLEAR TO AUSCULTATION BILATERALLY  Heart: S1 S2 NORMAL, REGULAR RATE AND RHYTHM, NO AUDIBLE MURMURS  Abdomen: SOFT, NON TENDER, NON DISTENDED, NO ORGANOMEGALY FELT, BOWEL SOUNDS NORMAL  Extremities: NO EDEMA  Neurologic: GROSSLY NON FOCAL  Skin: NO RASHES  Hematology: NO BLEEDING, NO BRUISING  Genitourinary:  No lesiomns    LABS:    CBC:   Recent Labs     01/30/20  0919 01/31/20  0509 02/01/20  0556   WBC 6.3 7.5 7.9   HGB 12.4* 13.4* 13.0*    194 189     BMP:  Recent Labs     01/30/20  0919 01/31/20  0509 02/01/20  0556   * 135 135   K 3.9 4.1 4.0    101 99   CO2 17* 20* 18*   BUN 60* 56* 48*   CREATININE 4.7* 4.1* 4.2*   GLUCOSE 170* 194* 210*   CALCIUM 8.9 8.8 8.8   MG 2.1 2.1  --    PHOS 3.8  --   --      TSH: No results for input(s): TSH in the last 72 hours. HgBa1c:   Recent Labs     02/01/20  0556   LABA1C 9.4*     Hepatic:   Recent Labs     01/29/20 1813 01/30/20 0919   LABALBU 4.0 3.4*   AST 16  --    ALT 13  --    BILITOT 0.3  --    ALKPHOS 111  --      Troponin: No results for input(s): TROPONINI in the last 72 hours. BNP: No results for input(s): BNP in the last 72 hours.   Lipids:   Recent Labs     01/29/20 1813   CHOL 126   HDL 30     INR:   Recent Labs     01/29/20 1813   INR 1.08       Images and Other:  reviewed      Assessment and Plan:   1, ESRD - started on dialysis, s/p 3 treatments   2, Hypertension - now well controlled  3, Type 2 diabetes  4, CAD s/p PCI  5, s/p PPM   6, Hyperlipidemia      - plan to dialyze MWF  - plan for permacath on Monday  - Obtain consent from cardiology regarding Aspirin and Plavix prior to the procedure as patient recently had PCI  - will need setup outpatient dialysis at [facility]  - decrease dose of Losartan as BP is becoming lower     Will continue to follow    Electronically signed by Timbo Elias MD on 2/1/2020 at 5:51 PM

## 2020-02-02 NOTE — PROGRESS NOTES
Hospitalist Progress Note    Patient:  Luz Marina Celeste      Unit/Bed:6K-10/010-A    YOB: 1945    MRN: [id number]       Acct: [de-identified]     PCP: Stephanie Toney MD    Date of Admission: 1/29/2020    Active Hospital Problems    Diagnosis Date Noted    Renal insufficiency [N28.9] 01/29/2020    CAD in native artery [I25.10] 03/21/2018       Assessment/Plan:    1. Coronary artery disease status post left heart cath 1/30/2020: Dr. Chan Bailey asked us to be primary on patient after heart cath. On ASA, statin, Plavix, Imdur, Ranexa, ARB. CP freeWill monitor    2. History of CABG with multiple interventions  3. History of pacemaker implantation  4. End-stage renal disease on hemodialysis currently: started on IHD x 3 sessions now on this admission, awaiting permanent CV line on Monday 2/3/2020 with plan for outpatient dialysis at [facility]. Cr stable at 4.2 with no indication for urgent RRT. Nephrology also following  5. DM II with Diabetic retinopathy: poorly controlled with HbA1c 9.4%. noted pt on MDI with Glargine BID. Prefer glargine QD with lispro TID. However, pt wishes to follow his home regimen. BS in 100s with few low 200s. Will monitor for now. Further counseling provided  6. Isolated triglyceridemia: Currently on high intensity lipid-lowering therapy, lifestyle modifications. 7. Peripheral neuropathy  8. 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 BMI more than 35 obesity  10. COPD/stable      Expected discharge date:  TBD         Disposition:      [x] Home                             [] TCU                             [] Rehab                             [] Psych                             [] SNF                             [] [place]                             [] Other-    Chief Complaint: No chief complaint on file. Hospital Course: Patient was seen, examined and the medical chart was reviewed thoroughly today. In summary, 75 y. o.male admitted on 1/31/2020 for LHC. Pt being followed by cardiology and nephrology services. He was transferred to [facility] from  yesterday. I took over care on 2/2. Subjective (past 24 hours):   denies CP/SOB/presyncope/palpitation/fever/chills       Medications:  Reviewed    Infusion Medications    dextrose       Scheduled Medications    insulin lispro  0-40 Units Subcutaneous TID WC    losartan  25 mg Oral Daily    insulin glargine  36 Units Subcutaneous BID    insulin lispro  0-6 Units Subcutaneous TID WC    insulin lispro  0-3 Units Subcutaneous Nightly    aspirin  325 mg Oral Daily    sodium chloride flush  10 mL Intravenous 2 times per day    diphenhydrAMINE  50 mg Oral Once    hydrocortisone sodium succinate PF  200 mg Intravenous Once    aspirin  325 mg Oral Once    Vitamin D  5,000 Units Oral Daily    atenolol  50 mg Oral Daily    atorvastatin  80 mg Oral Daily    clopidogrel  75 mg Oral Daily    isosorbide mononitrate  60 mg Oral Daily    levothyroxine  75 mcg Oral Daily    Liraglutide  1.8 mg Subcutaneous Daily    ranolazine  500 mg Oral BID    sertraline  100 mg Oral Daily     PRN Meds: melatonin, glucose, dextrose, glucagon (rDNA), dextrose, acetaminophen, magnesium hydroxide, ondansetron, sodium chloride flush, nitroGLYCERIN      Intake/Output Summary (Last 24 hours) at 2/2/2020 0855  Last data filed at 2/2/2020 5230  Gross per 24 hour   Intake 1380 ml   Output 1400 ml   Net -20 ml       Diet:  DIET CARDIAC; Exam:  /63   Pulse 69   Temp 98 °F (36.7 °C) (Oral)   Resp 17   Ht 6' (1.829 m)   Wt 263 lb 3.7 oz (119.4 kg)   SpO2 95%   BMI 35.70 kg/m²     General appearance: Obese, A&O x3, Not ill or toxic, in no apparent distress  HEENT:  DIANE  EOM intact. Neck: Supple, with full range of motion. No jugular venous distention. Trachea midline.   Respiratory:   NL A/E bilat with no adventitious sounds   Cardiovascular:  normal S1/S2 with no murmurs/gallops  Abdomen: Soft, pulse-ox monitoring devices by a registered nurse. Following local anesthesia and utilizing MAXIMAL STERILE BARRIER TECHNIQUE, the vein listed above was punctured with a micropuncture needle, utilizing ultrasound guidance, an image was obtained confirming needle position and vessel patency. .  A .018 wire  was passed through this needle, followed by insertion of a 4 Western Nette dilator. Following guide wire and catheter exchange, a percutaneous tract was dilated to a 14 Western Nette size. Then, the temporary dialysis catheter was advanced over an .035 guide wire, with fluoroscopic guidance, with the tip at the location as specified above. This was all performed with fluoroscopic guidance. The hub of the catheter was then sutured to the skin with 2-0 silk suture. An antibacterial Biopatch was applied to the catheter exit site along with a sterile OpSite dressing. Status post successful nontunneled dialysis catheter insertion. **This report has been created using voice recognition software. It may contain minor errors which are inherent in voice recognition technology. ** Final report electronically signed by Dr. Fantasma Seals on 1/30/2020 11:30 AM      Diet: DIET CARDIAC;    DVT prophylaxis: [] Lovenox                                 [x] SCDs                                 [] SQ Heparin                                 [] Encourage ambulation           [] Already on Anticoagulation       Code Status: Full Code      Active Hospital Problems    Diagnosis Date Noted    Renal insufficiency [N28.9] 01/29/2020    CAD in native artery [I25.10] 03/21/2018           With RN in room, patient was updated about the treatment plan, all the questions and concerns were addressed.         Electronically signed by Nate Gibbs MD on 2/2/2020 at 8:55 AM

## 2020-02-03 NOTE — PROGRESS NOTES
Admit Date: 1/29/2020  Hospital day 3    Subjective:     Patient no chest pain . Medication side effects: none    Scheduled Meds:   insulin lispro  0-40 Units Subcutaneous TID WC    losartan  25 mg Oral Daily    insulin glargine  36 Units Subcutaneous BID    insulin lispro  0-6 Units Subcutaneous TID WC    insulin lispro  0-3 Units Subcutaneous Nightly    aspirin  325 mg Oral Daily    sodium chloride flush  10 mL Intravenous 2 times per day    diphenhydrAMINE  50 mg Oral Once    hydrocortisone sodium succinate PF  200 mg Intravenous Once    aspirin  325 mg Oral Once    Vitamin D  5,000 Units Oral Daily    atenolol  50 mg Oral Daily    atorvastatin  80 mg Oral Daily    clopidogrel  75 mg Oral Daily    isosorbide mononitrate  60 mg Oral Daily    levothyroxine  75 mcg Oral Daily    Liraglutide  1.8 mg Subcutaneous Daily    ranolazine  500 mg Oral BID    sertraline  100 mg Oral Daily     Continuous Infusions:   dextrose       PRN Meds:melatonin, glucose, dextrose, glucagon (rDNA), dextrose, acetaminophen, magnesium hydroxide, ondansetron, sodium chloride flush, nitroGLYCERIN    Review of Systems  Pertinent items are noted in HPI. Objective:     Patient Vitals for the past 8 hrs:   BP Temp Temp src Pulse Resp SpO2   02/02/20 1616 (!) 142/67 97.9 °F (36.6 °C) Oral 63 16 98 %     I/O last 3 completed shifts:   In: 1315 [P.O.:1315]  Out: 0     No change     ECG: na.   Data Review:   CBC:  Lab Results   Component Value Date    WBC 8.1 02/02/2020    RBC 3.94 02/02/2020    RBC 0-2 05/29/2012    HGB 12.6 02/02/2020    HCT 37.7 02/02/2020    MCV 95.7 02/02/2020    MCH 32.0 02/02/2020    MCHC 33.4 02/02/2020    RDW 13.7 11/18/2019     02/02/2020    MPV 11.7 02/02/2020     BMP  Lab Results   Component Value Date     02/02/2020    K 3.7 02/02/2020    K 4.5 01/29/2020    CL 99 02/02/2020    CO2 21 02/02/2020    BUN 43 02/02/2020    CREATININE 4.2 02/02/2020    CALCIUM 8.9 02/02/2020      COAG PROFILE:  Lab Results   Component Value Date    APTT 41.7 06/11/2019    INR 1.08 01/29/2020       Assessment:     Active Problems:    CAD in native artery    Renal insufficiency  Resolved Problems:    * No resolved hospital problems.  *      Plan:   Patient had no chest pain    on dialyses    anaemia    cad    htn    cardiac wise stable    discharge when ok with nephrology

## 2020-02-03 NOTE — PROGRESS NOTES
examined and the medical chart was reviewed thoroughly today. In summary, 75 y. o.male admitted on 1/31/2020 for [facility]. Pt being followed by cardiology and nephrology services. He was transferred to [facility] from  yesterday. I took over care on 2/2. Subjective (past 24 hours):   denies CP/SOB/presyncope/palpitation/fever/chills; wishes to go home; understands waiting for perm cath placement      Medications:  Reviewed    Infusion Medications    dextrose       Scheduled Medications    insulin lispro  0-40 Units Subcutaneous TID WC    losartan  25 mg Oral Daily    insulin glargine  36 Units Subcutaneous BID    insulin lispro  0-6 Units Subcutaneous TID WC    insulin lispro  0-3 Units Subcutaneous Nightly    aspirin  325 mg Oral Daily    sodium chloride flush  10 mL Intravenous 2 times per day    diphenhydrAMINE  50 mg Oral Once    hydrocortisone sodium succinate PF  200 mg Intravenous Once    aspirin  325 mg Oral Once    Vitamin D  5,000 Units Oral Daily    atenolol  50 mg Oral Daily    atorvastatin  80 mg Oral Daily    clopidogrel  75 mg Oral Daily    isosorbide mononitrate  60 mg Oral Daily    levothyroxine  75 mcg Oral Daily    Liraglutide  1.8 mg Subcutaneous Daily    ranolazine  500 mg Oral BID    sertraline  100 mg Oral Daily     PRN Meds: melatonin, glucose, dextrose, glucagon (rDNA), dextrose, acetaminophen, magnesium hydroxide, ondansetron, sodium chloride flush, nitroGLYCERIN      Intake/Output Summary (Last 24 hours) at 2/3/2020 0754  Last data filed at 2/3/2020 0515  Gross per 24 hour   Intake 1695 ml   Output --   Net 1695 ml       Diet:  DIET CARDIAC; Exam:  /70   Pulse 66   Temp 97.8 °F (36.6 °C) (Oral)   Resp 17   Ht 6' (1.829 m)   Wt 264 lb 9.6 oz (120 kg)   SpO2 96%   BMI 35.89 kg/m²     General appearance: Obese, A&O x3, Not ill or toxic, in no apparent distress  HEENT:  DIANE  EOM intact. Neck: Supple, with full range of motion. No jugular venous distention.

## 2020-02-03 NOTE — PROGRESS NOTES
Renal Progress Note    Pt Name: Harshad Blake  MRN: [id number]  [id number]  YOB: 1945  Admit Date: 1/29/2020  5:28 PM  Date of evaluation: 2/3/2020  Primary Care Physician: Santos Miles MD   6K-10/010-A       Subjective: Interval History:   No complaints today    Diet: DIET CARDIAC;    Medications:   Scheduled Meds:   insulin lispro  0-40 Units Subcutaneous TID WC    losartan  25 mg Oral Daily    insulin glargine  36 Units Subcutaneous BID    insulin lispro  0-6 Units Subcutaneous TID WC    insulin lispro  0-3 Units Subcutaneous Nightly    aspirin  325 mg Oral Daily    sodium chloride flush  10 mL Intravenous 2 times per day    diphenhydrAMINE  50 mg Oral Once    hydrocortisone sodium succinate PF  200 mg Intravenous Once    aspirin  325 mg Oral Once    Vitamin D  5,000 Units Oral Daily    atenolol  50 mg Oral Daily    atorvastatin  80 mg Oral Daily    clopidogrel  75 mg Oral Daily    isosorbide mononitrate  60 mg Oral Daily    levothyroxine  75 mcg Oral Daily    Liraglutide  1.8 mg Subcutaneous Daily    ranolazine  500 mg Oral BID    sertraline  100 mg Oral Daily     Continuous Infusions:   dextrose         Objective:   Vitals:   BP (!) 142/67   Pulse 65   Temp 98 °F (36.7 °C) (Oral)   Resp 16   Ht 6' (1.829 m)   Wt 263 lb 7.2 oz (119.5 kg)   SpO2 96%   BMI 35.73 kg/m²     I&O's:    Intake/Output Summary (Last 24 hours) at 2/3/2020 1747  Last data filed at 2/3/2020 1642  Gross per 24 hour   Intake 1875 ml   Output 900 ml   Net 975 ml     I/O last 3 completed shifts:   In: 1520 [P.O.:1520]  Out: 900    Date 02/03/20 0000 - 02/03/20 2359   Shift 5203-6301 7174-2326 4984-4051 24 Hour Total   INTAKE   P.O.(mL/kg/hr) 300(0.3) 420(0.4) 355 1075   I.V.(mL/kg) 0(0)   0(0)   Shift Total(mL/kg) 300(2.5) 420(3.5) 355(3) 1075(9)   OUTPUT   Urine(mL/kg/hr)   0 0   Emesis/NG output(mL/kg)   0(0) 0(0)   Other(mL/kg)   0(0) 0(0)   Stool(mL/kg)   0(0) 0(0)   Blood(mL/kg)   0(0) 0(0)

## 2020-02-03 NOTE — PROGRESS NOTES
Shift Total(mL/kg) 0(0)   0(0)   Weight (kg) 119.4 119.4 119.4 119.4       General appearance: no distress  HEENT: PERRLA, EOMI, NON ICTERIC  Neck: NO LAD, NO THYROMEGALY  Lungs: CLEAR TO AUSCULTATION BILATERALLY  Heart: S1 S2 NORMAL, REGULAR RATE AND RHYTHM, NO AUDIBLE MURMURS  Abdomen: SOFT, NON TENDER, NON DISTENDED, NO ORGANOMEGALY FELT, BOWEL SOUNDS NORMAL  Extremities: NO EDEMA  Neurologic: GROSSLY NON FOCAL  Skin: NO RASHES  Hematology: NO BLEEDING, NO BRUISING  Genitourinary: no lesions    LABS:    CBC:   Recent Labs     01/31/20  0509 02/01/20  0556 02/02/20  0628   WBC 7.5 7.9 8.1   HGB 13.4* 13.0* 12.6*    189 172     BMP:  Recent Labs     01/31/20  0509 02/01/20  0556 02/02/20  0628    135 137   K 4.1 4.0 3.7    99 99   CO2 20* 18* 21*   BUN 56* 48* 43*   CREATININE 4.1* 4.2* 4.2*   GLUCOSE 194* 210* 207*   CALCIUM 8.8 8.8 8.9   MG 2.1  --  2.2     TSH: No results for input(s): TSH in the last 72 hours. HgBa1c:   Recent Labs     02/01/20  0556   LABA1C 9.4*     Hepatic: No results for input(s): LABALBU, AST, ALT, ALB, BILITOT, ALKPHOS in the last 72 hours. Troponin: No results for input(s): TROPONINI in the last 72 hours. BNP: No results for input(s): BNP in the last 72 hours. Lipids: No results for input(s): CHOL, HDL in the last 72 hours. Invalid input(s): LDLCALCU  INR: No results for input(s): INR in the last 72 hours.     Images and Other:  reviewed    Assessment and Plan:   1, ESRD - started on dialysis, s/p 3 treatments   2, Hypertension - now well controlled  3, Type 2 diabetes  4, CAD s/p PCI  5, s/p PPM   6, Hyperlipidemia     - plan to dialyze MWF  - plan for permacath on Monday  - Obtain consent from cardiology regarding Aspirin and Plavix prior to the procedure as patient recently had PCI  - will need setup outpatient dialysis at [facility]    Will continue to follow    Electronically signed by Marian Moralez MD on 2/2/2020 at 03:00 PM

## 2020-02-04 NOTE — TELEPHONE ENCOUNTER
.Transition of Care visit scheduled.   2/10/2020  Patient is being discharged to home  Date of discharge 2/4/20  Discharge from facility [facility]  Reason for admission renal insufficiency, cardiac cath with stents

## 2020-02-04 NOTE — DISCHARGE SUMMARY
Hospital Medicine Discharge Summary      Patient Identification:   Sherlyn Wood   : [id number]  MRN: [id number]   Account: [de-identified]      Patient's PCP: Shmuel Bustos MD    Admit Date: 2020     Discharge Date:   2020      Admitting Physician: Estelita Villa MD     Discharge Physician: Esperanza Demarco MD     Discharge Diagnoses: Active Hospital Problems    Diagnosis Date Noted    Renal insufficiency [N28.9] 2020    CAD in native artery [I25.10] 2018       The patient was seen and examined on day of discharge and this discharge summary is in conjunction with any daily progress note from day of discharge. Hospital Course:   Sherlyn Wood is a 75 y.o. male admitted to [facility] on 2020 for for UA. Pt being followed by cardiology and nephrology services. He was transferred to [facility] from  yesterday. I took over care on . Pt responded well to medical management, remained clinically stable and was discharged in stable conditions after cleared by consulting services. Assessment/Plan:    1. UA/ s/p left heart cath 2020: Dr. Jaime Sow asked us to be primary on patient after heart cath.  On ASA, statin, Plavix, Imdur, Ranexa, ARB. CP free.      2. History of CABG with multiple interventions  3. History of pacemaker implantation  4. End-stage renal disease on hemodialysis currently: started on IHD x 3 sessions now on this admission, awaiting permanent CV line on Monday 2/3/2020 with plan for outpatient dialysis at [facility] stable at 4.5. Pt had IHD today. Nephrology also following. Awaiting perm cath placement  : s/p permanent HD cath placed today w/o immediate complications. OP IHD at [facility] arranged via . 5. DM II with Diabetic retinopathy: poorly controlled with HbA1c 9.4%. noted pt on MDI with Glargine BID. Prefer glargine QD with lispro TID. However, pt wishes to follow his home regimen. BS in 100s with few low 200s. Will monitor for now. Further counseling provided. 2/4: BS in 100s. Pt wishes to continue with home regimen. Pt and PCP to monitor BS and adjust regimen accordingly. 6. Isolated triglyceridemia: Currently on high intensity lipid-lowering therapy, lifestyle modifications. 7. Peripheral neuropathy  8. 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 BMI more than 35 obesity  10. Hx of COPD: stable      Exam:     Vitals:  Vitals:    02/04/20 0925 02/04/20 0930 02/04/20 0935 02/04/20 0940   BP: (!) 153/56 (!) 133/58 (!) 137/59 (!) 147/64   Pulse: 60 60 60 60   Resp: 20 17 17 17   Temp:       TempSrc:       SpO2: 90% 93% 91% 93%   Weight:       Height:         Weight: Weight: 262 lb 1.6 oz (118.9 kg)     24 hour intake/output:    Intake/Output Summary (Last 24 hours) at 2/4/2020 1102  Last data filed at 2/4/2020 0445  Gross per 24 hour   Intake 1475 ml   Output 900 ml   Net 575 ml           General appearance: Obese, A&O x3, Not ill or toxic, in no apparent distress  HEENT:  DIANE  EOM intact. Neck: Supple, with full range of motion. No jugular venous distention. Trachea midline. CV cath in-situ, dressing C/D/I  Respiratory:   NL A/E bilat with no adventitious sounds   Cardiovascular:  normal S1/S2 with no murmurs/gallops  Abdomen: Soft, non-tender, non-distended, no rigidity or peritoneal signs  Musculoskeletal: NL symmetrical A/PROM bilat U/L extremities   Skin: No rashes. No edema  Neurologic:  CN II-XII intact. NL symmetrical reflexes. NL gait and stance. NL Cerebellar exam. Power 5/5 all muscle groups U/L extremities. Toes downgoing  Capillary Refill: Brisk,< 3 seconds   Peripheral Pulses: +2 palpable, equal bilaterally              Labs:  For convenience and continuity at follow-up the following most recent labs are provided:      CBC:    Lab Results   Component Value Date    WBC 7.5 02/04/2020    HGB 13.2 02/04/2020    HCT 39.9 02/04/2020     02/04/2020       Renal:    Lab Results

## 2020-02-04 NOTE — H&P
Formulation and discussion of sedation / procedure plans, risks, benefits, side effects and alternatives with patient and/or responsible adult completed.     Electronically signed by Teagan Bone MD on 2/4/2020 at 9:13 AM

## 2020-02-04 NOTE — H&P
[facility]  Sedation/Analgesia History & Physical    Pt Name: Chrissy Jovel  MRN: [id number]  YOB: 1945  Provider Performing Procedure: Tiffani Ramos MD  Primary Care Physician: Adam Duncan MD    PRE-PROCEDURE   DNR-CCA/DNR-CC []Yes [x]No  Brief History/Pre-Procedure Diagnosis: Renal failure          MEDICAL HISTORY  [x]CAD/Valve  []Liver Disease  []Lung Disease []Diabetes  []Hypertension [x]Renal Disease  []Additional information:       has a past medical history of Angina pectoris ([address] Blood circulation, collateral, CAD (coronary artery disease), Cancer ([address] Cancer ([address] Carotid artery disease ([address] Cerebral artery occlusion with cerebral infarction ([address] CHF (congestive heart failure) ([address] CKD (chronic kidney disease), stage III ([address] Detached retina, Diabetes mellitus ([address] Diabetic nephropathy/sclerosis ([address] Diverticula of colon, GERD (gastroesophageal reflux disease), Hearing loss, History of blood transfusion, Hyperlipidemia, Hypertension, Hypothyroidism, Peripheral vascular disease ([address] Pneumonia, Prostate cancer ([address] Retinopathy due to secondary diabetes ([address] Tobacco abuse, Type II or unspecified type diabetes mellitus without mention of complication, not stated as uncontrolled, Vision blurred, Vitreous hemorrhage of right eye ([address] and Wears glasses. SURGICAL HISTORY   has a past surgical history that includes Tonsillectomy (child); Leg Surgery (1999); Appendectomy (1967); Cholecystectomy (1990); hernia repair (1989); retinal laser (December 2013); retinal laser; malignant skin lesion excision (2011); Refractive surgery (Bilateral); vitrectomy (2/13/14); Cardiac surgery (1989, 1999); Cardiac catheterization; other surgical history; vitrectomy (Right, 03/06/14); eye surgery (Feb and March 2014); Pacemaker insertion (2014); Cataract removal with implant (Bilateral, October 13, right; Oct 27, left eye);  Colonoscopy (9/19/2006, 2015); vascular surgery; Endoscopy, colon, diagnostic; pacemaker placement; Prostatectomy (07/27/2016); Coronary artery bypass graft (1989); Coronary artery bypass graft (1999); Coronary artery bypass graft (08/23/2016); Upper gastrointestinal endoscopy (2017); and skin biopsy.   Additional information:       ALLERGIES   Allergies as of 01/29/2020 - Review Complete 01/29/2020   Allergen Reaction Noted    Tape [adhesive tape] Other (See Comments) 08/01/2012     Additional information:       MEDICATIONS   Coumadin Use Last 5 Days [x]No []Yes  Antiplatelet drug therapy use last 5 days  [x]No []Yes  Other anticoagulant use last 5 days  [x]No []Yes    Current Facility-Administered Medications:     bacitracin 50,000 Units in sodium chloride 0.9 % 500 mL irrigation, 50,000 Units, Topical, Once, Megan Churchill MD    clindamycin (CLEOCIN) 600 mg in dextrose 5 % 50 mL IVPB, 600 mg, Intravenous, 60 Min Pre-Op, Megan Churchill MD    melatonin tablet 3 mg, 3 mg, Oral, Nightly PRN, Tanja Mcintosh PA-C, 3 mg at 02/02/20 2114    insulin lispro (HUMALOG) injection vial 0-40 Units, 0-40 Units, Subcutaneous, TID WC, Oziel Anderson MD, 15 Units at 02/03/20 1615    losartan (COZAAR) tablet 25 mg, 25 mg, Oral, Daily, Sharif Tobin MD, 25 mg at 02/03/20 1143    insulin glargine (LANTUS) injection vial 36 Units, 36 Units, Subcutaneous, BID, Zulema Pathak MD, 36 Units at 02/03/20 2110    insulin lispro (HUMALOG) injection vial 0-6 Units, 0-6 Units, Subcutaneous, TID WC, Zulema Pathak MD, 2 Units at 02/03/20 1615    insulin lispro (HUMALOG) injection vial 0-3 Units, 0-3 Units, Subcutaneous, Nightly, Zulema Pathak MD, 2 Units at 02/01/20 2230    glucose (GLUTOSE) 40 % oral gel 15 g, 15 g, Oral, PRN, Zulema Pathak MD    dextrose 50 % IV solution, 12.5 g, Intravenous, PRN, Zulema Pathak MD    glucagon (rDNA) injection 1 mg, 1 mg, Intramuscular, PRN, Zulema Pathak MD    dextrose 5 % solution, 100 mL/hr, Intravenous, PRN, Alia Saul Dustin Garcia MD    acetaminophen (TYLENOL) tablet 650 mg, 650 mg, Oral, Q4H PRN, Uvaldo Gordon MD, 650 mg at 01/30/20 2054    magnesium hydroxide (MILK OF MAGNESIA) 400 MG/5ML suspension 30 mL, 30 mL, Oral, Daily PRN, Uvaldo Gordon MD    ondansetron [facility]) injection 4 mg, 4 mg, Intravenous, Q6H PRN, Uvaldo Gordon MD    aspirin tablet 325 mg, 325 mg, Oral, Daily, Maria Dolores Torres MD, 325 mg at 02/03/20 1143    sodium chloride flush 0.9 % injection 10 mL, 10 mL, Intravenous, 2 times per day, Uvaldo Gordon MD, 10 mL at 02/03/20 2110    sodium chloride flush 0.9 % injection 10 mL, 10 mL, Intravenous, PRN, Uvaldo Gordon MD    nitroGLYCERIN (NITROSTAT) SL tablet 0.4 mg, 0.4 mg, Sublingual, Q5 Min PRN, Uvaldo Gordon MD    Vitamin D (CHOLECALCIFEROL) tablet 5,000 Units, 5,000 Units, Oral, Daily, Uvaldo Gordon MD, 5,000 Units at 01/31/20 1118    atenolol (TENORMIN) tablet 50 mg, 50 mg, Oral, Daily, Maria Dolores Sage MD, 50 mg at 02/03/20 1143    atorvastatin (LIPITOR) tablet 80 mg, 80 mg, Oral, Daily, Uvaldo Gordon MD, 80 mg at 02/03/20 2108    clopidogrel (PLAVIX) tablet 75 mg, 75 mg, Oral, Daily, Uvaldo Gordon MD, 75 mg at 02/03/20 1143    isosorbide mononitrate (IMDUR) extended release tablet 60 mg, 60 mg, Oral, Daily, Uvaldo Gordon MD, 60 mg at 02/03/20 1143    levothyroxine (SYNTHROID) tablet 75 mcg, 75 mcg, Oral, Daily, Uvaldo Gordon MD, 75 mcg at 02/04/20 6093    Liraglutide (VICTOZA) SC injection 1.8 mg, 1.8 mg, Subcutaneous, Daily, Maria Dolores Torres MD, 1.8 mg at 02/03/20 0700    ranolazine (RANEXA) extended release tablet 500 mg, 500 mg, Oral, BID, Uvaldo Gordon MD, 500 mg at 02/03/20 2108    sertraline (ZOLOFT) tablet 100 mg, 100 mg, Oral, Daily, Uvaldo Gordon MD, 100 mg at 02/03/20 1143  Prior to Admission medications    Medication Sig Start Date End Date Taking?  Authorizing Provider   ranolazine (RANEXA) 500 MG extended release tablet Take 500 mg by mouth 2 times daily   Yes Historical Provider, MD   levothyroxine (SYNTHROID) 75 MCG tablet TAKE 1 TABLET DAILY 1/27/20  Yes Elizabeth Ly MD   insulin glargine (LANTUS SOLOSTAR) 100 UNIT/ML injection pen INJECT 36 UNITS UNDER THE SKIN IN THE MORNING AND 36 UNITS IN THE EVENING 1/27/20  Yes Elizabeth Ly MD   sertraline (ZOLOFT) 100 MG tablet TAKE 1 TABLET DAILY 1/13/20  Yes ZHANNA Diane CNP   Liraglutide (VICTOZA) 18 MG/3ML SOPN SC injection INJECT 1.8 MG UNDER THE SKIN DAILY 12/26/19  Yes Elizabeth Ly MD   atorvastatin (LIPITOR) 80 MG tablet TAKE 1 TABLET DAILY 12/4/19  Yes Damaris Dickinson MD   atenolol (TENORMIN) 50 MG tablet TAKE 1 TABLET DAILY 8/23/19  Yes Elizabeth Ly MD   insulin lispro (HUMALOG KWIKPEN) 100 UNIT/ML pen INJECT PER SLIDING SCALE THREE TIMES A DAY BEFORE MEALS (MAX OF 80 UNITS DAILY) 8/15/19  Yes ZHANNA Diane CNP   isosorbide mononitrate (IMDUR) 30 MG extended release tablet  5/7/19  Yes Historical Provider, MD   FLOVENT  MCG/ACT inhaler Inhale 2 puffs into the lungs as needed  5/4/19  Yes Historical Provider, MD   clopidogrel (PLAVIX) 75 MG tablet Take 1 tablet by mouth daily 4/20/19  Yes Tea Barbosa MD   losartan (COZAAR) 50 MG tablet Take 1 tablet by mouth daily 1/18/19  Yes Elizabeth Ly MD   CPAP Machine [facility] by Does not apply route Please change APAP pressure to 12 to 20 cm H20. 7/17/18  Yes Maddi Fan MD   potassium chloride (KLOR-CON M) 10 MEQ extended release tablet Take 1 tablet by mouth 2 times daily 5/27/18  Yes Tea Barbosa MD   aspirin 81 MG EC tablet Take 81 mg by mouth daily   Yes Historical Provider, MD   bumetanide (BUMEX) 2 MG tablet Take 1 tablet by mouth 2 times daily  5/1/17  Yes Lisa Kenny MD   albuterol sulfate HFA (PROVENTIL HFA) 108 (90 BASE) MCG/ACT inhaler Inhale 2 puffs into the lungs every 6 hours as needed for Wheezing 8/22/16  Yes Fany Mckeon PA-C   glucose blood VI test strips (ACCU-CHEK TERESA PLUS) strip Check BS 4 times a day Airway Classification:   []1 [x]2 []3 []4    [x]Pre-procedure diagnostic studies complete and results available. Comment:    [x]Previous sedation/anesthesia experiences assessed. Comment:    [x]The patient is an appropriate candidate to undergo the planned procedure sedation and anesthesia. (Refer to nursing sedation/analgesia documentation record)  [x]Formulation and discussion of sedation/procedure plan, risks, and expectations with patient and/or responsible adult completed. [x]Patient examined immediately prior to the procedure.  (Refer to nursing sedation/analgesia documentation record)    Fernanda Cardenas MD  Electronically signed 2/4/2020 at 9:14 AM

## 2020-02-04 NOTE — PROGRESS NOTES
CLINICAL PHARMACY: DISCHARGE MED RECONCILIATION/REVIEW    [facility] ([facility]) Select Patient?: Yes  Total # of Interventions Recommended: 0   -   Total # Interventions Accepted: 0  Intervention Severity:   - Level 1 Intervention Present?: No   - Level 2 #: 0   - Level 3 #: 0   Time Spent (min): Eleanor Desai PharmD, BCPS  2/4/2020  11:23 AM

## 2020-02-05 NOTE — TELEPHONE ENCOUNTER
CLINICAL PHARMACY CONSULTATION: Transition of Care (ADRIANO)  -----------------------------------------------------------------------------------------------  Johana Baumgarten is a 75 y.o. male  who was discharged from [address] [place] [address] on 2/4/2020. Attempt made to contact pt. Left msg on home TAD requesting call back at [phone] or [phone] option 7. Called mobile number listed and spoke with female who states Cecelia De is still sleeping. She states that can try calling back later. Will continue to attempt to contact pt as appropriate.     Nabil Rivero, PharmD, [address] [facility] Clinical Pharmacist  O: [phone]  Toll free: [phone], option 7    TCM Call Made?: Yes

## 2020-02-05 NOTE — CARE COORDINATION
[facility] Transitions Initial Follow Up Call    Call within 2 business days of discharge: Yes    Patient: Jose Francis Patient : 1945   MRN: [id number]  Reason for Admission: cardiac cath  Discharge Date: 20 RARS: Readmission Risk Score: 22      Last Discharge [facility]       Complaint Diagnosis Description Type Department Provider    20   Admission (Discharged) Iwona Forrester MD           Spoke with: [address]: [facility]    Non-face-to-face services provided:  Obtained and reviewed discharge summary and/or continuity of care documents  Assessment and support for treatment adherence and medication management-reviewed meds    Care Transitions 24 Hour Call    Do you have any ongoing symptoms?:  No  Do you have a copy of your discharge instructions?:  Yes  Do you have all of your prescriptions and are they filled?:  Yes  Have you scheduled your follow up appointment?:  Yes  How are you going to get to your appointment?:  Car - family or friend to transport  Were you discharged with any Home Care or Post Acute Services:  No  Patient Home Equipment:  CPAP, Other (Comment: [employer])  Do you have support at home?:  Partner/Spouse/SO  Do you feel like you have everything you need to keep you well at home?:  Yes  Are you an active caregiver in your home?:  Yes  Care Transitions Interventions  No Identified Needs                             Called pt for the care transition initial follow up call, explained the role of the CTN. Pt was admitted on    for a cardiac cath with stent placed. Pt denied any pain or swelling in the right groin site. Post Procedure Diagnosis/Findings:  Coronary Artery Disease   Pt denied chest pain, dizziness or palpitations. Pt stated he nay need another stent in 8 wks. Renal function was worse & pt had  Dialysis cath placed. Pt reported the dressing is dry & intact. Pt goes to dialysis at [facility] on T Th Sat.   Pt reported he still makes

## 2020-02-07 NOTE — TELEPHONE ENCOUNTER
[facility] Transitions Initial Follow Up Call    Call within 2 business days of discharge: Yes     Patient: Chrissy Jovel Patient : [birth year] MRN: [id number]    [unfilled]    RARS: Readmission Risk Score: 22       Spoke with: Patient    Discharge department/facility: [facility]    Non-face-to-face services provided:  Scheduled appointment with PCP-02/10/2020    Follow Up  Future Appointments   Date Time Provider Dean Simpson   2/10/2020 10:20 AM Shady Mccann MD SRPX [facility] - [address] [facility]   2020 11:00 AM Nicki Holder   7/10/2020  9:45 AM [facility] P - [address] [place] [address] Center

## 2020-02-10 NOTE — PROGRESS NOTES
tablet TAKE 1 TABLET DAILY 90 tablet 4    insulin lispro (HUMALOG KWIKPEN) 100 UNIT/ML pen INJECT PER SLIDING SCALE THREE TIMES A DAY BEFORE MEALS (MAX OF 80 UNITS DAILY) 60 mL 4    FLOVENT  MCG/ACT inhaler Inhale 2 puffs into the lungs as needed       Cholecalciferol (VITAMIN D3) 5000 units TABS Take 5,000 Units by mouth daily      clopidogrel (PLAVIX) 75 MG tablet Take 1 tablet by mouth daily 30 tablet 3    losartan (COZAAR) 50 MG tablet Take 1 tablet by mouth daily 90 tablet 1    CPAP Machine MISC by Does not apply route Please change APAP pressure to 12 to 20 cm H20. 1 each 0    nitroGLYCERIN (NITROLINGUAL) 0.4 MG/SPRAY 0.4 mg spray Place 1 spray under the tongue as needed  0    aspirin 81 MG EC tablet Take 81 mg by mouth daily      albuterol sulfate HFA (PROVENTIL HFA) 108 (90 BASE) MCG/ACT inhaler Inhale 2 puffs into the lungs every 6 hours as needed for Wheezing 1 Inhaler 3    glucose blood VI test strips (ACCU-CHEK TERESA PLUS) strip Check BS 4 times a day 400 each 1    B-D ULTRAFINE III SHORT PEN 31G X 8 MM MISC USE TO INJECT INSULIN UNDER THE SKIN FOUR TIMES A  each 1    glucagon 1 MG injection Inject 1 mg into the skin See Admin Instructions. Follow package directions for low blood sugar. 1 kit 3    Multiple Vitamin (MULTI-VITAMIN) TABS   Take 1 tablet by mouth daily            Vitals:    02/10/20 1018   BP: 137/85   Site: Left Upper Arm   Position: Sitting   Cuff Size: Large Adult   Pulse: 80   Resp: 16   Temp: 97.8 °F (36.6 °C)   TempSrc: Oral   Weight: 277 lb 12.8 oz (126 kg)   Height: 6' (1.829 m)     Body mass index is 37.68 kg/m².      Wt Readings from Last 3 Encounters:   02/10/20 277 lb 12.8 oz (126 kg)   02/04/20 262 lb 1.6 oz (118.9 kg)   10/22/19 267 lb (121.1 kg)     BP Readings from Last 3 Encounters:   02/10/20 137/85   02/04/20 130/72   10/22/19 128/67        Patient was admitted to [facility]  from 1/29/2020 to 2/4/2020 for renal insufficiency and cardiac stent and

## 2020-02-12 NOTE — CARE COORDINATION
[facility] Transitions Follow Up Call    2020    Patient: Vy Bello  Patient : 1945   MRN: [id number]  Reason for Admission:Chronic renal insufficiency [N18.9]   Cardiac cath  Discharge Date: 20 RARS: Readmission Risk Score: 25    Spoke with: Gil Geusing    Care Transitions Subsequent and Final Call    Subsequent and Final Calls  Do you have any ongoing symptoms?:  No  Have your medications changed?:  No  Do you have any questions related to your medications?:  No  Do you currently have any active services?:  No  Do you have any needs or concerns that I can assist you with?:  No  Identified Barriers:  None  Care Transitions Interventions  No Identified Needs                          Other Interventions:        Called pt for sub transition call. Pt stated he has appt on  with Dr Dandre Holly. Pt may have another stent in a few weeks. Pt denied any chest pain, dizziness or palpitations. Pt has been feeling good. Pt stated he makes urine several times a day & feels empty after voiding. Pt has dialysis at [facility]. Pt denied any low or high sugars in the past week. FBS today 74. Pt denied any needs or concerns. CTC will continue to follow.     Follow Up  Future Appointments   Date Time Provider Dean Simpson   2020 11:00 AM Nicki Hoff   6/10/2020  9:40 AM MD BRANT [facility] - [address] [facility]   7/10/2020  9:45 AM ZHANNA Martinez - CNP [address] [facility] Urology P - Deborah Overton RN  Care Transition Nurse  [phone]

## 2020-02-27 NOTE — CARE COORDINATION
[facility] Transitions Follow Up Call    2020    Patient: Keyon Matute  Patient :    MRN: [id number]  Reason for Admission: Chronic renal insufficiency [N18.9]   Cardiac cath  Discharge Date: 20 RARS: Readmission Risk Score: 22         Attempted sub transition message.  Left message to return call to [phone]    Follow Up  Future Appointments   Date Time Provider Dean Simpson   3/25/2020  5:00 AM STR CVI ROOM 2E16 STRZ 2E [place] [address] [address] [facility]   3/25/2020  7:00 AM STR CARDIAC CATH RM3 STRZ CATH [place] [facility]   2020 11:00 AM Nicki Lake   6/10/2020  9:40 AM Boston Salgado MD SRPX [facility] - [place] [address] [address]   7/10/2020  9:45 AM [facility] P - Jennifer Serna RN

## 2020-02-28 NOTE — CARE COORDINATION
[facility] Transitions Follow Up Call    2020    Patient: Harshad Blake  Patient : 1945   MRN: [id number]  Reason for Admission: Chronic renal insufficiency [N18.9]   Cardiac cath  Discharge Date: 20 RARS: Readmission Risk Score: 25    Spoke with: Jose Ramon Major    Care Transitions Subsequent and Final Call    Subsequent and Final Calls  Do you have any ongoing symptoms?:  Yes  Patient-reported symptoms:  Shortness of Breath  Have your medications changed?:  No  Do you have any questions related to your medications?:  No  Do you currently have any active services?:  No  Do you have any needs or concerns that I can assist you with?:  No  Identified Barriers:  None  Care Transitions Interventions  No Identified Needs                          Other Interventions:        Called pt for the sub transition call. Pt stated his SOB is \"bad\"  Pt stated he has SOB with exertion & sometimes with rest. Pt stated he is to have another stent placed on 3/25/20. Pt stated \"they took off 9#\" of water in dialysis. Pt stated he has a fluid restriction & having difficulty following the quantity due to his thirst, which is even worse after dialysis. Suggested pt use sugar free lozenges or ice. Pt will have the port for dialysis placed on 3/11/20 @ [facility]. FBS 92, pt stated it has been running \"pretty good\"    Pt denied any needs or concerns. CTC will continue to follow.     Follow Up  Future Appointments   Date Time Provider Dean Simpson   3/25/2020  5:00 AM STR CVI ROOM 2E16 STRZ 2E [address] [facility] HOD   3/25/2020  7:00 AM STR CARDIAC CATH RM3 STRZ CATH LB [address] [facility] HOD   2020 11:00 AM Nicki Palomares 354   6/10/2020  9:40 AM MD BRANT [place] [facility] - [address] [facility]   7/10/2020  9:45 AM ZHANNA Farias - CNP [address] [facility] Urology [facility] - Ector Lantigua RN  Care Transition Nurse  [phone]

## 2020-03-09 NOTE — CARE COORDINATION
[facility] Transitions Follow Up Call    3/9/2020    Patient: Josh Mendoza  Patient : [birth year]   MRN: [id number]  Reason for Admission: Chronic renal insufficiency [N18.9]   Cardiac cath  Discharge Date: 20 RARS: Readmission Risk Score: 22         Spoke with: Josh Mendoza  Patient stated he is doing better. He just got home from dialysis. They removed 3L of fluid. Patient states chronic short of breath. Educated patient to use pursed-lip breathing and sit forward with back support. Sleep in relax position with head slightly raised. Contact PCP if breathing becomes to difficult. Denies abnormal SOB. His has not been able to get fistula in his arm because he needs another stent. He is not able to stop asprin and plavix. BS was 115 this am. Denies concerns or issues at this time. Patient understands this is his last call. Care Transitions Subsequent and Final Call    Subsequent and Final Calls  Do you have any ongoing symptoms?:  No  Have your medications changed?:  No  Do you have any questions related to your medications?:  No  Do you currently have any active services?:  No  Do you have any needs or concerns that I can assist you with?:  No  Identified Barriers:  None  Care Transitions Interventions  No Identified Needs  Other Interventions:             Follow Up  Future Appointments   Date Time Provider Dean Simpson   3/25/2020  5:00 AM STR CVI ROOM 2E16 [facility] [place] [facility]   3/25/2020  7:00 AM STR CARDIAC CATH RM3 STRZ CATH LB [place] [facility]   2020 11:00 AM Nicki Evangelista   6/10/2020  9:40 AM Allison Melton MD SRPX [facility] - [place] [address]   7/10/2020  9:45 AM ZHANNA Szymanski - CNP [place] [facility] P - Lalit Munoz RN

## 2020-04-29 NOTE — TELEPHONE ENCOUNTER
Jessica Dominguez was contacted to set up a video visit with Noel Marcano MD.     Spoke with: Patient    Patient encouraged to sign up for MyChart in order to complete Virtual Visits, if MyChart status was not already active. Patient agreeableto sign up for a Virtual Visit with PCP within the next week.      [address]

## 2020-05-05 NOTE — PROGRESS NOTES
[] Abnormal-       Neurological:        [x] No Facial Asymmetry (Cranial nerve 7 motor function) (limited exam to video visit)          [] No gaze palsy        [] Abnormal-         Skin:        [x] No significant exanthematous lesions or discoloration noted on facial skin         [] Abnormal-            Psychiatric:       [x] Normal Affect [] No Hallucinations        [] Abnormal-     Other pertinent observable physical exam findings-  2+ edema bilaterally    ASSESSMENT/PLAN:    1. Acquired hypothyroidism  Continue Synthroid 88 ug PO daily  Check TSH last week of May  Change the time you take the MVI. Do not take it with the Synthroid    2. Uncontrolled type 2 diabetes mellitus with hyperglycemia (HCC)  Continue Lantus 36 U SQ BID    3. ESRD (end stage renal disease) ([facility] [address]  Continue hemodialysis as per Renal       Return in about 4 months (around 9/5/2020). Uriel Acevedo is a 75 y.o. male being evaluated by a Virtual Visit (video visit) encounter to address concerns as mentioned above. A caregiver was present when appropriate. Due to this being a TeleHealth encounter (During [place] public [facility] emergency), evaluation of the following organ systems was limited: Vitals/Constitutional/EENT/Resp/CV/GI//MS/Neuro/Skin/Heme-Lymph-Imm. Pursuant to the emergency declaration under the [address] [place] [address], [address] [place] [address] authority and the [employer] Act, this Virtual Visit was conducted with patient's (and/or legal guardian's) consent, to reduce the patient's risk of exposure to COVID-19 and provide necessary medical care. The patient (and/or legal guardian) has also been advised to contact this office for worsening conditions or problems, and seek emergency medical treatment and/or call 911 if deemed necessary.      Patient identification was verified at the start of the visit: Yes    Total time spent on this encounter: Not billed by time    Services were provided through a video synchronous discussion virtually to substitute for in-person clinic visit. Patient and provider were located at their individual homes. --Hay Markham MD on 5/5/2020 at 2:54 PM    An electronic signature was used to authenticate this note.

## 2023-09-25 NOTE — PLAN OF CARE
Problem: Falls - Risk of:  Goal: Will remain free from falls  Description  Will remain free from falls  Outcome: Ongoing  Note:   Patient at risk for falls due to iv therapy. Fall assessment completed. Patient uses call light for needs this shift. Fall band in place on patient's arm. Fall signs posted. Bed alarm in place and functioning properly. Problem: Pain:  Goal: Pain level will decrease  Description  Pain level will decrease  Outcome: Ongoing  Note:   Patient at risk for increase in pain level d/t chronic back pain. Patient's pain goal is 0/10. Current interventions in place to manage acute and/or chronic pain level include pain medication. Patient accepting pain interventions without difficulty. Problem: DISCHARGE BARRIERS  Goal: Patient's continuum of care needs are met  Outcome: Ongoing  Note:   Discharge plan reviewed with patient. Patient continues to actively participate in current discharge plan. Case management/ following patient. Problem: Cardiac Output - Decreased:  Goal: Hemodynamic stability will improve  Description  Hemodynamic stability will improve  Outcome: Ongoing  Note:   Vitals:    02/03/20 2100   BP: 132/80   Pulse: 77   Resp: 18   Temp: 97.8 °F (36.6 °C)   SpO2: 97%              Problem: Tissue Perfusion - Cardiopulmonary, Altered:  Goal: Absence of angina  Description  Absence of angina  Outcome: Ongoing  Note:   Voices no complaints of chest pain. Problem: Fluid Volume:  Goal: Will show no signs or symptoms of fluid imbalance  Description  Will show no signs or symptoms of fluid imbalance  Outcome: Ongoing  Note:   Patient at risk for imbalanced fluid volume this shift d/t hemodialysis. Continuing to monitor intakes/outputs this shift and reporting abnormalities to physician. Care plan reviewed with patient. Patient verbalized understanding of the care plan and contribute to goal setting. Lab: -[id number] Lab: -[id number]